# Patient Record
Sex: FEMALE | Race: WHITE | NOT HISPANIC OR LATINO | Employment: OTHER | URBAN - METROPOLITAN AREA
[De-identification: names, ages, dates, MRNs, and addresses within clinical notes are randomized per-mention and may not be internally consistent; named-entity substitution may affect disease eponyms.]

---

## 2017-04-02 ENCOUNTER — OFFICE VISIT (OUTPATIENT)
Dept: URGENT CARE | Facility: URGENT CARE | Age: 47
End: 2017-04-02
Payer: MEDICARE

## 2017-04-02 VITALS
TEMPERATURE: 99.2 F | WEIGHT: 221.25 LBS | OXYGEN SATURATION: 97 % | HEART RATE: 80 BPM | BODY MASS INDEX: 35.71 KG/M2 | SYSTOLIC BLOOD PRESSURE: 99 MMHG | DIASTOLIC BLOOD PRESSURE: 70 MMHG

## 2017-04-02 DIAGNOSIS — R05.9 COUGH: ICD-10-CM

## 2017-04-02 DIAGNOSIS — R07.0 THROAT PAIN: Primary | ICD-10-CM

## 2017-04-02 LAB
DEPRECATED S PYO AG THROAT QL EIA: NORMAL
MICRO REPORT STATUS: NORMAL
SPECIMEN SOURCE: NORMAL

## 2017-04-02 PROCEDURE — 87880 STREP A ASSAY W/OPTIC: CPT | Performed by: PHYSICIAN ASSISTANT

## 2017-04-02 PROCEDURE — 87081 CULTURE SCREEN ONLY: CPT | Performed by: PHYSICIAN ASSISTANT

## 2017-04-02 PROCEDURE — 99203 OFFICE O/P NEW LOW 30 MIN: CPT | Performed by: PHYSICIAN ASSISTANT

## 2017-04-02 RX ORDER — CODEINE PHOSPHATE AND GUAIFENESIN 10; 100 MG/5ML; MG/5ML
1 SOLUTION ORAL EVERY 4 HOURS PRN
Qty: 120 ML | Refills: 0 | Status: SHIPPED | OUTPATIENT
Start: 2017-04-02 | End: 2019-11-19

## 2017-04-02 NOTE — NURSING NOTE
"Chief Complaint   Patient presents with     Throat Pain     throat pain and cough for three days.       Initial BP 99/70  Pulse 80  Temp 99.2  F (37.3  C) (Oral)  Wt 221 lb 4 oz (100.4 kg)  SpO2 97%  BMI 35.71 kg/m2 Estimated body mass index is 35.71 kg/(m^2) as calculated from the following:    Height as of 2/28/14: 5' 6\" (1.676 m).    Weight as of this encounter: 221 lb 4 oz (100.4 kg).  Medication Reconciliation: complete    "

## 2017-04-02 NOTE — MR AVS SNAPSHOT
"              After Visit Summary   2017    Nora Zamorano    MRN: [id number]           Patient Information     Date Of Birth          1970        Visit Information        Provider Department      2017 9:30 AM Katy Alanis PA-C [place] [facility]        Today's Diagnoses     Throat pain    -  1       Follow-ups after your visit        Who to contact     If you have questions or need follow up information about today's clinic visit or your schedule please contact [place] [facility] directly at [phone].  Normal or non-critical lab and imaging results will be communicated to you by Songforhart, letter or phone within 4 business days after the clinic has received the results. If you do not hear from us within 7 days, please contact the clinic through Songforhart or phone. If you have a critical or abnormal lab result, we will notify you by phone as soon as possible.  Submit refill requests through Speek or call your pharmacy and they will forward the refill request to us. Please allow 3 business days for your refill to be completed.          Additional Information About Your Visit        MyChart Information     Speek lets you send messages to your doctor, view your test results, renew your prescriptions, schedule appointments and more. To sign up, go to [email or web address] . Click on \"Log in\" on the left side of the screen, which will take you to the Welcome page. Then click on \"Sign up Now\" on the right side of the page.     You will be asked to enter the access code listed below, as well as some personal information. Please follow the directions to create your username and password.     Your access code is: [email or web address]  Expires: 2017 10:19 AM     Your access code will  in 90 days. If you need help or a new code, please call your [place] clinic or [phone].        Care EveryWhere ID     This is your Care EveryWhere ID. This could " be used by other organizations to access your [place] medical records  [id number]        Your Vitals Were     Pulse Temperature Pulse Oximetry BMI (Body Mass Index)          80 99.2  F (37.3  C) (Oral) 97% 35.71 kg/m2         Blood Pressure from Last 3 Encounters:   04/02/17 99/70   02/28/14 109/81   02/04/13 122/80    Weight from Last 3 Encounters:   04/02/17 221 lb 4 oz (100.4 kg)   02/28/14 160 lb (72.6 kg)   02/04/13 244 lb (110.7 kg)              We Performed the Following     Beta strep group A culture     Rapid strep screen        Primary Care Provider Office Phone # Fax #    Jesse Day [phone]       [address] [address]        Thank you!     Thank you for choosing [place] [facility]  for your care. Our goal is always to provide you with excellent care. Hearing back from our patients is one way we can continue to improve our services. Please take a few minutes to complete the written survey that you may receive in the mail after your visit with us. Thank you!             Your Updated Medication List - Protect others around you: Learn how to safely use, store and throw away your medicines at www.disposemymeds.org.          This list is accurate as of: 4/2/17 10:19 AM.  Always use your most recent med list.                   Brand Name Dispense Instructions for use    calcium carbonate 500 MG tablet    OS- mg [place]. Ca    100 tablet    Take 2 tablets by mouth every 12 hours. 2 tablets twice daily x 2 weeks then 1 tablet twice daily until gone.       escitalopram 20 MG tablet    LEXAPRO    30 tablet    Take 1 tablet by mouth daily.       HYDROcodone-acetaminophen 7.5-325 MG per tablet    NORCO    18 tablet    Take 1 tablet by mouth every 3 hours as needed for pain.       levothyroxine 100 MCG tablet    SYNTHROID/LEVOTHROID     Take 350 mcg by mouth daily.       LOW-OGESTREL 0.3-30 MG-MCG per tablet   Generic  drug:  norgestrel-ethinyl estradiol      Take 1 tablet by mouth daily.       mupirocin 2 % ointment    BACTROBAN    22 g    Apply to skin lesion on nose bridge and nasal mucous membrane 2-3 times daily for 7-10 days       potassium chloride 20 MEQ Packet    KLOR-CON    60 tablet    Take 20 mEq by mouth 2 times daily       SEROQUEL 300 MG tablet   Generic drug:  QUEtiapine      Take 300 mg by mouth 2 times daily.       vilazodone 40 MG Tabs tablet    VIIBRYD    30 tablet    Take 1 tablet by mouth daily.

## 2017-04-02 NOTE — PROGRESS NOTES
SUBJECTIVE:   Nora Zamorano is a 47 year old female presenting with a chief complaint of   1) sore throat for 3 days  2) low grade fever  3) cough, dry.  Onset of symptoms was 3 day(s) ago.  Course of illness is worsening.    Severity moderate  Current and Associated symptoms: as above  Treatment measures tried include OTC meds.  Predisposing factors include none.  Did have a flu vaccination this season.    Past Medical History:   Diagnosis Date     Blood transfusion      Depressive disorder      Malignant tumor of thyroid gland (H)     10/10 2011 THYYROIDECTOMY     Patient Active Problem List   Diagnosis     Thyroid cancer--TSH target 0.05 to 0.4     Health Care Home (Not Active)     CARDIOVASCULAR SCREENING; LDL GOAL LESS THAN 130     History of MRSA infection     Obesity     Major depressive disorder, single episode, severe (H)     Social History   Substance Use Topics     Smoking status: Never Smoker     Smokeless tobacco: Never Used     Alcohol use No       ROS:  CONSTITUTIONAL:as per HPI  INTEGUMENTARY/SKIN: NEGATIVE for worrisome rashes, moles or lesions  EYES: NEGATIVE for vision changes or irritation  ENT/MOUTH: as per HPI  RESP:as per HPI  CV: NEGATIVE for chest pain, palpitations or peripheral edema  MUSCULOSKELETAL: NEGATIVE for significant arthralgias or myalgia    OBJECTIVE  :BP 99/70  Pulse 80  Temp 99.2  F (37.3  C) (Oral)  Wt 221 lb 4 oz (100.4 kg)  SpO2 97%  BMI 35.71 kg/m2  GENERAL APPEARANCE: healthy, alert and no distress  EYES: EOMI,  PERRL, conjunctiva clear  HENT: ear canals and TM's normal.  Nose and mouth without ulcers, erythema or lesions  HENT: nasal turbinates boggy with bluish hue and rhinorrhea clear  NECK: supple, nontender, no lymphadenopathy  RESP: lungs clear to auscultation - no rales, rhonchi or wheezes  CV: regular rates and rhythm, normal S1 S2, no murmur noted  ABDOMEN:  soft, nontender, no HSM or masses and bowel sounds normal  SKIN: no suspicious lesions or  rashes    (R07.0) Throat pain  (primary encounter diagnosis)  Comment: secondary to post nasal drip  Plan: Rapid strep screen, Beta strep group A culture        Salt water gargles.  Tylenol prn.   May try benadryl po QHS.  Cutlure pending.      (R05) Cough  Comment:   Plan: guaiFENesin-codeine (ROBITUSSIN AC) 100-10         MG/5ML SOLN solution            F/U with PCP should symptoms persist or worsen.  Patient expresses understanding and agreement with the assessment and plan as above.

## 2017-04-04 LAB
BACTERIA SPEC CULT: NORMAL
MICRO REPORT STATUS: NORMAL
SPECIMEN SOURCE: NORMAL

## 2018-10-20 ENCOUNTER — OFFICE VISIT (OUTPATIENT)
Dept: URGENT CARE | Facility: URGENT CARE | Age: 48
End: 2018-10-20
Payer: MEDICARE

## 2018-10-20 ENCOUNTER — RADIANT APPOINTMENT (OUTPATIENT)
Dept: GENERAL RADIOLOGY | Facility: CLINIC | Age: 48
End: 2018-10-20
Attending: FAMILY MEDICINE
Payer: MEDICARE

## 2018-10-20 VITALS
HEART RATE: 89 BPM | WEIGHT: 198.9 LBS | SYSTOLIC BLOOD PRESSURE: 109 MMHG | BODY MASS INDEX: 32.1 KG/M2 | TEMPERATURE: 98.5 F | OXYGEN SATURATION: 96 % | DIASTOLIC BLOOD PRESSURE: 72 MMHG | RESPIRATION RATE: 12 BRPM

## 2018-10-20 DIAGNOSIS — M20.012 MALLET FINGER, LEFT: Primary | ICD-10-CM

## 2018-10-20 DIAGNOSIS — M79.645 PAIN OF FINGER OF LEFT HAND: ICD-10-CM

## 2018-10-20 PROCEDURE — 99213 OFFICE O/P EST LOW 20 MIN: CPT | Performed by: FAMILY MEDICINE

## 2018-10-20 PROCEDURE — 73140 X-RAY EXAM OF FINGER(S): CPT | Mod: LT

## 2018-10-20 RX ORDER — TEMAZEPAM 15 MG/1
15 CAPSULE ORAL
COMMUNITY
Start: 2018-10-10

## 2018-10-20 RX ORDER — PROPRANOLOL HYDROCHLORIDE 20 MG/1
TABLET ORAL
COMMUNITY
Start: 2018-08-21 | End: 2022-01-28

## 2018-10-20 NOTE — MR AVS SNAPSHOT
After Visit Summary   10/20/2018    Nora Zamorano    MRN: [id number]           Patient Information     Date Of Birth          1970        Visit Information        Provider Department      10/20/2018 11:20 AM Timbo Mckeon MD [facility]        Today's Diagnoses     Mallet finger, left    -  1       Follow-ups after your visit        Additional Services     ORTHO  REFERRAL       [facility] is referring you to the Orthopedic  Services at [facility].       The  Representative will assist you in the coordination of your Orthopedic and Musculoskeletal Care as prescribed by your physician.    The  Representative will call you within 1 business day to help schedule your appointment, or you may contact the  Representative at:    All areas ~ [phone]     Type of Referral : Surgical / Specialist       Timeframe requested: 1 - 2 days    Coverage of these services is subject to the terms and limitations of your health insurance plan.  Please call member services at your health plan with any benefit or coverage questions.      If X-rays, CT or MRI's have been performed, please contact the facility where they were done to arrange for , prior to your scheduled appointment.  Please bring this referral request to your appointment and present it to your specialist.                  Who to contact     If you have questions or need follow up information about today's clinic visit or your schedule please contact [place] [facility] directly at [phone].  Normal or non-critical lab and imaging results will be communicated to you by MyChart, letter or phone within 4 business days after the clinic has received the results. If you do not hear from us within 7 days, please contact the clinic through MyChart or phone. If you have a critical or abnormal lab result, we  "will notify you by phone as soon as possible.  Submit refill requests through [employer] or call your pharmacy and they will forward the refill request to us. Please allow 3 business days for your refill to be completed.          Additional Information About Your Visit        Curriculethart Information     [employer] lets you send messages to your doctor, view your test results, renew your prescriptions, schedule appointments and more. To sign up, go to [email or web address] . Click on \"Log in\" on the left side of the screen, which will take you to the Welcome page. Then click on \"Sign up Now\" on the right side of the page.     You will be asked to enter the access code listed below, as well as some personal information. Please follow the directions to create your username and password.     Your access code is: [email or web address]  Expires: 2019 12:16 PM     Your access code will  in 90 days. If you need help or a new code, please call your [place] clinic or [phone].        Care EveryWhere ID     This is your Care EveryWhere ID. This could be used by other organizations to access your [place] medical records  [id number]        Your Vitals Were     Pulse Temperature Respirations Pulse Oximetry BMI (Body Mass Index)       89 98.5  F (36.9  C) (Oral) 12 96% 32.1 kg/m2        Blood Pressure from Last 3 Encounters:   10/20/18 109/72   17 99/70   14 109/81    Weight from Last 3 Encounters:   10/20/18 198 lb 14.4 oz (90.2 kg)   17 221 lb 4 oz (100.4 kg)   14 160 lb (72.6 kg)              We Performed the Following     ORTHO  REFERRAL        Primary Care Provider Office Phone # Fax #    Rozina Eugene [phone]       [facility] [address] [facility] [address]        Equal Access to Services     DIANELYS MICHAELS AH: Clif Darden, priyanka carlisle, brett loran ah. So [facility] " [phone].    ATENCIÓN: Si isamar mazariegos, tiene a richmond disposición servicios gratuitos de asistencia lingüística. Melinda bauer [phone].    We comply with applicable federal civil rights laws and [place] laws. We do not discriminate on the basis of race, color, national origin, age, disability, sex, sexual orientation, or gender identity.            Thank you!     Thank you for choosing [place] [facility]  for your care. Our goal is always to provide you with excellent care. Hearing back from our patients is one way we can continue to improve our services. Please take a few minutes to complete the written survey that you may receive in the mail after your visit with us. Thank you!             Your Updated Medication List - Protect others around you: Learn how to safely use, store and throw away your medicines at www.disposemymeds.org.          This list is accurate as of 10/20/18 12:16 PM.  Always use your most recent med list.                   Brand Name Dispense Instructions for use Diagnosis    calcium carbonate 500 mg (elemental) 500 MG tablet    OS-FLY    100 tablet    Take 2 tablets by mouth every 12 hours. 2 tablets twice daily x 2 weeks then 1 tablet twice daily until gone.    Thyroid cancer (H)       escitalopram 20 MG tablet    LEXAPRO    30 tablet    Take 1 tablet by mouth daily.    Depression       guaiFENesin-codeine 100-10 MG/5ML Soln solution    ROBITUSSIN AC    120 mL    Take 5 mLs by mouth every 4 hours as needed for cough    Cough       HYDROcodone-acetaminophen 7.5-325 MG per tablet    NORCO    18 tablet    Take 1 tablet by mouth every 3 hours as needed for pain.    Pain, dental       levothyroxine 100 MCG tablet    SYNTHROID/LEVOTHROID     Take 350 mcg by mouth daily.        LOW-OGESTREL 0.3-30 MG-MCG per tablet   Generic drug:  norgestrel-ethinyl estradiol      Take 1 tablet by mouth daily.        mupirocin 2 % ointment    BACTROBAN    22 g    Apply to skin lesion on  nose bridge and nasal mucous membrane 2-3 times daily for 7-10 days    Skin infection       potassium chloride 20 MEQ Packet    KLOR-CON    60 tablet    Take 20 mEq by mouth 2 times daily        propranolol 20 MG tablet    INDERAL     Take 1 Tab by mouth two times a day.        SEROQUEL 300 MG tablet   Generic drug:  QUEtiapine      Take 300 mg by mouth 2 times daily.        temazepam 15 MG capsule    RESTORIL     Take 30 mg by mouth        vilazodone 40 MG Tabs tablet    VIIBRYD    30 tablet    Take 1 tablet by mouth daily.    Depression

## 2018-10-20 NOTE — PROGRESS NOTES
SUBJECTIVE:  Chief Complaint   Patient presents with     Finger     Pt states left hand middle finger pain, and cold      Urgent Care     Nora Zamorano is a 48 year old female who presents with a chief complaint of left finger  third pain and decreased range of motion.  Symptoms began 2 day(s) ago, are moderate and sudden onset  Context:  Injury:Yes: while scrubbing the carpet.  Pain exacerbated by flexion/extension Relieved by aluminum splint.   R handed    Past Medical History:   Diagnosis Date     Blood transfusion      Depressive disorder      Malignant tumor of thyroid gland (H)     10/10 2011 THYYROIDECTOMY     Current Outpatient Prescriptions   Medication Sig Dispense Refill     escitalopram (LEXAPRO) 20 MG tablet Take 1 tablet by mouth daily. 30 tablet 0     levothyroxine (SYNTHROID,LEVOTHROID) 100 MCG tablet Take 350 mcg by mouth daily.       potassium chloride (KLOR-CON) 20 MEQ packet Take 20 mEq by mouth 2 times daily 60 tablet 0     propranolol (INDERAL) 20 MG tablet Take 1 Tab by mouth two times a day.       temazepam (RESTORIL) 15 MG capsule Take 30 mg by mouth       calcium carbonate 500 MG tablet Take 2 tablets by mouth every 12 hours. 2 tablets twice daily x 2 weeks then 1 tablet twice daily until gone. (Patient not taking: Reported on 10/20/2018) 100 tablet 0     guaiFENesin-codeine (ROBITUSSIN AC) 100-10 MG/5ML SOLN solution Take 5 mLs by mouth every 4 hours as needed for cough (Patient not taking: Reported on 10/20/2018) 120 mL 0     HYDROcodone-acetaminophen 7.5-325 MG per tablet Take 1 tablet by mouth every 3 hours as needed for pain. (Patient not taking: Reported on 10/20/2018) 18 tablet 0     mupirocin (BACTROBAN) 2 % ointment Apply to skin lesion on nose bridge and nasal mucous membrane 2-3 times daily for 7-10 days (Patient not taking: Reported on 10/20/2018) 22 g 1     norgestrel-ethinyl estradiol (LOW-OGESTREL) 0.3-30 MG-MCG per tablet Take 1 tablet by mouth daily.       QUEtiapine  (SEROQUEL) 300 MG tablet Take 300 mg by mouth 2 times daily.       vilazodone (VIIBRYD) 40 MG TABS Take 1 tablet by mouth daily. (Patient not taking: Reported on 10/20/2018) 30 tablet 0     Social History   Substance Use Topics     Smoking status: Never Smoker     Smokeless tobacco: Never Used     Alcohol use No       ROS:  CONSTITUTIONAL:NEGATIVE for fever, chills, change in weight  INTEGUMENTARY/SKIN: NEGATIVE for worrisome rashes, moles or lesions    EXAM:   /72  Pulse 89  Temp 98.5  F (36.9  C) (Oral)  Resp 12  Wt 198 lb 14.4 oz (90.2 kg)  SpO2 96%  BMI 32.1 kg/m2  M/S Exam:L finger third decreased ROM at DIP with loss of full extension.   Rest negative   GENERAL APPEARANCE: healthy, alert and no distress  EXTREMITIES: peripheral pulses normal  SKIN: no suspicious lesions or rashes  NEURO: Normal strength and tone, sensory exam grossly normal, mentation intact and speech normal    X-RAY was done.  I have personally reviewed the images and find nothing acute.      ASSESSMENT:  1. Mallet finger, left  Will splint in slight extension (Stax, size 3)  Ice, advil  - XR Finger Left G/E 2 Views; Future  - ORTHO  REFERRAL   PLAN:  See orders in epic

## 2018-10-20 NOTE — LETTER
[place] [facility]  [address] [address]  [phone]      October 20, 2018    RE:  Nora Zamorano                                                                                                                                                       [address] [place]  [place] [address]            To whom it may concern:    Nora Zamorano is under my professional care for Mallet finger, left.   She may not use her L hand for one week, until she sees orthopedics          Sincerely,        Timbo Mckeon MD    [place] [facility]

## 2018-10-22 ENCOUNTER — OFFICE VISIT (OUTPATIENT)
Dept: ORTHOPEDICS | Facility: CLINIC | Age: 48
End: 2018-10-22
Payer: MEDICARE

## 2018-10-22 VITALS — DIASTOLIC BLOOD PRESSURE: 76 MMHG | BODY MASS INDEX: 31.96 KG/M2 | WEIGHT: 198 LBS | SYSTOLIC BLOOD PRESSURE: 124 MMHG

## 2018-10-22 DIAGNOSIS — M20.031 SWAN-NECK DEFORMITY OF FINGER OF RIGHT HAND: Primary | ICD-10-CM

## 2018-10-22 PROCEDURE — 99203 OFFICE O/P NEW LOW 30 MIN: CPT | Performed by: ORTHOPAEDIC SURGERY

## 2018-10-22 NOTE — MR AVS SNAPSHOT
"              After Visit Summary   10/22/2018    Nora Zamorano    MRN: [id number]           Patient Information     Date Of Birth          1970        Visit Information        Provider Department      10/22/2018 3:20 PM Roberto Cleaning MD [facility] ORTHOPEDIC [facility]        Today's Diagnoses     [place]-neck deformity of finger of right hand    -  1       Follow-ups after your visit        Who to contact     If you have questions or need follow up information about today's clinic visit or your schedule please contact [facility] directly at [phone].  Normal or non-critical lab and imaging results will be communicated to you by Ignis IT Solutionshart, letter or phone within 4 business days after the clinic has received the results. If you do not hear from us within 7 days, please contact the clinic through [facility] or phone. If you have a critical or abnormal lab result, we will notify you by phone as soon as possible.  Submit refill requests through [employer] or call your pharmacy and they will forward the refill request to us. Please allow 3 business days for your refill to be completed.          Additional Information About Your Visit        MyChart Information     [employer] lets you send messages to your doctor, view your test results, renew your prescriptions, schedule appointments and more. To sign up, go to [email or web address] . Click on \"Log in\" on the left side of the screen, which will take you to the Welcome page. Then click on \"Sign up Now\" on the right side of the page.     You will be asked to enter the access code listed below, as well as some personal information. Please follow the directions to create your username and password.     Your access code is: [email or web address]  Expires: 2019 12:16 PM     Your access code will  in 90 days. If you need help or a new code, please call your [place] clinic or [phone].        Care EveryWhere ID     This is your Care " EveryWhere ID. This could be used by other organizations to access your [place] medical records  [id number]        Your Vitals Were     BMI (Body Mass Index)                   31.96 kg/m2            Blood Pressure from Last 3 Encounters:   10/22/18 124/76   10/20/18 109/72   04/02/17 99/70    Weight from Last 3 Encounters:   10/22/18 198 lb (89.8 kg)   10/20/18 198 lb 14.4 oz (90.2 kg)   04/02/17 221 lb 4 oz (100.4 kg)              Today, you had the following     No orders found for display         Today's Medication Changes          These changes are accurate as of 10/22/18  4:06 PM.  If you have any questions, ask your nurse or doctor.               Start taking these medicines.        Dose/Directions    order for DME   Used for:  [place]-neck deformity of finger of right hand   Started by:  Roberto Cleaning MD        Equipment being ordered: Oval -8 splint size 9   Quantity:  1 Device   Refills:  0            Where to get your medicines      Some of these will need a paper prescription and others can be bought over the counter.  Ask your nurse if you have questions.     Bring a paper prescription for each of these medications     order for DME                Primary Care Provider Office Phone # Fax #    Rozina Eugene [phone]       [facility] [place] [address]  [facility] [address]        Equal Access to Services     DIANELYS MICHAELS AH: Hadii mallorie cowano Sojermain, waaxda luqadaha, qaybta kaalmada tacho, brett umaña. So [facility] [phone].    ATENCIÓN: Si habla español, tiene a richmond disposición servicios gratuitos de asistencia lingüística. Llame al [phone].    We comply with applicable federal civil rights laws and [place] laws. We do not discriminate on the basis of race, color, national origin, age, disability, sex, sexual orientation, or gender identity.            Thank you!     Thank you for choosing [facility]  ORTHOPEDIC SURGERY  for your care. Our goal is always to provide you with excellent care. Hearing back from our patients is one way we can continue to improve our services. Please take a few minutes to complete the written survey that you may receive in the mail after your visit with us. Thank you!             Your Updated Medication List - Protect others around you: Learn how to safely use, store and throw away your medicines at www.disposemymeds.org.          This list is accurate as of 10/22/18  4:06 PM.  Always use your most recent med list.                   Brand Name Dispense Instructions for use Diagnosis    calcium carbonate 500 mg (elemental) 500 MG tablet    OS-FLY    100 tablet    Take 2 tablets by mouth every 12 hours. 2 tablets twice daily x 2 weeks then 1 tablet twice daily until gone.    Thyroid cancer (H)       escitalopram 20 MG tablet    LEXAPRO    30 tablet    Take 1 tablet by mouth daily.    Depression       guaiFENesin-codeine 100-10 MG/5ML Soln solution    ROBITUSSIN AC    120 mL    Take 5 mLs by mouth every 4 hours as needed for cough    Cough       HYDROcodone-acetaminophen 7.5-325 MG per tablet    NORCO    18 tablet    Take 1 tablet by mouth every 3 hours as needed for pain.    Pain, dental       levothyroxine 100 MCG tablet    SYNTHROID/LEVOTHROID     Take 350 mcg by mouth daily.        LOW-OGESTREL 0.3-30 MG-MCG per tablet   Generic drug:  norgestrel-ethinyl estradiol      Take 1 tablet by mouth daily.        mupirocin 2 % ointment    BACTROBAN    22 g    Apply to skin lesion on nose bridge and nasal mucous membrane 2-3 times daily for 7-10 days    Skin infection       order for DME     1 Device    Equipment being ordered: Oval -8 splint size 9    [place]-neck deformity of finger of right hand       potassium chloride 20 MEQ Packet    KLOR-CON    60 tablet    Take 20 mEq by mouth 2 times daily        propranolol 20 MG tablet    INDERAL     Take 1 Tab by mouth two times a day.        SEROQUEL  300 MG tablet   Generic drug:  QUEtiapine      Take 300 mg by mouth 2 times daily.        temazepam 15 MG capsule    RESTORIL     Take 30 mg by mouth        vilazodone 40 MG Tabs tablet    VIIBRYD    30 tablet    Take 1 tablet by mouth daily.    Depression

## 2018-10-22 NOTE — PROGRESS NOTES
HISTORY OF PRESENT ILLNESS:    Nora Zamorano is a 48 year old female who is seen in consultation at the request of Dr. Mckeon for left long finger pain and mallet deformity.  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  Patient is right hand dominant and works as .  Present symptoms: deformity of the finger, sensation of instability prior to splint use, patient denies swelling, numbness and tingling.   Treatments tried to this point: Staxx splint 3, Tylenol, Ice  Orthopedic PMH: left elbow dislocation with closed reduction.    Past Medical History:   Diagnosis Date     Blood transfusion      Depressive disorder      Malignant tumor of thyroid gland (H)     10/10 2011 THYYROIDECTOMY       Past Surgical History:   Procedure Laterality Date     APPENDECTOMY       CHOLECYSTECTOMY       DILATION AND CURETTAGE SUCTION       OPEN REDUCTION INTERNAL FIXATION ELBOW      left elbow     SOFT TISSUE SURGERY       THYROIDECTOMY      left lobe     THYROIDECTOMY  10/10/2011    Procedure:THYROIDECTOMY; Right Thyroid Lobectomy, Completion Thyroidectomy, Central Neck Dissection; Surgeon:VICKY HERRMANN; Location:[facility] OR       Family History   Problem Relation Age of Onset     Respiratory Father      Cancer Maternal Grandfather        Social History     Social History     Marital status:      Spouse name: N/A     Number of children: N/A     Years of education: N/A     Occupational History     Not on file.     Social History Main Topics     Smoking status: Never Smoker     Smokeless tobacco: Never Used     Alcohol use No     Drug use: No     Sexual activity: Not Currently     Other Topics Concern     Not on file     Social History Narrative       Current Outpatient Prescriptions   Medication Sig Dispense Refill     escitalopram (LEXAPRO) 20 MG tablet Take 1 tablet by mouth daily. 30 tablet 0     levothyroxine  (SYNTHROID,LEVOTHROID) 100 MCG tablet Take 350 mcg by mouth daily.       norgestrel-ethinyl estradiol (LOW-OGESTREL) 0.3-30 MG-MCG per tablet Take 1 tablet by mouth daily.       potassium chloride (KLOR-CON) 20 MEQ packet Take 20 mEq by mouth 2 times daily 60 tablet 0     propranolol (INDERAL) 20 MG tablet Take 1 Tab by mouth two times a day.       temazepam (RESTORIL) 15 MG capsule Take 30 mg by mouth       calcium carbonate 500 MG tablet Take 2 tablets by mouth every 12 hours. 2 tablets twice daily x 2 weeks then 1 tablet twice daily until gone. (Patient not taking: Reported on 10/22/2018) 100 tablet 0     guaiFENesin-codeine (ROBITUSSIN AC) 100-10 MG/5ML SOLN solution Take 5 mLs by mouth every 4 hours as needed for cough (Patient not taking: Reported on 10/22/2018) 120 mL 0     HYDROcodone-acetaminophen 7.5-325 MG per tablet Take 1 tablet by mouth every 3 hours as needed for pain. (Patient not taking: Reported on 10/22/2018) 18 tablet 0     mupirocin (BACTROBAN) 2 % ointment Apply to skin lesion on nose bridge and nasal mucous membrane 2-3 times daily for 7-10 days (Patient not taking: Reported on 10/22/2018) 22 g 1     QUEtiapine (SEROQUEL) 300 MG tablet Take 300 mg by mouth 2 times daily.       vilazodone (VIIBRYD) 40 MG TABS Take 1 tablet by mouth daily. (Patient not taking: Reported on 10/22/2018) 30 tablet 0       No Known Allergies    REVIEW OF SYSTEMS:  CONSTITUTIONAL:  NEGATIVE for fever, chills, change in weight  INTEGUMENTARY/SKIN:  NEGATIVE for worrisome rashes, moles or lesions  EYES:  NEGATIVE for vision changes or irritation  ENT/MOUTH:  NEGATIVE for ear, mouth and throat problems  RESP:  NEGATIVE for significant cough or SOB  BREAST:  NEGATIVE for masses, tenderness or discharge  CV:  NEGATIVE for chest pain, palpitations or peripheral edema  GI:  NEGATIVE for nausea, abdominal pain, heartburn, or change in bowel habits  :  Negative   MUSCULOSKELETAL:  See HPI above  NEURO:  NEGATIVE for  weakness, dizziness or paresthesias  ENDOCRINE:  NEGATIVE for temperature intolerance, skin/hair changes  HEME/ALLERGY/IMMUNE:  NEGATIVE for bleeding problems  PSYCHIATRIC:  NEGATIVE for changes in mood or affect      PHYSICAL EXAM:  /76 (BP Location: Right arm, Patient Position: Chair, Cuff Size: Adult Regular)  Wt 198 lb (89.8 kg)  BMI 31.96 kg/m2  Body mass index is 31.96 kg/(m^2).   GENERAL APPEARANCE: healthy, alert and no distress   SKIN: no suspicious lesions or rashes  NEURO: Normal strength and tone, mentation intact and speech normal  VASCULAR: Good pulses, and capillary refill   LYMPH: no lymphadenopathy   PSYCH:  mentation appears normal and affect normal/bright    MSK:  [place]-neck deformity, mild and flexible.     ASSESSMENT / PLAN: [place]-neck deformity.  She was placed in an oval 8 splint and she will wear this for the next 6 weeks.      Imaging Interpretation:     Recent Results (from the past 744 hour(s))   XR Finger Left G/E 2 Views    Narrative    XR FINGER LT G/E 2 VW 10/20/2018 11:49 AM     HISTORY: ; Pain of finger of left hand      Impression    IMPRESSION: Mild sw[place]-neck deformity. No apparent fracture.    MD Babatunde NUÑEZ MD  Department of Orthopedic Surgery

## 2018-10-22 NOTE — LETTER
10/22/2018         RE: Nora Zamorano  [address] [place]  [facility] [address]        Dear Colleague,    Thank you for referring your patient, Nora Zamorano, to the [facility] ORTHOPEDIC SURGERY. Please see a copy of my visit note below.    HISTORY OF PRESENT ILLNESS:    Nora Zamorano is a 48 year old female who is seen in consultation at the request of Dr. Mckeon for left long finger pain and mallet deformity.  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  Patient is right hand dominant and works as .  Present symptoms: deformity of the finger, sensation of instability prior to splint use, patient denies swelling, numbness and tingling.   Treatments tried to this point: Staxx splint 3, Tylenol, Ice  Orthopedic PMH: left elbow dislocation with closed reduction.    Past Medical History:   Diagnosis Date     Blood transfusion      Depressive disorder      Malignant tumor of thyroid gland (H)     10/10 2011 THYYROIDECTOMY       Past Surgical History:   Procedure Laterality Date     APPENDECTOMY       CHOLECYSTECTOMY       DILATION AND CURETTAGE SUCTION       OPEN REDUCTION INTERNAL FIXATION ELBOW      left elbow     SOFT TISSUE SURGERY       THYROIDECTOMY      left lobe     THYROIDECTOMY  10/10/2011    Procedure:THYROIDECTOMY; Right Thyroid Lobectomy, Completion Thyroidectomy, Central Neck Dissection; Surgeon:VICKY HERRMANN; Location:[facility] OR       Family History   Problem Relation Age of Onset     Respiratory Father      Cancer Maternal Grandfather        Social History     Social History     Marital status:      Spouse name: N/A     Number of children: N/A     Years of education: N/A     Occupational History     Not on file.     Social History Main Topics     Smoking status: Never Smoker     Smokeless tobacco: Never Used     Alcohol use No     Drug use: No     Sexual activity: Not  Currently     Other Topics Concern     Not on file     Social History Narrative       Current Outpatient Prescriptions   Medication Sig Dispense Refill     escitalopram (LEXAPRO) 20 MG tablet Take 1 tablet by mouth daily. 30 tablet 0     levothyroxine (SYNTHROID,LEVOTHROID) 100 MCG tablet Take 350 mcg by mouth daily.       norgestrel-ethinyl estradiol (LOW-OGESTREL) 0.3-30 MG-MCG per tablet Take 1 tablet by mouth daily.       potassium chloride (KLOR-CON) 20 MEQ packet Take 20 mEq by mouth 2 times daily 60 tablet 0     propranolol (INDERAL) 20 MG tablet Take 1 Tab by mouth two times a day.       temazepam (RESTORIL) 15 MG capsule Take 30 mg by mouth       calcium carbonate 500 MG tablet Take 2 tablets by mouth every 12 hours. 2 tablets twice daily x 2 weeks then 1 tablet twice daily until gone. (Patient not taking: Reported on 10/22/2018) 100 tablet 0     guaiFENesin-codeine (ROBITUSSIN AC) 100-10 MG/5ML SOLN solution Take 5 mLs by mouth every 4 hours as needed for cough (Patient not taking: Reported on 10/22/2018) 120 mL 0     HYDROcodone-acetaminophen 7.5-325 MG per tablet Take 1 tablet by mouth every 3 hours as needed for pain. (Patient not taking: Reported on 10/22/2018) 18 tablet 0     mupirocin (BACTROBAN) 2 % ointment Apply to skin lesion on nose bridge and nasal mucous membrane 2-3 times daily for 7-10 days (Patient not taking: Reported on 10/22/2018) 22 g 1     QUEtiapine (SEROQUEL) 300 MG tablet Take 300 mg by mouth 2 times daily.       vilazodone (VIIBRYD) 40 MG TABS Take 1 tablet by mouth daily. (Patient not taking: Reported on 10/22/2018) 30 tablet 0       No Known Allergies    REVIEW OF SYSTEMS:  CONSTITUTIONAL:  NEGATIVE for fever, chills, change in weight  INTEGUMENTARY/SKIN:  NEGATIVE for worrisome rashes, moles or lesions  EYES:  NEGATIVE for vision changes or irritation  ENT/MOUTH:  NEGATIVE for ear, mouth and throat problems  RESP:  NEGATIVE for significant cough or SOB  BREAST:  NEGATIVE for  masses, tenderness or discharge  CV:  NEGATIVE for chest pain, palpitations or peripheral edema  GI:  NEGATIVE for nausea, abdominal pain, heartburn, or change in bowel habits  :  Negative   MUSCULOSKELETAL:  See HPI above  NEURO:  NEGATIVE for weakness, dizziness or paresthesias  ENDOCRINE:  NEGATIVE for temperature intolerance, skin/hair changes  HEME/ALLERGY/IMMUNE:  NEGATIVE for bleeding problems  PSYCHIATRIC:  NEGATIVE for changes in mood or affect      PHYSICAL EXAM:  /76 (BP Location: Right arm, Patient Position: Chair, Cuff Size: Adult Regular)  Wt 198 lb (89.8 kg)  BMI 31.96 kg/m2  Body mass index is 31.96 kg/(m^2).   GENERAL APPEARANCE: healthy, alert and no distress   SKIN: no suspicious lesions or rashes  NEURO: Normal strength and tone, mentation intact and speech normal  VASCULAR: Good pulses, and capillary refill   LYMPH: no lymphadenopathy   PSYCH:  mentation appears normal and affect normal/bright    MSK:  [place]-neck deformity, mild and flexible.     ASSESSMENT / PLAN: [place]-neck deformity.  She was placed in an oval 8 splint and she will wear this for the next 6 weeks.      Imaging Interpretation:     Recent Results (from the past 744 hour(s))   XR Finger Left G/E 2 Views    Narrative    XR FINGER LT G/E 2 VW 10/20/2018 11:49 AM     HISTORY: ; Pain of finger of left hand      Impression    IMPRESSION: Mild swan-neck deformity. No apparent fracture.    MD Babatunde NUÑEZ MD  Department of Orthopedic Surgery          Again, thank you for allowing me to participate in the care of your patient.        Sincerely,        Roberto Cleaning MD

## 2019-08-17 ENCOUNTER — APPOINTMENT (OUTPATIENT)
Dept: ULTRASOUND IMAGING | Facility: CLINIC | Age: 49
End: 2019-08-17
Attending: EMERGENCY MEDICINE
Payer: MEDICARE

## 2019-08-17 ENCOUNTER — HOSPITAL ENCOUNTER (EMERGENCY)
Facility: CLINIC | Age: 49
Discharge: HOME | End: 2019-08-17
Attending: EMERGENCY MEDICINE | Admitting: EMERGENCY MEDICINE
Payer: MEDICARE

## 2019-08-17 VITALS
TEMPERATURE: 97.2 F | SYSTOLIC BLOOD PRESSURE: 153 MMHG | HEIGHT: 66 IN | WEIGHT: 217 LBS | OXYGEN SATURATION: 94 % | DIASTOLIC BLOOD PRESSURE: 82 MMHG | BODY MASS INDEX: 34.87 KG/M2 | RESPIRATION RATE: 16 BRPM

## 2019-08-17 DIAGNOSIS — R59.9 ENLARGED LYMPH NODES: ICD-10-CM

## 2019-08-17 PROCEDURE — 76536 US EXAM OF HEAD AND NECK: CPT

## 2019-08-17 PROCEDURE — 99284 EMERGENCY DEPT VISIT MOD MDM: CPT | Mod: 25

## 2019-08-17 ASSESSMENT — ENCOUNTER SYMPTOMS
NECK PAIN: 1
TROUBLE SWALLOWING: 0
FEVER: 0

## 2019-08-17 ASSESSMENT — MIFFLIN-ST. JEOR: SCORE: 1631.06

## 2019-08-17 NOTE — DISCHARGE INSTRUCTIONS
As we discussed today, your lymph nodes are slightly enlarged, but this is nonspecific.  The next step is to follow with your cancer team to see if a biopsy is indicated.  Please come back to the emergency room with any fever or other concerns.

## 2019-08-17 NOTE — ED PROVIDER NOTES
"  History     Chief Complaint:  Neck Pain    The history is provided by the patient.      Nora Zamorano is a 49 year old female, with a history of metastatic thyroid cancer and thyroidectomy, who presents with neck pain and neck lumps. Thirty minutes ago, patient found lumps on the right side of her neck where her thyroidectomy was performed. Patient was concerned and notes that her anxiety increased and she does not have an appointment for two months, prompting her to the ED. Here, patient denies fevers, chest pain, and trouble swallowing. Her endocrinologist is Dr. Lew Jimenez.    Allergies:  NKDA     Medications:    Lexapro   Calcium carbonate  Robitussin  Hydrocodone-acetaminophen   Levothyroxine  Norgestrel-ethinyl estradiol   Potassium chloride  Inderal   Seroquel   Restoril   Vilazodone     Past Medical History:   MRSA infection  Obesity  MDD  Thyroid cancer   Blood transfusion    Past Surgical History:    Appendectomy  Cholecystectomy  D and C   Open reduction internal fixation elbow  Soft tissue surgery  Thyroidectomy x2    Family History:    Respiratory - father     Social History:  Negative for tobacco use.  Negative for alcohol use.  Negative for drug use.  Marital Status:  .      Review of Systems   Constitutional: Negative for fever.   HENT: Negative for trouble swallowing.    Cardiovascular: Negative for chest pain.   Musculoskeletal: Positive for neck pain.   All other systems reviewed and are negative.      Physical Exam     Patient Vitals for the past 24 hrs:   BP Temp Temp src Heart Rate Resp SpO2 Height Weight   08/17/19 0506 (!) 153/82 97.2  F (36.2  C) Temporal 74 16 94 % 1.676 m (5' 6\") 98.4 kg (217 lb)     Physical Exam  Vitals: reviewed by me  General: Pt seen on [facility], pleasant, cooperative, and alert to conversation  Eyes: Tracking well, clear conjunctiva BL  ENT: MMM, midline trachea.  2 small lymph nodes felt on the right lateral aspect, posterior to the ear in " the posterior chain.  No skin change, no redness, nontender.  Lungs: No tachypnea, no accessory muscle use. No respiratory distress.   CV: Rate as above, regular rhythm.    MSK: no peripheral edema or joint effusion.  No evidence of trauma  Skin: No rash, normal turgor and temperature  Neuro: Clear speech and no facial droop.  Psych: Not RIS, no e/o AH/VH      Emergency Department Course   Imaging:  Radiographic findings were communicated with the patient who voiced understanding of the findings.    US Head Neck Soft Tissue    (Results Pending)     Emergency Department Course:  0510 Nursing notes and vitals reviewed. I performed an exam of the patient as documented above.     IV inserted. Medicine administered as documented above. Blood drawn. This was sent to the lab for further testing, results above.    The patient was sent for a neck soft tissue US while in the emergency department, findings above.       Findings and plan explained to the Patient. Patient discharged home with instructions regarding supportive care, medications, and reasons to return. The importance of close follow-up was reviewed.     I personally reviewed and answered all related questions prior to discharge.     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  She has no other symptoms, and first noticed these when she woke up this morning.  Ultrasound shows lymph nodes, of unclear etiology, of course this needs to be followed up with her cancer team.  Patient displays a high degree of knowledge regarding her own lymph nodes, and did initially seem frustrated when I asked if there was a type of imaging modality that she preferred given her CA history.  However at the close of the visit she states she is pleased with her care, and understands next steps.  No evidence of  lymphangitis, lymphadenitis, and no evidence of reactive lymphadenopathy from an infection, skin exam is otherwise benign.      Diagnosis:    ICD-10-CM    1. Enlarged lymph nodes R59.9     R neck       Disposition:  discharged to home    Discharge Medications:  New Prescriptions    No medications on file     Scribe Disposition  Melissa RIGGS, am serving as a scribe on 8/17/2019 at 5:14 AM to personally document services performed by Roberto Chavez MD based on my observations and the provider's statements to me.     Melissa Jones  8/17/2019    EMERGENCY DEPARTMENT       Roberto Chavez MD  08/18/19 0001

## 2019-08-17 NOTE — ED AVS SNAPSHOT
Emergency Department  [address] [place] [address]  Phone:  [phone]  Fax:  [phone]                                    Nora Zamorano   MRN: [id number]    Department:   Emergency Department   Date of Visit:  8/17/2019           After Visit Summary Signature Page    I have received my discharge instructions, and my questions have been answered. I have discussed any challenges I see with this plan with the nurse or doctor.    ..........................................................................................................................................  Patient/Patient Representative Signature      ..........................................................................................................................................  Patient Representative Print Name and Relationship to Patient    ..................................................               ................................................  Date                                   Time    ..........................................................................................................................................  Reviewed by Signature/Title    ...................................................              ..............................................  Date                                               Time          22EPIC Rev 08/18

## 2019-11-19 RX ORDER — LEVOTHYROXINE SODIUM 300 UG/1
300 TABLET ORAL
COMMUNITY
End: 2022-01-28 | Stop reason: DRUGHIGH

## 2019-11-19 RX ORDER — TRIAMCINOLONE ACETONIDE 1 MG/G
CREAM TOPICAL PRN
COMMUNITY
End: 2022-09-29

## 2019-11-19 RX ORDER — LEVOTHYROXINE SODIUM 150 UG/1
150 TABLET ORAL DAILY
COMMUNITY
End: 2022-01-28 | Stop reason: DRUGHIGH

## 2019-11-19 RX ORDER — QUETIAPINE FUMARATE 200 MG/1
200-400 TABLET, FILM COATED ORAL
COMMUNITY
End: 2022-09-29

## 2019-11-25 ASSESSMENT — MIFFLIN-ST. JEOR: SCORE: 1581.16

## 2019-11-26 ENCOUNTER — ANESTHESIA EVENT (OUTPATIENT)
Dept: SURGERY | Facility: CLINIC | Age: 49
End: 2019-11-26
Payer: MEDICARE

## 2019-11-26 ENCOUNTER — ANESTHESIA (OUTPATIENT)
Dept: SURGERY | Facility: CLINIC | Age: 49
End: 2019-11-26
Payer: MEDICARE

## 2019-11-26 ENCOUNTER — HOSPITAL ENCOUNTER (OUTPATIENT)
Facility: CLINIC | Age: 49
Discharge: HOME | End: 2019-11-26
Attending: OBSTETRICS & GYNECOLOGY | Admitting: OBSTETRICS & GYNECOLOGY
Payer: MEDICARE

## 2019-11-26 VITALS
OXYGEN SATURATION: 97 % | HEART RATE: 57 BPM | DIASTOLIC BLOOD PRESSURE: 89 MMHG | TEMPERATURE: 97.9 F | BODY MASS INDEX: 33.04 KG/M2 | RESPIRATION RATE: 16 BRPM | WEIGHT: 205.6 LBS | SYSTOLIC BLOOD PRESSURE: 149 MMHG | HEIGHT: 66 IN

## 2019-11-26 DIAGNOSIS — Z98.890 S/P D&C (STATUS POST DILATION AND CURETTAGE): Primary | ICD-10-CM

## 2019-11-26 PROBLEM — N93.9 ABNORMAL UTERINE BLEEDING: Status: ACTIVE | Noted: 2019-11-26

## 2019-11-26 LAB — HCG SERPL QL: NEGATIVE

## 2019-11-26 PROCEDURE — 25000132 ZZH RX MED GY IP 250 OP 250 PS 637: Mod: GY | Performed by: OBSTETRICS & GYNECOLOGY

## 2019-11-26 PROCEDURE — 25000125 ZZHC RX 250: Performed by: OBSTETRICS & GYNECOLOGY

## 2019-11-26 PROCEDURE — 71000013 ZZH RECOVERY PHASE 1 LEVEL 1 EA ADDTL HR: Performed by: OBSTETRICS & GYNECOLOGY

## 2019-11-26 PROCEDURE — 25000125 ZZHC RX 250: Performed by: ANESTHESIOLOGY

## 2019-11-26 PROCEDURE — 25000128 H RX IP 250 OP 636: Performed by: NURSE ANESTHETIST, CERTIFIED REGISTERED

## 2019-11-26 PROCEDURE — 40000306 ZZH STATISTIC PRE PROC ASSESS II: Performed by: OBSTETRICS & GYNECOLOGY

## 2019-11-26 PROCEDURE — 36415 COLL VENOUS BLD VENIPUNCTURE: CPT | Performed by: ANESTHESIOLOGY

## 2019-11-26 PROCEDURE — 71000012 ZZH RECOVERY PHASE 1 LEVEL 1 FIRST HR: Performed by: OBSTETRICS & GYNECOLOGY

## 2019-11-26 PROCEDURE — 36000056 ZZH SURGERY LEVEL 3 1ST 30 MIN: Performed by: OBSTETRICS & GYNECOLOGY

## 2019-11-26 PROCEDURE — 27210794 ZZH OR GENERAL SUPPLY STERILE: Performed by: OBSTETRICS & GYNECOLOGY

## 2019-11-26 PROCEDURE — 37000008 ZZH ANESTHESIA TECHNICAL FEE, 1ST 30 MIN: Performed by: OBSTETRICS & GYNECOLOGY

## 2019-11-26 PROCEDURE — 25800025 ZZH RX 258: Performed by: OBSTETRICS & GYNECOLOGY

## 2019-11-26 PROCEDURE — 88305 TISSUE EXAM BY PATHOLOGIST: CPT | Performed by: OBSTETRICS & GYNECOLOGY

## 2019-11-26 PROCEDURE — 84703 CHORIONIC GONADOTROPIN ASSAY: CPT | Performed by: ANESTHESIOLOGY

## 2019-11-26 PROCEDURE — 25000125 ZZHC RX 250: Performed by: NURSE ANESTHETIST, CERTIFIED REGISTERED

## 2019-11-26 PROCEDURE — 25800030 ZZH RX IP 258 OP 636: Performed by: ANESTHESIOLOGY

## 2019-11-26 PROCEDURE — 88305 TISSUE EXAM BY PATHOLOGIST: CPT | Mod: 26 | Performed by: OBSTETRICS & GYNECOLOGY

## 2019-11-26 PROCEDURE — 25000128 H RX IP 250 OP 636: Performed by: ANESTHESIOLOGY

## 2019-11-26 PROCEDURE — 71000027 ZZH RECOVERY PHASE 2 EACH 15 MINS: Performed by: OBSTETRICS & GYNECOLOGY

## 2019-11-26 PROCEDURE — 37000009 ZZH ANESTHESIA TECHNICAL FEE, EACH ADDTL 15 MIN: Performed by: OBSTETRICS & GYNECOLOGY

## 2019-11-26 PROCEDURE — 36000058 ZZH SURGERY LEVEL 3 EA 15 ADDTL MIN: Performed by: OBSTETRICS & GYNECOLOGY

## 2019-11-26 DEVICE — IUD CONTRACEPTIVE DEVICE MIRENA 50419-4230-01: Type: IMPLANTABLE DEVICE | Site: UTERUS | Status: FUNCTIONAL

## 2019-11-26 RX ORDER — OXYCODONE HYDROCHLORIDE 5 MG/1
5 TABLET ORAL EVERY 4 HOURS PRN
Status: DISCONTINUED | OUTPATIENT
Start: 2019-11-26 | End: 2019-11-26 | Stop reason: HOSPADM

## 2019-11-26 RX ORDER — FENTANYL CITRATE 50 UG/ML
25-50 INJECTION, SOLUTION INTRAMUSCULAR; INTRAVENOUS EVERY 5 MIN PRN
Status: DISCONTINUED | OUTPATIENT
Start: 2019-11-26 | End: 2019-11-26 | Stop reason: HOSPADM

## 2019-11-26 RX ORDER — LIDOCAINE 40 MG/G
CREAM TOPICAL
Status: DISCONTINUED | OUTPATIENT
Start: 2019-11-26 | End: 2019-11-26 | Stop reason: HOSPADM

## 2019-11-26 RX ORDER — ALBUTEROL SULFATE 0.83 MG/ML
2.5 SOLUTION RESPIRATORY (INHALATION) EVERY 4 HOURS PRN
Status: DISCONTINUED | OUTPATIENT
Start: 2019-11-26 | End: 2019-11-26 | Stop reason: HOSPADM

## 2019-11-26 RX ORDER — METOPROLOL TARTRATE 1 MG/ML
1-2 INJECTION, SOLUTION INTRAVENOUS EVERY 5 MIN PRN
Status: DISCONTINUED | OUTPATIENT
Start: 2019-11-26 | End: 2019-11-26 | Stop reason: HOSPADM

## 2019-11-26 RX ORDER — ONDANSETRON 2 MG/ML
INJECTION INTRAMUSCULAR; INTRAVENOUS PRN
Status: DISCONTINUED | OUTPATIENT
Start: 2019-11-26 | End: 2019-11-26

## 2019-11-26 RX ORDER — FENTANYL CITRATE 50 UG/ML
INJECTION, SOLUTION INTRAMUSCULAR; INTRAVENOUS PRN
Status: DISCONTINUED | OUTPATIENT
Start: 2019-11-26 | End: 2019-11-26

## 2019-11-26 RX ORDER — HYDRALAZINE HYDROCHLORIDE 20 MG/ML
2.5-5 INJECTION INTRAMUSCULAR; INTRAVENOUS EVERY 10 MIN PRN
Status: DISCONTINUED | OUTPATIENT
Start: 2019-11-26 | End: 2019-11-26 | Stop reason: HOSPADM

## 2019-11-26 RX ORDER — MEPERIDINE HYDROCHLORIDE 50 MG/ML
12.5 INJECTION INTRAMUSCULAR; INTRAVENOUS; SUBCUTANEOUS
Status: DISCONTINUED | OUTPATIENT
Start: 2019-11-26 | End: 2019-11-26 | Stop reason: HOSPADM

## 2019-11-26 RX ORDER — ONDANSETRON 4 MG/1
4 TABLET, ORALLY DISINTEGRATING ORAL
Status: DISCONTINUED | OUTPATIENT
Start: 2019-11-26 | End: 2019-11-26 | Stop reason: HOSPADM

## 2019-11-26 RX ORDER — PROPOFOL 10 MG/ML
INJECTION, EMULSION INTRAVENOUS CONTINUOUS PRN
Status: DISCONTINUED | OUTPATIENT
Start: 2019-11-26 | End: 2019-11-26

## 2019-11-26 RX ORDER — PROPOFOL 10 MG/ML
INJECTION, EMULSION INTRAVENOUS PRN
Status: DISCONTINUED | OUTPATIENT
Start: 2019-11-26 | End: 2019-11-26

## 2019-11-26 RX ORDER — SODIUM CHLORIDE, SODIUM LACTATE, POTASSIUM CHLORIDE, CALCIUM CHLORIDE 600; 310; 30; 20 MG/100ML; MG/100ML; MG/100ML; MG/100ML
INJECTION, SOLUTION INTRAVENOUS CONTINUOUS
Status: DISCONTINUED | OUTPATIENT
Start: 2019-11-26 | End: 2019-11-26 | Stop reason: HOSPADM

## 2019-11-26 RX ORDER — HYDROMORPHONE HYDROCHLORIDE 1 MG/ML
.3-.5 INJECTION, SOLUTION INTRAMUSCULAR; INTRAVENOUS; SUBCUTANEOUS EVERY 10 MIN PRN
Status: DISCONTINUED | OUTPATIENT
Start: 2019-11-26 | End: 2019-11-26 | Stop reason: HOSPADM

## 2019-11-26 RX ORDER — OXYCODONE HYDROCHLORIDE 5 MG/1
5 TABLET ORAL
Status: COMPLETED | OUTPATIENT
Start: 2019-11-26 | End: 2019-11-26

## 2019-11-26 RX ORDER — ACETAMINOPHEN 325 MG/1
650 TABLET ORAL
Status: DISCONTINUED | OUTPATIENT
Start: 2019-11-26 | End: 2019-11-26 | Stop reason: HOSPADM

## 2019-11-26 RX ORDER — NALOXONE HYDROCHLORIDE 0.4 MG/ML
.1-.4 INJECTION, SOLUTION INTRAMUSCULAR; INTRAVENOUS; SUBCUTANEOUS
Status: DISCONTINUED | OUTPATIENT
Start: 2019-11-26 | End: 2019-11-26 | Stop reason: HOSPADM

## 2019-11-26 RX ORDER — ONDANSETRON 4 MG/1
4-8 TABLET, ORALLY DISINTEGRATING ORAL EVERY 8 HOURS PRN
Qty: 4 TABLET | Refills: 0 | Status: SHIPPED | OUTPATIENT
Start: 2019-11-26 | End: 2020-10-01

## 2019-11-26 RX ORDER — METHYLERGONOVINE MALEATE 0.2 MG/ML
INJECTION INTRAVENOUS PRN
Status: DISCONTINUED | OUTPATIENT
Start: 2019-11-26 | End: 2019-11-26

## 2019-11-26 RX ORDER — DEXAMETHASONE SODIUM PHOSPHATE 4 MG/ML
INJECTION, SOLUTION INTRA-ARTICULAR; INTRALESIONAL; INTRAMUSCULAR; INTRAVENOUS; SOFT TISSUE PRN
Status: DISCONTINUED | OUTPATIENT
Start: 2019-11-26 | End: 2019-11-26

## 2019-11-26 RX ORDER — FENTANYL CITRATE 50 UG/ML
25-50 INJECTION, SOLUTION INTRAMUSCULAR; INTRAVENOUS
Status: DISCONTINUED | OUTPATIENT
Start: 2019-11-26 | End: 2019-11-26 | Stop reason: HOSPADM

## 2019-11-26 RX ORDER — ONDANSETRON 4 MG/1
4 TABLET, ORALLY DISINTEGRATING ORAL EVERY 30 MIN PRN
Status: DISCONTINUED | OUTPATIENT
Start: 2019-11-26 | End: 2019-11-26 | Stop reason: HOSPADM

## 2019-11-26 RX ORDER — ONDANSETRON 2 MG/ML
4 INJECTION INTRAMUSCULAR; INTRAVENOUS EVERY 30 MIN PRN
Status: DISCONTINUED | OUTPATIENT
Start: 2019-11-26 | End: 2019-11-26 | Stop reason: HOSPADM

## 2019-11-26 RX ADMIN — FENTANYL CITRATE 50 MCG: 50 INJECTION, SOLUTION INTRAMUSCULAR; INTRAVENOUS at 09:11

## 2019-11-26 RX ADMIN — PROPOFOL 200 MG: 10 INJECTION, EMULSION INTRAVENOUS at 07:57

## 2019-11-26 RX ADMIN — PROPOFOL 75 MCG/KG/MIN: 10 INJECTION, EMULSION INTRAVENOUS at 08:02

## 2019-11-26 RX ADMIN — METHYLERGONOVINE MALEATE 200 MCG: 0.2 INJECTION INTRAVENOUS at 08:35

## 2019-11-26 RX ADMIN — DEXAMETHASONE SODIUM PHOSPHATE 4 MG: 4 INJECTION, SOLUTION INTRA-ARTICULAR; INTRALESIONAL; INTRAMUSCULAR; INTRAVENOUS; SOFT TISSUE at 07:57

## 2019-11-26 RX ADMIN — LIDOCAINE HYDROCHLORIDE 50 MG: 10 INJECTION, SOLUTION EPIDURAL; INFILTRATION; INTRACAUDAL; PERINEURAL at 07:57

## 2019-11-26 RX ADMIN — OXYCODONE HYDROCHLORIDE 5 MG: 5 TABLET ORAL at 09:46

## 2019-11-26 RX ADMIN — SODIUM CHLORIDE, POTASSIUM CHLORIDE, SODIUM LACTATE AND CALCIUM CHLORIDE: 600; 310; 30; 20 INJECTION, SOLUTION INTRAVENOUS at 06:45

## 2019-11-26 RX ADMIN — MIDAZOLAM 2 MG: 1 INJECTION INTRAMUSCULAR; INTRAVENOUS at 07:51

## 2019-11-26 RX ADMIN — FENTANYL CITRATE 100 MCG: 50 INJECTION, SOLUTION INTRAMUSCULAR; INTRAVENOUS at 07:57

## 2019-11-26 RX ADMIN — ONDANSETRON HYDROCHLORIDE 4 MG: 2 INJECTION, SOLUTION INTRAVENOUS at 08:26

## 2019-11-26 ASSESSMENT — MIFFLIN-ST. JEOR: SCORE: 1579.35

## 2019-11-26 NOTE — ANESTHESIA CARE TRANSFER NOTE
Patient: Nora Zamorano    Procedure(s):  HYSTEROSCOPY, WITH DILATION AND CURETTAGE OF UTERUS  INSERTION, INTRAUTERINE DEVICE    Diagnosis: Abnormal uterine bleeding [N93.9]  Diagnosis Additional Information: No value filed.    Anesthesia Type:   General, LMA     Note:  Airway :Face Mask  Patient transferred to:PACU  Comments: Patient with spontaneous respirations and adequate tidal volumes. Patient awake and responsive. LMA removed in OR to 8 L face tent. To PACU ventilating well. VSS. Report given.Handoff Report: Identifed the Patient, Identified the Reponsible Provider, Reviewed the pertinent medical history, Discussed the surgical course, Reviewed Intra-OP anesthesia mangement and issues during anesthesia, Set expectations for post-procedure period and Allowed opportunity for questions and acknowledgement of understanding      Vitals: (Last set prior to Anesthesia Care Transfer)    CRNA VITALS  11/26/2019 0813 - 11/26/2019 0849      11/26/2019             NIBP:  (!) 148/97    Pulse:  63    NIBP Mean:  111    SpO2:  99 %                Electronically Signed By: DEBRA Rolle CRNA  November 26, 2019  8:49 AM

## 2019-11-26 NOTE — ANESTHESIA POSTPROCEDURE EVALUATION
Patient: Nora Zamorano    Procedure(s):  HYSTEROSCOPY, WITH DILATION AND CURETTAGE OF UTERUS  INSERTION, INTRAUTERINE DEVICE    Diagnosis:Abnormal uterine bleeding [N93.9]  Diagnosis Additional Information: No value filed.    Anesthesia Type:  General, LMA    Note:  Anesthesia Post Evaluation    Patient location during evaluation: PACU  Patient participation: Able to fully participate in evaluation  Level of consciousness: awake  Pain management: adequate  Airway patency: patent  Cardiovascular status: acceptable  Respiratory status: acceptable  Hydration status: acceptable  PONV: controlled     Anesthetic complications: None          Last vitals:  Vitals:    11/26/19 0900 11/26/19 0905 11/26/19 0910   BP: (!) 148/98 (!) 151/108 (!) 142/90   Pulse: 55 58 59   Resp: (!) 7 13 17   Temp:      SpO2: 100% 99% 99%         Electronically Signed By: Romeo Garcia MD  November 26, 2019  9:14 AM

## 2019-11-26 NOTE — OR NURSING
I took over this patient from [facility], this patient was very angry with her Dr., she said the Dr wouldn't listen to her because she was behind schedule, the Dr prescribed nsaids and the patient can't take nsaids, I offered to get the patient advocate for her but I was told she can advocate for herself, this patient was very hostile to me and wanted to go home right now, discharge instructions were given to the patient and her mom, a page had been sent out to the Dr to get a prescription but the Dr hasn't returned any call yet.

## 2019-11-26 NOTE — ANESTHESIA PREPROCEDURE EVALUATION
Anesthesia Pre-Procedure Evaluation    Patient: Nora Zamorano   MRN: [id number] : 1970          Preoperative Diagnosis: Abnormal uterine bleeding [N93.9]    Procedure(s):  HYSTEROSCOPY, WITH DILATION AND CURETTAGE OF UTERUS  INSERTION, INTRAUTERINE DEVICE    Past Medical History:   Diagnosis Date     Anxiety      Blood transfusion      Depressive disorder      History of blood transfusion      Malignant tumor of thyroid gland (H)     10/10 2011 THYYROIDECTOMY     PTSD (post-traumatic stress disorder)      Past Surgical History:   Procedure Laterality Date     APPENDECTOMY       CHOLECYSTECTOMY       DILATION AND CURETTAGE SUCTION       DISSECTION RADICAL NECK Right 2015     OPEN REDUCTION INTERNAL FIXATION ELBOW      left elbow     SOFT TISSUE SURGERY       THYROIDECTOMY      left lobe     THYROIDECTOMY  10/10/2011    Procedure:THYROIDECTOMY; Right Thyroid Lobectomy, Completion Thyroidectomy, Central Neck Dissection; Surgeon:VICKY HERRMANN; Location:[facility] OR     Anesthesia Evaluation     . Pt has had prior anesthetic.            ROS/MED HX    ENT/Pulmonary:      (-) sleep apnea   Neurologic:       Cardiovascular:  - neg cardiovascular ROS       METS/Exercise Tolerance:     Hematologic:         Musculoskeletal:         GI/Hepatic:        (-) GERD   Renal/Genitourinary:         Endo:     (+) thyroid problem (papillary carcinoma of thyroid s/p resection') hypothyroidism, Obesity, .      Psychiatric:     (+) psychiatric history depression and anxiety      Infectious Disease:         Malignancy:         Other:                          Physical Exam      Airway   Mallampati: II  TM distance: >3 FB  Neck ROM: full    Dental     Cardiovascular   Rhythm and rate: regular and normal      Pulmonary    breath sounds clear to auscultation            Lab Results   Component Value Date    WBC 7.6 2014    HGB 13.8 2014    HCT 38.4 2014     2014    SED 14 10/25/2011     2014  "   POTASSIUM 2.9 (L) 02/28/2014    CHLORIDE 108 02/28/2014    CO2 16 (L) 02/28/2014    BUN 21 02/28/2014    CR 0.84 02/28/2014     (H) 02/28/2014    FLY 9.2 02/28/2014    ALBUMIN 4.1 02/16/2012    PROTTOTAL 7.7 02/16/2012    ALT 26 02/16/2012    AST 26 02/16/2012    ALKPHOS 80 02/16/2012    BILITOTAL 0.5 02/16/2012    BILIDIRECT 0.00 10/18/2011    TSH <0.02 (L) 02/28/2014    T4 1.71 02/28/2014    T3 102 04/20/2012    HCGS Negative 04/09/2012       Preop Vitals  BP Readings from Last 3 Encounters:   11/26/19 (!) 135/90   08/17/19 (!) 153/82   10/22/18 124/76    Pulse Readings from Last 3 Encounters:   10/20/18 89   04/02/17 80   02/28/14 83      Resp Readings from Last 3 Encounters:   11/26/19 16   08/17/19 16   10/20/18 12    SpO2 Readings from Last 3 Encounters:   11/26/19 96%   08/17/19 94%   10/20/18 96%      Temp Readings from Last 1 Encounters:   11/26/19 97.1  F (36.2  C) (Temporal)    Ht Readings from Last 1 Encounters:   11/26/19 1.676 m (5' 6\")      Wt Readings from Last 1 Encounters:   11/26/19 93.3 kg (205 lb 9.6 oz)    Estimated body mass index is 33.18 kg/m  as calculated from the following:    Height as of this encounter: 1.676 m (5' 6\").    Weight as of this encounter: 93.3 kg (205 lb 9.6 oz).       Anesthesia Plan      History & Physical Review  History and physical reviewed and following examination; no interval change.    ASA Status:  3 .    NPO Status:  > 8 hours    Plan for General and LMA with Intravenous and Propofol induction. Maintenance will be Balanced.    PONV prophylaxis:  Ondansetron (or other 5HT-3) and Dexamethasone or Solumedrol       Postoperative Care  Postoperative pain management:  IV analgesics, Oral pain medications and Multi-modal analgesia.      Consents  Anesthetic plan, risks, benefits and alternatives discussed with:  Patient..                 Romeo Garcia MD                    .  "
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
## 2019-11-26 NOTE — DISCHARGE INSTRUCTIONS
GENERAL ANESTHESIA OR SEDATION ADULT DISCHARGE INSTRUCTIONS   SPECIAL PRECAUTIONS FOR 24 HOURS AFTER SURGERY    IT IS NOT UNUSUAL TO FEEL LIGHT-HEADED OR FAINT, UP TO 24 HOURS AFTER SURGERY OR WHILE TAKING PAIN MEDICATION.  IF YOU HAVE THESE SYMPTOMS; SIT FOR A FEW MINUTES BEFORE STANDING AND HAVE SOMEONE ASSIST YOU WHEN YOU GET UP TO WALK OR USE THE BATHROOM.    YOU SHOULD REST AND RELAX FOR THE NEXT 24 HOURS AND YOU MUST MAKE ARRANGEMENTS TO HAVE SOMEONE STAY WITH YOU FOR AT LEAST 24 HOURS AFTER YOUR DISCHARGE.  AVOID HAZARDOUS AND STRENUOUS ACTIVITIES.  DO NOT MAKE IMPORTANT DECISIONS FOR 24 HOURS.    DO NOT DRIVE ANY VEHICLE OR OPERATE MECHANICAL EQUIPMENT FOR 24 HOURS FOLLOWING THE END OF YOUR SURGERY.  EVEN THOUGH YOU MAY FEEL NORMAL, YOUR REACTIONS MAY BE AFFECTED BY THE MEDICATION YOU HAVE RECEIVED.    DO NOT DRINK ALCOHOLIC BEVERAGES FOR 24 HOURS FOLLOWING YOUR SURGERY.    DRINK CLEAR LIQUIDS (APPLE JUICE, GINGER ALE, 7-UP, BROTH, ETC.).  PROGRESS TO YOUR REGULAR DIET AS YOU FEEL ABLE.    YOU MAY HAVE A DRY MOUTH, A SORE THROAT, MUSCLES ACHES OR TROUBLE SLEEPING.  THESE SHOULD GO AWAY AFTER 24 HOURS.    CALL YOUR DOCTOR FOR ANY OF THE FOLLOWING:  SIGNS OF INFECTION (FEVER, GROWING TENDERNESS AT THE SURGERY SITE, A LARGE AMOUNT OF DRAINAGE OR BLEEDING, SEVERE PAIN, FOUL-SMELLING DRAINAGE, REDNESS OR SWELLING.    IT HAS BEEN OVER 8 TO 10 HOURS SINCE SURGERY AND YOU ARE STILL NOT ABLE TO URINATE (PASS WATER).     LAPAROSCOPY, HYSTEROSCOPY OR PELVISCOPY DISCHARGE INSTRUCTIONS    PLEASE RETURN TO THE CLINIC IN:  ____1 WEEK  ____2 WEEKS  ____4 WEEKS  ____6 WEEKS  MAKE THIS APPOINTMENT AFTER YOU GET HOME IF IT HAS NOT ALREADY BEEN SCHEDULED.    DO NOT DRIVE A CAR, DRINK ALCOHOL OR USE MACHINERY FOR THE NEXT 24 HOURS.  YOU SHOULD WAIT UNTIL YOU HAVE RECOVERED BEFORE MAKING ANY IMPORTANT DECISIONS.    PAIN  YOU MAY HAVE CRAMPS, SHOULDER PAIN OR A LOW BACKACHE FOR 24 TO 48 HOURS.  TYLENOL (ACETAMINOPHEN) OR MOTRIN  (IBUPROFEN) MAY HELP, OR YOUR DOCTOR MAY GIVE YOU PAIN MEDICINE.  CALL YOUR DOCTOR IF PAIN CANNOT BE CONTROLLED.    BLEEDING OR VAGINAL DISCHARGE  YOU MAY HAVE SOME BLEEDING OR DISCHARGE FOR UP TO A WEEK OR LONGER.  DO NOT DOUCHE, USE TAMPONS OR HAVE SEX (INTERCOURSE) FOR ______DAYS.  CALL YOUR DOCTOR IF YOU SOAK MORE THAN ONE MAXI PAD (SANITARY [place]) PER HOUR, OR IF YOU PASS LARGE BLOOD CLOTS.    FEVER  YOU MAY HAVE A LOW FEVER FOR THE FIRST TWO DAYS.  CALL YOUR DOCTOR IF IT GOES OVER 101 DEGREES.    NAUSEA  IF YOU HAVE NAUSEA (FEEL SICK TO YOUR STOMACH), STAY IN BED.  TRY DRINKING A SMALL AMOUNT OF 7-UP, TEA OR SOUP.    SWOLLEN BELLY  IF YOUR ABDOMEN (BELLY AREA) FEELS FIRM OR SWOLLEN, CALL YOUR DOCTOR.    DIZZINESS AND WEAKNESS  YOU MAY FEEL DIZZY OR WEAK FOR A FEW DAYS.  IF SO, YOU SHOULD REST OFTEN, STAND UP SLOWLY AND USE CARE WHEN CLIMBING STAIRS.    DIET AND ACTIVITY  EAT LIGHT MEALS AND DRINK PLENTY OF FLUIDS FOR THE FIRST 24 HOURS (OR LONGER, IF YOU HAVE NAUSEA).  WAIT 5 DAYS BEFORE BATHING.  SHOWERS ARE OKAY.  MOST WOMEN CAN RETURN TO WORK AFTER 24 HOURS.  YOU MAY GO BACK TO YOUR OTHER ACTIVITIES AFTER YOUR PAIN GOES AWAY.      IF YOU HAVE STITCHES  YOU MAY REMOVE YOUR BANDAGE THE DAY AFTER TREATMENT.  YOUR DOCTOR WILL TELL YOU IF YOUR STITCHES NEED TO BE REMOVED.  SOME STITCHES DISSOLVE OVER TIME.          You recieved 1 tablet of oxycodone pain medication today at 9:45am

## 2019-11-27 LAB — COPATH REPORT: NORMAL

## 2019-12-07 ENCOUNTER — HOSPITAL ENCOUNTER (EMERGENCY)
Facility: CLINIC | Age: 49
Discharge: HOME | End: 2019-12-07
Attending: NURSE PRACTITIONER | Admitting: NURSE PRACTITIONER
Payer: COMMERCIAL

## 2019-12-07 ENCOUNTER — APPOINTMENT (OUTPATIENT)
Dept: GENERAL RADIOLOGY | Facility: CLINIC | Age: 49
End: 2019-12-07
Attending: NURSE PRACTITIONER
Payer: COMMERCIAL

## 2019-12-07 VITALS
TEMPERATURE: 98.4 F | RESPIRATION RATE: 18 BRPM | HEART RATE: 86 BPM | BODY MASS INDEX: 31.82 KG/M2 | DIASTOLIC BLOOD PRESSURE: 85 MMHG | HEIGHT: 66 IN | SYSTOLIC BLOOD PRESSURE: 141 MMHG | OXYGEN SATURATION: 98 % | WEIGHT: 198 LBS

## 2019-12-07 DIAGNOSIS — N93.9 VAGINAL BLEEDING: ICD-10-CM

## 2019-12-07 DIAGNOSIS — M62.838 MUSCLE SPASM: ICD-10-CM

## 2019-12-07 DIAGNOSIS — S16.1XXA STRAIN OF NECK MUSCLE, INITIAL ENCOUNTER: ICD-10-CM
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
## 2019-12-07 PROCEDURE — 25000132 ZZH RX MED GY IP 250 OP 250 PS 637: Performed by: NURSE PRACTITIONER

## 2019-12-07 PROCEDURE — 72040 X-RAY EXAM NECK SPINE 2-3 VW: CPT

## 2019-12-07 PROCEDURE — 99284 EMERGENCY DEPT VISIT MOD MDM: CPT

## 2019-12-07 RX ORDER — HYDROCODONE BITARTRATE AND ACETAMINOPHEN 5; 325 MG/1; MG/1
2 TABLET ORAL ONCE
Status: COMPLETED | OUTPATIENT
Start: 2019-12-07 | End: 2019-12-07

## 2019-12-07 RX ORDER — CYCLOBENZAPRINE HCL 10 MG
10 TABLET ORAL ONCE
Status: COMPLETED | OUTPATIENT
Start: 2019-12-07 | End: 2019-12-07

## 2019-12-07 RX ORDER — CYCLOBENZAPRINE HCL 10 MG
10 TABLET ORAL 3 TIMES DAILY PRN
Qty: 21 TABLET | Refills: 0 | Status: SHIPPED | OUTPATIENT
Start: 2019-12-07 | End: 2022-01-28

## 2019-12-07 RX ORDER — HYDROCODONE BITARTRATE AND ACETAMINOPHEN 5; 325 MG/1; MG/1
1 TABLET ORAL EVERY 6 HOURS PRN
Qty: 10 TABLET | Refills: 0 | Status: SHIPPED | OUTPATIENT
Start: 2019-12-07 | End: 2019-12-10

## 2019-12-07 RX ADMIN — HYDROCODONE BITARTRATE AND ACETAMINOPHEN 2 TABLET: 5; 325 TABLET ORAL at 18:31

## 2019-12-07 RX ADMIN — CYCLOBENZAPRINE HYDROCHLORIDE 10 MG: 10 TABLET, FILM COATED ORAL at 18:31

## 2019-12-07 ASSESSMENT — ENCOUNTER SYMPTOMS
NECK PAIN: 1
VOMITING: 0
NAUSEA: 1

## 2019-12-07 ASSESSMENT — MIFFLIN-ST. JEOR: SCORE: 1544.87
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
## 2019-12-07 NOTE — ED AVS SNAPSHOT
Emergency Department  [address] [place] [address]  Phone:  [phone]  Fax:  [phone]                                    Nora Zamorano   MRN: [id number]    Department:   Emergency Department   Date of Visit:  12/7/2019           After Visit Summary Signature Page    I have received my discharge instructions, and my questions have been answered. I have discussed any challenges I see with this plan with the nurse or doctor.    ..........................................................................................................................................  Patient/Patient Representative Signature      ..........................................................................................................................................  Patient Representative Print Name and Relationship to Patient    ..................................................               ................................................  Date                                   Time    ..........................................................................................................................................  Reviewed by Signature/Title    ...................................................              ..............................................  Date                                               Time          22EPIC Rev 08/18
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
## 2020-02-12 ENCOUNTER — TRANSFERRED RECORDS (OUTPATIENT)
Dept: HEALTH INFORMATION MANAGEMENT | Facility: CLINIC | Age: 50
End: 2020-02-12

## 2020-02-12 DIAGNOSIS — J90 PLEURAL EFFUSION: Primary | ICD-10-CM

## 2020-02-13 NOTE — TELEPHONE ENCOUNTER
RECORDS RECEIVED FROM: internal/CE    DATE RECEIVED: 4.15.20   NOTES STATUS DETAILS   OFFICE NOTE from referring provider N/A Self referred    OFFICE NOTE from other specialist Care Everywhere 2.13.20 Rozina Eugene      DISCHARGE SUMMARY from hospital N/A    DISCHARGE REPORT from the ER N/A    MEDICATION LIST Internal    IMAGING  (NEED IMAGES AND REPORTS)     CT SCAN Care Everywhere 2.12.20     CHEST XRAY (CXR) In process 4.15.20- scheduled    TESTS     PULMONARY FUNCTION TESTING (PFT) In process 4.15.20- scheduled       Action 2.13.20 sv   Action Taken Records request sent to  for   CT chest- 2.12.20--- received image and report

## 2020-04-15 ENCOUNTER — VIRTUAL VISIT (OUTPATIENT)
Dept: PULMONOLOGY | Facility: CLINIC | Age: 50
End: 2020-04-15
Attending: INTERNAL MEDICINE
Payer: MEDICARE

## 2020-04-15 ENCOUNTER — PRE VISIT (OUTPATIENT)
Dept: PULMONOLOGY | Facility: CLINIC | Age: 50
End: 2020-04-15

## 2020-04-15 DIAGNOSIS — J90 BILATERAL PLEURAL EFFUSION: Primary | ICD-10-CM

## 2020-04-15 DIAGNOSIS — J90 BILATERAL PLEURAL EFFUSION: ICD-10-CM

## 2020-04-15 DIAGNOSIS — J90 PLEURAL EFFUSION: ICD-10-CM

## 2020-04-15 LAB
ALBUMIN SERPL-MCNC: 3.3 G/DL (ref 3.4–5)
ALP SERPL-CCNC: 73 U/L (ref 40–150)
ALT SERPL W P-5'-P-CCNC: 21 U/L (ref 0–50)
ANION GAP SERPL CALCULATED.3IONS-SCNC: 6 MMOL/L (ref 3–14)
AST SERPL W P-5'-P-CCNC: 13 U/L (ref 0–45)
BILIRUB DIRECT SERPL-MCNC: <0.1 MG/DL (ref 0–0.2)
BILIRUB SERPL-MCNC: 0.3 MG/DL (ref 0.2–1.3)
BUN SERPL-MCNC: 10 MG/DL (ref 7–30)
CALCIUM SERPL-MCNC: 8.3 MG/DL (ref 8.5–10.1)
CHLORIDE SERPL-SCNC: 108 MMOL/L (ref 94–109)
CO2 SERPL-SCNC: 23 MMOL/L (ref 20–32)
CREAT SERPL-MCNC: 0.73 MG/DL (ref 0.52–1.04)
GFR SERPL CREATININE-BSD FRML MDRD: >90 ML/MIN/{1.73_M2}
GLUCOSE SERPL-MCNC: 116 MG/DL (ref 70–99)
NT-PROBNP SERPL-MCNC: 31 PG/ML (ref 0–125)
POTASSIUM SERPL-SCNC: 3.7 MMOL/L (ref 3.4–5.3)
PROT SERPL-MCNC: 7 G/DL (ref 6.8–8.8)
SODIUM SERPL-SCNC: 137 MMOL/L (ref 133–144)
TROPONIN I SERPL-MCNC: 0.02 UG/L (ref 0–0.04)

## 2020-04-15 PROCEDURE — 80048 BASIC METABOLIC PNL TOTAL CA: CPT | Performed by: INTERNAL MEDICINE

## 2020-04-15 PROCEDURE — 36415 COLL VENOUS BLD VENIPUNCTURE: CPT | Performed by: INTERNAL MEDICINE

## 2020-04-15 PROCEDURE — 80076 HEPATIC FUNCTION PANEL: CPT | Mod: TEL | Performed by: INTERNAL MEDICINE

## 2020-04-15 PROCEDURE — 83880 ASSAY OF NATRIURETIC PEPTIDE: CPT | Performed by: INTERNAL MEDICINE

## 2020-04-15 PROCEDURE — 84484 ASSAY OF TROPONIN QUANT: CPT | Performed by: INTERNAL MEDICINE

## 2020-04-15 PROCEDURE — 80076 HEPATIC FUNCTION PANEL: CPT | Performed by: INTERNAL MEDICINE
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
## 2020-10-01 ENCOUNTER — OFFICE VISIT (OUTPATIENT)
Dept: URGENT CARE | Facility: URGENT CARE | Age: 50
End: 2020-10-01
Payer: MEDICARE

## 2020-10-01 ENCOUNTER — ANCILLARY PROCEDURE (OUTPATIENT)
Dept: GENERAL RADIOLOGY | Facility: CLINIC | Age: 50
End: 2020-10-01
Attending: INTERNAL MEDICINE
Payer: MEDICARE

## 2020-10-01 VITALS
TEMPERATURE: 97.5 F | SYSTOLIC BLOOD PRESSURE: 130 MMHG | OXYGEN SATURATION: 96 % | DIASTOLIC BLOOD PRESSURE: 78 MMHG | HEART RATE: 59 BPM | RESPIRATION RATE: 18 BRPM

## 2020-10-01 DIAGNOSIS — R07.81 PLEURITIC CHEST PAIN: Primary | ICD-10-CM

## 2020-10-01 LAB
ALBUMIN SERPL-MCNC: 3.6 G/DL (ref 3.4–5)
ALP SERPL-CCNC: 74 U/L (ref 40–150)
ALT SERPL W P-5'-P-CCNC: 31 U/L (ref 0–50)
ANION GAP SERPL CALCULATED.3IONS-SCNC: 5 MMOL/L (ref 3–14)
AST SERPL W P-5'-P-CCNC: 29 U/L (ref 0–45)
BASOPHILS # BLD AUTO: 0 10E9/L (ref 0–0.2)
BASOPHILS NFR BLD AUTO: 0.3 %
BILIRUB SERPL-MCNC: 0.3 MG/DL (ref 0.2–1.3)
BUN SERPL-MCNC: 13 MG/DL (ref 7–30)
CALCIUM SERPL-MCNC: 8.5 MG/DL (ref 8.5–10.1)
CHLORIDE SERPL-SCNC: 112 MMOL/L (ref 94–109)
CO2 SERPL-SCNC: 23 MMOL/L (ref 20–32)
CREAT SERPL-MCNC: 0.77 MG/DL (ref 0.52–1.04)
D DIMER PPP FEU-MCNC: 0.4 UG/ML FEU (ref 0–0.5)
DIFFERENTIAL METHOD BLD: ABNORMAL
EOSINOPHIL # BLD AUTO: 0 10E9/L (ref 0–0.7)
EOSINOPHIL NFR BLD AUTO: 0.6 %
ERYTHROCYTE [DISTWIDTH] IN BLOOD BY AUTOMATED COUNT: 14.7 % (ref 10–15)
GFR SERPL CREATININE-BSD FRML MDRD: 90 ML/MIN/{1.73_M2}
GLUCOSE SERPL-MCNC: 97 MG/DL (ref 70–99)
HCT VFR BLD AUTO: 37.4 % (ref 35–47)
HGB BLD-MCNC: 11.7 G/DL (ref 11.7–15.7)
LYMPHOCYTES # BLD AUTO: 1.9 10E9/L (ref 0.8–5.3)
LYMPHOCYTES NFR BLD AUTO: 30.2 %
MCH RBC QN AUTO: 28.3 PG (ref 26.5–33)
MCHC RBC AUTO-ENTMCNC: 31.3 G/DL (ref 31.5–36.5)
MCV RBC AUTO: 90 FL (ref 78–100)
MONOCYTES # BLD AUTO: 0.5 10E9/L (ref 0–1.3)
MONOCYTES NFR BLD AUTO: 7.8 %
NEUTROPHILS # BLD AUTO: 3.9 10E9/L (ref 1.6–8.3)
NEUTROPHILS NFR BLD AUTO: 61.1 %
PLATELET # BLD AUTO: 334 10E9/L (ref 150–450)
POTASSIUM SERPL-SCNC: 4.7 MMOL/L (ref 3.4–5.3)
PROT SERPL-MCNC: 7.3 G/DL (ref 6.8–8.8)
RBC # BLD AUTO: 4.14 10E12/L (ref 3.8–5.2)
SODIUM SERPL-SCNC: 140 MMOL/L (ref 133–144)
WBC # BLD AUTO: 6.4 10E9/L (ref 4–11)

## 2020-10-01 PROCEDURE — 80053 COMPREHEN METABOLIC PANEL: CPT | Performed by: INTERNAL MEDICINE

## 2020-10-01 PROCEDURE — 99214 OFFICE O/P EST MOD 30 MIN: CPT | Performed by: INTERNAL MEDICINE

## 2020-10-01 PROCEDURE — 85379 FIBRIN DEGRADATION QUANT: CPT | Performed by: INTERNAL MEDICINE

## 2020-10-01 PROCEDURE — 85025 COMPLETE CBC W/AUTO DIFF WBC: CPT | Performed by: INTERNAL MEDICINE

## 2020-10-01 PROCEDURE — 71046 X-RAY EXAM CHEST 2 VIEWS: CPT | Performed by: RADIOLOGY

## 2020-10-01 PROCEDURE — 36415 COLL VENOUS BLD VENIPUNCTURE: CPT | Performed by: INTERNAL MEDICINE

## 2020-10-01 RX ORDER — HYDROCODONE BITARTRATE AND ACETAMINOPHEN 5; 325 MG/1; MG/1
1 TABLET ORAL EVERY 6 HOURS PRN
Qty: 12 TABLET | Refills: 0 | Status: SHIPPED | OUTPATIENT
Start: 2020-10-01 | End: 2020-10-04

## 2020-10-01 NOTE — PROGRESS NOTES
SUBJECTIVE:  Nora Zamorano, a 50 year old female, presents for evaluation of back pain.  This has been present for the past 5 days and has been escalating over the past several days. This is related to taking a deep breath.  This is in the upper back, more on the right than the left. Has a little cough.  Pain is better if lying on her left side.  She feels short of breath with bending forward.  The character of the pain is a stabbing with a deep breath.  Has some chronic palpitations which is better with propranolol.  Also has been noted to have bilateral pleural effusions which were scheduled for follow-up but she only had virtual visits.  These were discovered incidentally in February this year and followed up in pulmonology.  Were noted to be small.  She does have a history of thyroid cancer which was treated surgically and with radioiodine. Also noting some increased liquid stool and stool urgency with dyspepsia and intestinal cramping. Denies blood in the stool or melena. No nausea or vomiting.  Appetite is ok. She is on levothyroxine for her hypothyroidism.     CMEDS: propranolol, levothyroxine, quetiapine, temazepam, escitalopram, potassium chloride (PRN for symptoms), acetaminophen PRN and ibuprofen PRN (more so recently)    PMH: PTSD, thyroid cancer,     PSH: cholecystectomy, Candida-en-Y gastric bypass, thyroidectomy, hysteroscopy, IUD    ROS:  The following systems have been completely reviewed and are negative except as noted in the HPI: CONSTITUTIONAL, HEAD AND NECK, CARDIOVASCULAR, PULMONARY, GASTROINTESTINAL, DERMATOLOGIC and MUSCULOSKELETAL    OBJECTIVE:  /78   Pulse 59   Temp 97.5  F (36.4  C) (Temporal)   Resp 18   SpO2 96%   GENERAL: obese female, splinting with her breathing  NECK: no adenopathy, no asymmetry, masses, or scars and thyroid normal to palpation  RESP: clear to auscultation and percussion bilaterally; normal I:E ratio  CV: regular rates and rhythm, normal S1 S2, no S3 or S4  and no murmur, click or rub -  ABDOMEN: soft and obese, non-distended, and with mild RUQ tenderness without rebound/guarding.  There is no hepatosplenomegaly or masses and normal bowel sounds.  EXT: no cyanosis, clubbing or edema; peripheral pulses are brisk and symmetric in the radials, dorsalis pedis and posterior tibials bilaterally  M/SKEL: no pain in the right shoulder with FROM both actively and passively; no chest wall tenderness in the posterior thorax; some chest wall tenderness at the right lower costal margin    LAB:  Recent Labs   Lab Test 10/01/20  1721   WBC 6.4   RBC 4.14   HGB 11.7   HCT 37.4   MCV 90   MCH 28.3   MCHC 31.3*   RDW 14.7        D-dimer is normal    Recent Labs   Lab Test 10/01/20  1721 04/15/20  1509    137   POTASSIUM 4.7 3.7   CHLORIDE 112* 108   CO2 23 23   BUN 13 10   CR 0.77 0.73   GLC 97 116*     Recent Labs   Lab Test 10/01/20  1721   AST 29   ALT 31   ALKPHOS 74   ALBUMIN 3.6     Bilirubin is normal    CXR, which I have personally reviewed and interpreted, shows no mass or infiltrate.  Pleural effusions, if present, are very small based upon some blunting of the costophrenic angle.  The cardiac silhouette is normal without pulmonary vascular congestion.    ASSESSMENT/PLAN:    ICD-10-CM    1. Pleuritic chest pain  R07.81 XR Chest 2 Views     Comprehensive metabolic panel (BMP + Alb, Alk Phos, ALT, AST, Total. Bili, TP)     CBC with platelets and differential     D dimer, quantitative     HYDROcodone-acetaminophen (NORCO) 5-325 MG tablet    Considered extensive differential diagnosis for acute pleuritic right chest pain that is posterior and positional.  This pain suggests a process that is abutting the pleura and or the diaphragm on the right side.  Consider pulmonary embolus or infarct, pneumonia or empyema, mass lesion, inflammatory hepatic or biliary process.  Destructive lesions of the chest wall also possible though the acuity argues for different process.  The  imaging and laboratory studies, fortunately rule out significant pulmonary processes, infectious or inflammatory hepatitis, acute biliary disease.  The normal alkaline phosphatase helps to reduce the likelihood of a destructive bony lesion.  Remaining possible explanations for a pleuritic right posterior thoracic pain with associated right upper quadrant and right costal margin tenderness include pleuritis, costochondritis (less likely), perihepatitis, colitis (focal hepatic flexure issues).  A neoplastic process in the right upper quadrant has not been completely excluded.  At this point, will treat symptoms with analgesics in limited amounts and have the patient observe the evolution of her pain.  She should f/u with PCP next week.  If pain worsens, new symptoms evolve, then she may benefit from CT imaging of the chest/abd/pelvis or other w/u as directed by the clinical evolution.                Pradip Paul MD

## 2020-10-01 NOTE — PATIENT INSTRUCTIONS
X-ray looks good as does your blood work.  This could be a viral infection (not COVID) that is either causing some pleuritis or colitis that will get better on it's own.    It will be important to follow-up with your primary care physician in case this is not getting better as some further imaging may be necessary.

## 2021-01-10 ENCOUNTER — HEALTH MAINTENANCE LETTER (OUTPATIENT)
Age: 51
End: 2021-01-10

## 2021-01-19 ENCOUNTER — NURSE TRIAGE (OUTPATIENT)
Dept: NURSING | Facility: CLINIC | Age: 51
End: 2021-01-19

## 2021-01-19 NOTE — TELEPHONE ENCOUNTER
History of low iron    Dizziness and headache   -symtpoms started Sunday 1/17/21    Appointment scheduled for Friday    Wondering if there are orders for an infusion     Patient usually goes to [facility] and that is who would have the orders.     Advised we would not be able to see these orders. Advised to call [facility]. Patient is agreeable.     COVID 19 Nurse Triage Plan/Patient Instructions    Please be aware that novel coronavirus (COVID-19) may be circulating in the community. If you develop symptoms such as fever, cough, or SOB or if you have concerns about the presence of another infection including coronavirus (COVID-19), please contact your health care provider or visit [email or web address].     Disposition/Instructions    Virtual Visit with provider recommended. Reference Visit Selection Guide.  In-Person Visit with provider recommended. Reference Visit Selection Guide.    Thank you for taking steps to prevent the spread of this virus.  o Limit your contact with others.  o Wear a simple mask to cover your cough.  o Wash your hands well and often.    Resources    [facility]: About COVID-19: [email or web address]    CDC: What to Do If You're Sick: www.cdc.gov/coronavirus/2019-ncov/about/steps-when-sick.html    CDC: Ending Home Isolation: www.cdc.gov/coronavirus/2019-ncov/hcp/disposition-in-home-patients.html     CDC: Caring for Someone: www.cdc.gov/coronavirus/2019-ncov/if-you-are-sick/care-for-someone.html     [facility]: Interim Guidance for Hospital Discharge to Home: www.health.[facility].[place]/diseases/coronavirus/hcp/hospdischarge.pdf    [facility] clinical trials (COVID-19 research studies): clinicalaffairs.[facility]/n-clinical-trials     Below are the COVID-19 hotlines at the [facility] of Health ([facility]). Interpreters are available.   o For health questions: Call 035-213-6087 or 1-280.615.2861 (7 a.m. to 7 p.m.)  o For questions about schools and childcare: Call 382-507-4773 or  5-131-827-5049 (7 a.m. to 7 p.m.)         Reason for Disposition    Requesting regular office appointment    Additional Information    Negative: [1] Caller is not with the adult (patient) AND [2] reporting urgent symptoms    Negative: Lab result questions    Negative: Medication questions    Negative: Caller can't be reached by phone    Negative: Caller has already spoken to PCP or another triager    Negative: RN needs further essential information from caller in order to complete triage    Protocols used: INFORMATION ONLY CALL-A-    Zahraa Perez RN on 1/19/2021 at 4:43 AM

## 2021-01-20 DIAGNOSIS — J90 CHRONIC BILATERAL PLEURAL EFFUSIONS: Primary | ICD-10-CM

## 2021-01-20 NOTE — CONFIDENTIAL NOTE
Re-ordered ECHO with diagnosis of bilateral pleural effusion and concern for CHF.     Lindsey Anand MD  Pulmonary, Allergy, Critical Care, and Sleep Medicine   [facility]   Pager: [phone]

## 2021-02-08 ENCOUNTER — HOSPITAL ENCOUNTER (OUTPATIENT)
Dept: CARDIOLOGY | Facility: CLINIC | Age: 51
Discharge: HOME | End: 2021-02-08
Attending: INTERNAL MEDICINE | Admitting: INTERNAL MEDICINE
Payer: MEDICARE

## 2021-02-08 DIAGNOSIS — I50.810 RIGHT HEART FAILURE (H): ICD-10-CM

## 2021-02-08 PROCEDURE — 93306 TTE W/DOPPLER COMPLETE: CPT

## 2021-02-08 PROCEDURE — 93306 TTE W/DOPPLER COMPLETE: CPT | Mod: 26 | Performed by: INTERNAL MEDICINE

## 2021-03-13 ENCOUNTER — HEALTH MAINTENANCE LETTER (OUTPATIENT)
Age: 51
End: 2021-03-13

## 2021-06-11 ENCOUNTER — COMMUNICATION - HEALTHEAST (OUTPATIENT)
Dept: SCHEDULING | Facility: CLINIC | Age: 51
End: 2021-06-11

## 2021-06-25 NOTE — TELEPHONE ENCOUNTER
"Triage call:    Caller: Patient    She has had a cough all week and starting today, with any exertion, she is very short of breath and \"can't catch my breath\".  Her O2 levels are 94% and .  She is only able to get out a few words at a time and sounds out of breath.        Pt was advised of protocol recommendation/disposition of ED    Lydia Ospina RN 06/11/21 8:04 PM   [facility] Nurse Advisor      COVID 19 Nurse Triage Plan/Patient Instructions    Please be aware that novel coronavirus (COVID-19) may be circulating in the community. If you develop symptoms such as fever, cough, or SOB or if you have concerns about the presence of another infection including coronavirus (COVID-19), please contact your health care provider or visit [email or web address].     Disposition/Instructions    ED Visit recommended. Follow protocol based instructions.      Bring Your Own Device:  Please also bring your smart device(s) (smart phones, tablets, laptops) and their charging cables for your personal use and to communicate with your care team during your visit.      Thank you for taking steps to prevent the spread of this virus.  o Limit your contact with others.  o Wear a simple mask to cover your cough.  o Wash your hands well and often.    Resources    [facility]: About COVID-19: [email or web address]    CDC: What to Do If You're Sick: www.cdc.gov/coronavirus/2019-ncov/about/steps-when-sick.html    CDC: Ending Home Isolation: www.cdc.gov/coronavirus/2019-ncov/hcp/disposition-in-home-patients.html     CDC: Caring for Someone: www.cdc.gov/coronavirus/2019-ncov/if-you-are-sick/care-for-someone.html     [facility]: Interim Guidance for Hospital Discharge to Home: www.health.Counts include 234 beds at the [facility].[place]/diseases/coronavirus/hcp/hospdischarge.pdf    [facility] clinical trials (COVID-19 research studies): clinicalaffairs.[facility]/umn-clinical-trials     Below are the COVID-19 hotlines at the [place] [facility] " ([facility]). Interpreters are available.   o For health questions: Call 448-918-1769 or 1-666.396.1044 (7 a.m. to 7 p.m.)  o For questions about schools and childcare: Call 683-450-0797 or 1-144.547.2975 (7 a.m. to 7 p.m.)     Reason for Disposition    MODERATE difficulty breathing (e.g., speaks in phrases, SOB even at rest, pulse 100-120)    Additional Information    Negative: SEVERE difficulty breathing (e.g., struggling for each breath, speaks in single words)    Negative: Difficult to awaken or acting confused (e.g., disoriented, slurred speech)    Negative: Bluish (or gray) lips or face now    Negative: Shock suspected (e.g., cold/pale/clammy skin, too weak to stand, low BP, rapid pulse)    Negative: Sounds like a life-threatening emergency to the triager    Negative: [1] COVID-19 exposure AND [2] has not completed COVID-19 vaccine series AND [3] no symptoms    Negative: [1] COVID-19 exposure AND [2] completed COVID-19 vaccine series (fully vaccinated) AND [3] no symptoms    Negative: COVID-19 vaccine reaction suspected (e.g., fever, headache, muscle aches) occurring during days 1-3 after getting vaccine    Negative: COVID-19 vaccine, questions about    Negative: [1] COVID-19 vaccine series completed (fully vaccinated) in past 3 months AND [2] new-onset of COVID-19 symptoms BUT [3] no known exposure    Negative: [1] Had lab test confirmed COVID-19 infection within last 3 monthsAND [2] new-onset of COVID-19 symptoms BUT [3] no known exposure    Negative: [1] Lives with someone known to have influenza (flu test positive) AND [2] flu-like symptoms (e.g., cough, runny nose, sore throat, SOB; with or without fever)    Negative: [1] Adult with possible COVID-19 symptoms AND [2] triager concerned about severity of symptoms or other causes    Negative: COVID-19 and breastfeeding, questions about    Negative: SEVERE or constant chest pain or pressure (Exception: mild central chest pain, present only when  coughing)    Protocols used: CORONAVIRUS (COVID-19) DIAGNOSED OR [id number]-[id number] 3.25.21

## 2021-10-23 ENCOUNTER — HEALTH MAINTENANCE LETTER (OUTPATIENT)
Age: 51
End: 2021-10-23

## 2021-11-10 ENCOUNTER — TELEPHONE (OUTPATIENT)
Dept: BEHAVIORAL HEALTH | Facility: CLINIC | Age: 51
End: 2021-11-10
Payer: MEDICARE

## 2021-11-10 NOTE — TELEPHONE ENCOUNTER
Patient called to schedule MH eval for Dec 1 with Tammy Pascal via video. Phone number is correct, pt is aware of Mychart video.

## 2021-11-30 ENCOUNTER — TELEPHONE (OUTPATIENT)
Dept: BEHAVIORAL HEALTH | Facility: CLINIC | Age: 51
End: 2021-11-30
Payer: MEDICARE

## 2021-11-30 ASSESSMENT — ANXIETY QUESTIONNAIRES
7. FEELING AFRAID AS IF SOMETHING AWFUL MIGHT HAPPEN: SEVERAL DAYS
5. BEING SO RESTLESS THAT IT IS HARD TO SIT STILL: NOT AT ALL
8. IF YOU CHECKED OFF ANY PROBLEMS, HOW DIFFICULT HAVE THESE MADE IT FOR YOU TO DO YOUR WORK, TAKE CARE OF THINGS AT HOME, OR GET ALONG WITH OTHER PEOPLE?: SOMEWHAT DIFFICULT
3. WORRYING TOO MUCH ABOUT DIFFERENT THINGS: MORE THAN HALF THE DAYS
GAD7 TOTAL SCORE: 12
GAD7 TOTAL SCORE: 12
7. FEELING AFRAID AS IF SOMETHING AWFUL MIGHT HAPPEN: SEVERAL DAYS
1. FEELING NERVOUS, ANXIOUS, OR ON EDGE: MORE THAN HALF THE DAYS
4. TROUBLE RELAXING: NEARLY EVERY DAY
6. BECOMING EASILY ANNOYED OR IRRITABLE: NEARLY EVERY DAY
2. NOT BEING ABLE TO STOP OR CONTROL WORRYING: SEVERAL DAYS
GAD7 TOTAL SCORE: 12

## 2021-11-30 ASSESSMENT — PATIENT HEALTH QUESTIONNAIRE - PHQ9
10. IF YOU CHECKED OFF ANY PROBLEMS, HOW DIFFICULT HAVE THESE PROBLEMS MADE IT FOR YOU TO DO YOUR WORK, TAKE CARE OF THINGS AT HOME, OR GET ALONG WITH OTHER PEOPLE: VERY DIFFICULT
SUM OF ALL RESPONSES TO PHQ QUESTIONS 1-9: 11
SUM OF ALL RESPONSES TO PHQ QUESTIONS 1-9: 11

## 2021-12-01 ENCOUNTER — HOSPITAL ENCOUNTER (OUTPATIENT)
Dept: BEHAVIORAL HEALTH | Facility: CLINIC | Age: 51
Discharge: HOME | End: 2021-12-01
Attending: FAMILY MEDICINE | Admitting: FAMILY MEDICINE
Payer: MEDICARE

## 2021-12-01 PROCEDURE — 90791 PSYCH DIAGNOSTIC EVALUATION: CPT | Mod: GT | Performed by: COUNSELOR

## 2021-12-01 ASSESSMENT — PATIENT HEALTH QUESTIONNAIRE - PHQ9: SUM OF ALL RESPONSES TO PHQ QUESTIONS 1-9: 11

## 2021-12-01 ASSESSMENT — ANXIETY QUESTIONNAIRES: GAD7 TOTAL SCORE: 12
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
## 2021-12-02 ENCOUNTER — BEH TREATMENT PLAN (OUTPATIENT)
Dept: BEHAVIORAL HEALTH | Facility: CLINIC | Age: 51
End: 2021-12-02
Attending: PSYCHIATRY & NEUROLOGY
Payer: MEDICARE

## 2021-12-02 DIAGNOSIS — F43.10 POSTTRAUMATIC STRESS DISORDER: ICD-10-CM

## 2021-12-03 ENCOUNTER — TELEPHONE (OUTPATIENT)
Dept: BEHAVIORAL HEALTH | Facility: CLINIC | Age: 51
End: 2021-12-03
Payer: MEDICARE

## 2021-12-03 NOTE — PROGRESS NOTES
"RN Review of Medical History / Physical Health Screen  Outpatient Mental Health Programs - [facility] Adult Mental Health Day Treatment    PATIENT'S NAME: Nora Zamorano  MRN:   [id number]  :   1970  ACCT. NUMBER: [id number]  CURRENT AGE:  51 year old    DATE OF DIAGNOSTIC ASSESSMENT: 21  DATE OF ADMISSION: 21     Please see Diagnostic Assessment for additional Medical History.     General Health:   Have you had any exposure to any communicable disease in the past 2-3 weeks? no     Are you aware of safe sex practices? yes   Do you have a history of seizures?     If so, do you have a seizure plan? Known triggers?     Notify patient that we will call 911 (if virtual) or a code (if in-person), if we were to witness seizure during group. no    no  no    n/a     Nutrition:    Are you on a special diet? If yes, please explain:  no   Do you have any concerns regarding your nutritional status? If yes, please explain:  no   Have you had any appetite changes in the last 3 months?  No     Have you had any weight loss or weight gain in the last 3 months?  No     Do you have a history of an eating disorder? no   Do you have a history of being in an eating disorder program? no     Patient height and weight recorded by RN in epic flowsheet: no No; Unable to measure  Because of temporary in-person programmatic suspension due to COVID-19 pandemic, all pt weights and heights will be collected through patient self-report an recorded in physical health screening progress note upon admission to the program.                            Height/Weight Review:  Patient reported height:     5'6\"   Patient reports weight:  Date last checked:  193 pounds   Any referrals/needs identified?                BMI Review:  Was the patient informed of BMI? no      Findings See above         Fall Risk:   Have you had any falls in the past 3 months? no     Do you currently use any assistive devices for mobility?   no "      Additional Comments/Assessment: Pt denies seizures (current/historical), dizziness, mobility concerns. No fall risk assessed; No safety concerns r/t falls.      Per completion of the Medical History / Physical Health Screen, is there a recommendation to see / follow up with a primary care physician/clinic or dentist?    No.      Dary Rondon RN  12/3/2021

## 2021-12-03 NOTE — TELEPHONE ENCOUNTER
Writer called to conduct admission.  Sent PHQ9 and GAD7 to [facility].  Dary Rondon RN on 12/3/2021 at 3:33 PM

## 2021-12-03 NOTE — PROGRESS NOTES
"    Admission SBAR NOTE  Adult  Outpatient Programs          SITUATION:     Admission Date: 12/6/2021    Patient name:  Nora Zamorano  Preferred name: Nora Irving She/Her/Hers/Herself 51 year old  Diagnosis:   (F43.10) Posttraumatic Stress Disorder      Other Diagnoses that is relevant to services:   296.32 (F33.1) Major Depressive Disorder, Recurrent Episode, Moderate;   300.02 (F41.1) Generalized Anxiety Disorder    Assigned Program/Track: ADT 1B    Reviewed patient's schedule and informed them of any variation due to late days (PHP) or Holidays. yes  Patient anticipated absences or requests: yes has 3 appts with IT (EMDR, [employer] therapy) from 2-3pm - will change them for the future - 12/20, 12/27, 12/30.   NOTE: impact of transportation, childcare, work, or housing concerns.    Insurance: Payor: MEDICARE / Plan: MEDICARE / Product Type: Medicare /  Changes/Concerns: no    Does patient need an appointment with the program provider? yes 12/7 at 10am  NOTE: If yes, please schedule provider visit.       BACKGROUND:     Patient's stated goal/reason for treatment (copy from DA; confirm with patient): \"\"Depression mostly, with some anxiety. I sleep well from medication but have a lot of trouble during daytime. I am good in groups with giving and receiving support.\"        ASSESSMENT:     Please consult  if any of the following concerns may impact admission/participation in program:       Reviewed that  [place] Suicide Severity Rating (Lifetime/Recent) completed during Diagnostic Assessment. If  no, notify supervisor for follow up action.  no Supervisor has been notified; will address with leadership  [place] Suicide Severity Rating (select Lifetime/Recent): [place] Suicide Severity Rating Scale (Lifetime/Recent)No flowsheet data found.    Copy/Paste current Safety Plan to the BEH TX PLAN ENCOUNTER. no none present in DA    Safety status/concerns: none     Substance use concerns: no  - has " medical marijuana card (uses SL spray, helps w/ anxiety)    Pertinent Medical/Nutritional concerns: yes Hx of thyroid cancer     Review Tele-Health Requirements (including secure environment, confidentiality, in-state status, equipment needs and process - encourage MyChart): yes    Paper or Docusign requirements for ROIs, e-ASH, emergency contact, etc have been completed? yes  NOTE: whenever possible, have patient complete process during admission.     Confirm Emergency Contact listed in the SnapShot/Demographics with patient and notify OBC if an update is required. yes   Emergency Contact Rajesh Spain   Home Phone [phone]   Rel to Pt Friend       Other concerns or barriers to admission/treatment? no    Does patient have FMLA or Short-Term Disability requests/plans? no  NOTE: Whenever possible, FMLA or Short-Term Disability paperwork needs to be managed/completed by the patient's community provider.   Exceptions: Patient does not have a community provider AND request is specific to mental health and time off for the duration of the program participation.    Notify RN Triage as soon as possible.     Care Providers/Medication Management Needs:     Does the patient:   currently take MH medications?   yes Lexapro 20mg daily, seroquel 100-200mg HS, Temazepam 30mg HS, medical cannabis (new)     want to take MH medications? yes but provider just left practice, concerned about continuity of care; will contact clinic       Who does the patient want prescribing/managing medication while in program/treatment?       Dr Menjivar: no but may need an interim consult, as she is in between providers at her clinic (she will call today/Monday to see)   NOTE: Inform patient this is NOT a transfer of care and will only be available during program/treatment.  Community Provider: yes    Does patient have a current community or other [facility] provider prescribing medications for mental health? yes see above re: scheduling with new  provider  NOTE: Delete below if not applicable:    Psychiatric Provider (or PCP if managing MH meds):   Provider name:  Liberty Contreras (in between providers)  [facility]location[facility]  Last appointment:  Last month  Next appointment:  To be scheduled     Patient will continue to see above provider while participating in program: yes     Individual Therapist:   Provider name:  Jeff Polk  Clinic name/location: [facility]  Last appointment:  weekly  Next appointment:  Weekly      Patient will continue to see above provider while participating in program: yes        RECOMMENDATIONS:     Patient Admission Completed: yes    Care Team, referrals made/needed: no  PCP: Rozina Eugene  NOTE: Notify RN, as needed, to make internal referrals.                                                             Completed by: Dary Rondon RN

## 2021-12-06 ENCOUNTER — HOSPITAL ENCOUNTER (OUTPATIENT)
Dept: BEHAVIORAL HEALTH | Facility: CLINIC | Age: 51
End: 2021-12-06
Attending: PSYCHIATRY & NEUROLOGY
Payer: MEDICARE

## 2021-12-06 PROCEDURE — G0177 OPPS/PHP; TRAIN & EDUC SERV: HCPCS | Mod: PO

## 2021-12-06 PROCEDURE — 90837 PSYTX W PT 60 MINUTES: CPT | Mod: PO,59 | Performed by: PSYCHOLOGIST

## 2021-12-06 PROCEDURE — 90837 PSYTX W PT 60 MINUTES: CPT | Mod: 95 | Performed by: PSYCHOLOGIST

## 2021-12-06 PROCEDURE — 90853 GROUP PSYCHOTHERAPY: CPT | Mod: PO | Performed by: COUNSELOR

## 2021-12-06 RX ORDER — LEVOTHYROXINE SODIUM 200 UG/1
200 TABLET ORAL DAILY
COMMUNITY

## 2021-12-06 ASSESSMENT — ANXIETY QUESTIONNAIRES
IF YOU CHECKED OFF ANY PROBLEMS ON THIS QUESTIONNAIRE, HOW DIFFICULT HAVE THESE PROBLEMS MADE IT FOR YOU TO DO YOUR WORK, TAKE CARE OF THINGS AT HOME, OR GET ALONG WITH OTHER PEOPLE: EXTREMELY DIFFICULT
3. WORRYING TOO MUCH ABOUT DIFFERENT THINGS: SEVERAL DAYS
7. FEELING AFRAID AS IF SOMETHING AWFUL MIGHT HAPPEN: MORE THAN HALF THE DAYS
5. BEING SO RESTLESS THAT IT IS HARD TO SIT STILL: NOT AT ALL
GAD7 TOTAL SCORE: 13
1. FEELING NERVOUS, ANXIOUS, OR ON EDGE: NEARLY EVERY DAY
2. NOT BEING ABLE TO STOP OR CONTROL WORRYING: NEARLY EVERY DAY
6. BECOMING EASILY ANNOYED OR IRRITABLE: NEARLY EVERY DAY

## 2021-12-06 ASSESSMENT — PATIENT HEALTH QUESTIONNAIRE - PHQ9: 5. POOR APPETITE OR OVEREATING: SEVERAL DAYS

## 2021-12-06 NOTE — GROUP NOTE
"Process Group Note    PATIENT'S NAME: Nora Zamorano  MRN:   [id number]  :   1970  ACCT. NUMBER: [id number]  DATE OF SERVICE: 21  START TIME:  1:00 PM  END TIME:  1:50 PM  FACILITATOR: Lindy Elizondo [facility]  TOPIC:  Process Group    Diagnoses:  309.81 (F43.10) Posttraumatic Stress Disorder    4. Other Diagnoses that is relevant to services:   296.32 (F33.1) Major Depressive Disorder, Recurrent Episode, Moderate; 300.02 (F41.1) Generalized Anxiety Disorder      [facility] Mental Health Day Treatment  TRACK: 1B    NUMBER OF PARTICIPANTS: 3                                      Service Modality:  Video Visit     Telemedicine Visit: The patient's condition can be safely assessed and treated via synchronous audio and visual telemedicine encounter.      Reason for Telemedicine Visit: Services only offered telehealth    Originating Site (Patient Location): Patient's home    Distant Site (Provider Location): Provider Remote Setting- Home Office    Consent:  The patient/guardian has verbally consented to: the potential risks and benefits of telemedicine (video visit) versus in person care; bill my insurance or make self-payment for services provided; and responsibility for payment of non-covered services.     Patient would like the video invitation sent by:  My Chart    Mode of Communication:  Video Conference via Medical Zoom    As the provider I attest to compliance with applicable laws and regulations related to telemedicine.                Data:    Session content: At the start of this group, patients were invited to check in by identifying themselves, describing their current emotional status, and identifying issues to address in this group.   Area(s) of treatment focus addressed in this session included Symptom Management, Personal Safety and Abstinence/Relapse Prevention.    Nora Irving reported feeling \"excited bu nervous\" to start group today.  Her goal is to be present in group.  Patient " declined additional process time but contributed to group discussion and provided feedback and support to peers.      Therapeutic Interventions/Treatment Strategies:  Psychotherapist offered support, feedback and validation and reinforced use of skills.    Assessment:    Patient response:   Patient responded to session by accepting feedback, giving feedback and listening    Possible barriers to participation / learning include: and no barriers identified    Health Issues:   None reported       Substance Use Review:   Substance Use: No active concerns identified.    Mental Status/Behavioral Observations  Appearance:   Appropriate   Eye Contact:   Good   Psychomotor Behavior: Normal   Attitude:   Cooperative   Orientation:   All  Speech   Rate / Production: Normal    Volume:  Normal   Mood:    Anxious  Depressed   Affect:    Appropriate   Thought Content:   Clear  Thought Form:  Coherent  Logical     Insight:    Good     Plan:     Safety Plan: No current safety concerns identified.  Recommended that patient call 911 or go to the local ED should there be a change in any of these risk factors.     Barriers to treatment: None identified    Patient Contracts (see media tab):  None    Substance Use: Not addressed in session     Continue or Discharge: Patient will continue in Adult Day Treatment (ADT)  as planned. Patient is likely to benefit from learning and using skills as they work toward the goals identified in their treatment plan.      Lindy Elizondo, [facility]  December 6, 2021

## 2021-12-06 NOTE — GROUP NOTE
Psychoeducation Group Note    PATIENT'S NAME: Nora Zamorano  MRN:   [id number]  :   1970  ACCT. NUMBER: [id number]  DATE OF SERVICE: 21  START TIME:  2:00 PM  END TIME:  2:50 PM  FACILITATOR: Taras Devi OTR/L  TOPIC: MH Life Skills Group: Resiliency Development                                    Service Modality:  Video Visit     Telemedicine Visit: The patient's condition can be safely assessed and treated via synchronous audio and visual telemedicine encounter.      Reason for Telemedicine Visit: Services only offered telehealth    Originating Site (Patient Location): Patient's home    Distant Site (Provider Location): Provider Remote Setting- Home Office    Consent:  The patient/guardian has verbally consented to: the potential risks and benefits of telemedicine (video visit) versus in person care; bill my insurance or make self-payment for services provided; and responsibility for payment of non-covered services.     Mode of Communication:  Video Conference via Medical Zoom    As the provider I attest to compliance with applicable laws and regulations related to telemedicine.       [facility] Adult Mental Health Day Treatment  TRACK: 1B    NUMBER OF PARTICIPANTS: 5    Summary of Group / Topics Discussed:  Resiliency Development:  Coping Skills(6 dimensions of a healthy lifestyle to manage stress): Patients were taught how to identify stressors, signs of stress, coping skills, and prevention strategies for overall stress management.  Patients were given the opportunity to identify both ongoing and acute mental health symptoms and how to effectively manage these symptoms by developing an effective aftercare plan.  Patients increased awareness of community based resources.    Patient Session Goals / Objectives:    Identified how using coping skills can be used for symptom and stress management       Improved awareness of individualed symptoms and stressors and how to effectively cope      Established a relapse prevention plan to practice these skills in their own environments    Practiced and reflected on how to generalize taught skills to their everyday life        Patient Participation / Response:  Fully participated with the group by sharing personal reflections / insights and openly received / provided feedback with other participants.    Demonstrated understanding of content through handout/gorup discussion , Verbalized understanding of content and Patient would benefit from additional opportunities to practice the content to be able to generalize it to their everyday life with increased intentionality, consistency, and efficacy in support of their psychiatric recovery    Treatment Plan:  Patient has a current master individualized treatment plan.  See Epic treatment plan for more information.    Taras Devi, OTR/L

## 2021-12-06 NOTE — PROGRESS NOTES
[facility] Adult Mental Health Day Treatment  TRACK: 1B    PATIENT'S NAME: Nora Zamorano  MRN:   [id number]  :   1970  ACCT. NUMBER: [id number]  DATE OF SERVICE: 21   START TIME: 1500  END TIME: 1600      Telemedicine Visit: The patient's condition can be safely assessed and treated via synchronous audio and visual telemedicine encounter.      Reason for Telemedicine Visit: Patient unable to travel due to COVID 19    Originating Site (Patient Location): Patient's home    Distant Site (Provider Location): Provider Remote Setting    Consent:  The patient/guardian has verbally consented to: the potential risks and benefits of telemedicine (video visit) versus in person care; bill my insurance or make self-payment for services provided; and responsibility for payment of non-covered services.     Mode of Communication:  Video Conference via FastSoft    As the provider I attest to compliance with applicable laws and regulations related to telemedicine.    ATTENDEES: Patient and this author    Rating Scales:    PHQ9:    PHQ-9 SCORE 2021   PHQ-9 Total Score - - -   PHQ-9 Total Score MyChart 11 (Moderate depression) 10 (Moderate depression) -   PHQ-9 Total Score - 10 10   Some encounter information is confidential and restricted. Go to Review Flowsheets activity to see all data.   ;    GAD7:    JEVON-7 SCORE 2021   Total Score 12 (moderate anxiety) 12 (moderate anxiety) -   Total Score - 12 13   Some encounter information is confidential and restricted. Go to Review Flowsheets activity to see all data.     CGI:     First:Considering your total clinical experience with this particular patient population, how severe are the patient's symptoms at this time?: 4 (2021  3:11 PM)  ;  Most recentCompared to the patient's condition at the START of treatment, this patient's condition is: 4 (2021  3:11 PM)        DATA    Treatment Objective(s) Addressed in  "This Session:  The purpose of today's call is for this author to provide oversight of patient's care while receiving program services. Specific treatment goals addressed included personal safety, symptoms stabilization and management, wellness and mental health, and community resources/discharge planning.     Patient identified the following initial areas for treatment focus: depression and anxiety, with feeling like a failure, and irritability, PTSD hyper-vigilance, hopelessness, low energy, low motivation, \"turning in on myself\"    PTSD:  Nightmares: decreased, with therapy  Flashbacks:  Yes  Hyper-vigilance: yes  Avoidance of people/places: yes  Trauma: from childhood, with developmental trauma and into adulthood  She is seeing a trauma therapist     She reported that she got approved for PTSD and medical cannabis.    Psychosis:  Denies auditory, visual, hallucinations, paranioa    OCD:  Denies checking or cleaning rituals or behaviors    Eating Disorders  Denies problems with anorexia or bulimia    Mood:  Denies mine symptoms of increased mood, grandiosity, decreased need for sleep, increased activity, etc.  Depression, anxiety: yes      Panic attacks:  They have decreased in symptoms, and she reported she gets hot flashes, sweats, racing thoughts,   She denied shortness of breath, chest aches, blurry vision, ringing in her ears, chest aches, stomach aches, hyper-ventilating,    She reported that she has done individual therapy and DBT, and PHP and IOP groups in the past and has lowered anxiety levels.      Current Stressors / Issues:  During today's visit, this author identified herself, the purpose of the visit and her role of provider oversight while patient is participating in the program. In addition, this author assessed the patient's mental health diagnoses and symptoms. The patient reports they currently manage mental health symptoms by Dr. Contreras, [facility].  Patient reports relief from their " presenting issue problems with depression and anxiety. She reported that it works for her sleep, but continues to have problems with the anxiety and depression.    Patient reports  effectiveness of current medications managed by: Glen Tamayo [facility].. Patient reports that their medications have changed. . Patient reports that their current medication provider is aware of these changes.     Patient reports current meds as:   Outpatient Medications Marked as Taking for the 12/6/21 encounter (Hospital Encounter) with Lanette Lorenzana PsyD   Medication Sig     escitalopram (LEXAPRO) 20 MG tablet Take 1 tablet by mouth daily.     levothyroxine (SYNTHROID/LEVOTHROID) 200 MCG tablet Take 200 mcg by mouth daily     QUEtiapine (SEROQUEL) 200 MG tablet Take 200-400 mg by mouth nightly as needed     temazepam (RESTORIL) 15 MG capsule Take 30 mg by mouth nightly as needed      triamcinolone (KENALOG) 0.1 % external cream Apply topically as needed for irritation       Medication Adherence:  Patient reports taking prescribed medications as prescribed.    Patient  reports  that they plan to continue to work with their individual therapist and/or medication provider. They were urged to continue services.    Patient identified that continuing with the Adult Day [place] program would be beneficial for their care moving forward.     Progress on Treatment Objective(s) / Homework:  Not applicable to current visit    Therapeutic Interventions/Treatment Strategies:  Support, Limit/Boundaries, Structured Activity, Problem Solving, Clarification and Education    Response to Treatment Strategies:  Accepted Feedback, Gave Feedback, Focused on Goals, Attentive and Alert    Changes in Health Issues:  None reported    Chemical Use Review:  No substance use concerns reported / identified  Sober, never used    Assessment: Current Emotional / Mental Status (status of significant symptoms):    Risk status (Self / Other harm or suicidal ideation)  Patient  has had a history of suicidal ideation: some, but no intent or plan and suicide attempts: many years ago, overdose, 2013, and brought to hospital  Patient denies current fears or concerns for personal safety.  Patient denies current or recent suicidal ideation or behaviors.  Patient denies current or recent homicidal ideation or behaviors.  Patient denies current or recent self injurious behavior or ideation.  Patient denies other safety concerns.  A safety and risk management plan has not been developed at this time, however patient was encouraged to call [place] [phone] should there be a change in any of these risk factors.    Appearance:   Appropriate   Eye Contact:   Good   Psychomotor Behavior: Normal    Attitude:   Cooperative   Orientation:   All  Speech   Rate / Production: Normal    Volume:  Normal   Mood:    Anxious  Depressed  Sad   Affect:    Constricted   Thought Content:  Clear  flashbacks  Thought Form:  Coherent  Logical   Insight:    Good     Diagnoses:     309.81 (F43.10) Posttraumatic Stress Disorder      296.32 (F33.1) Major Depressive Disorder, Recurrent Episode, Moderate;   300.02 (F41.1) Generalized Anxiety Disorder    Plan/Recommendations: (Homework, other):  This author will follow up with the patient in approximately 30 days.    Patient continues to meet criteria for recommended level of care: continue in the 1B track in Adult Day Treatment  Patient would be at reasonable risk of requiring a higher level of care in the absence of current services.    Patient does agree with the current plan of care.    Mary Boyd D,  Licensed Clinical Psychologist    12/6/2021

## 2021-12-07 ASSESSMENT — ANXIETY QUESTIONNAIRES: GAD7 TOTAL SCORE: 13

## 2021-12-07 ASSESSMENT — PATIENT HEALTH QUESTIONNAIRE - PHQ9: SUM OF ALL RESPONSES TO PHQ QUESTIONS 1-9: 10

## 2021-12-08 ENCOUNTER — HOSPITAL ENCOUNTER (OUTPATIENT)
Dept: BEHAVIORAL HEALTH | Facility: CLINIC | Age: 51
End: 2021-12-08
Attending: PSYCHIATRY & NEUROLOGY
Payer: MEDICARE

## 2021-12-08 PROCEDURE — 90853 GROUP PSYCHOTHERAPY: CPT | Mod: GT | Performed by: COUNSELOR

## 2021-12-08 PROCEDURE — G0177 OPPS/PHP; TRAIN & EDUC SERV: HCPCS | Mod: GT

## 2021-12-08 NOTE — PROGRESS NOTES
Adult Day Treatment Program:  Individualized Treatment Plan       Date of Plan: 21    Name: Nora Zamorano MRN: [id number]    : 1970     Program: Adult Day Treatment Program (ADT)    Clinical Track: 1B    DSM5 Diagnosis:   296.32 (F33.1) Major Depressive Disorder, Recurrent Episode, Moderate; 300.02 (F41.1) Generalized Anxiety Disorder  309.81 (F43.10) Posttraumatic Stress Disorder      ADT Multidisciplinary Team Members:  Dr. Roberto Menjivar MD,   Lindy Elizondo, [facility], LADC, Turner Devi OTR/L,  Dary Rondon RN  Nora Zamorano will participate in the Adult Day Treatment Program 3 days per week, 3 hours per day.   Anticipated duration/discharge: 12 weeks    Due to COVID-19, services will be delivered via telemedicine until further notice.     Program Start Date: 21  Anticipated Discharge Date: 22 (pending authorization/clinical changes)    NOTE: Complete CGI     Review Date: Does Nora Zamorano continue to meet criteria to participate in the ADT Program, 3 days per week; 3 hours per day?   21 YES  Turner Devi OTR/L   21 Staff Meeting Joana Menjivar MD   22 (60 Days) Taras Devi OTR/L on 2022 at 12:51 PM   21 Yes Mary Boyd, LAYO,  Licensed Clinical Psychologist   2022 staff meeting Joana Menjivar MD on 2022 at 8:16 AM   2/10/2022 staff meeting Joana Menjivar MD on 2/10/2022 at 8:29 AM   22 No         Client Strengths:  committed to sobriety, creative, educated, has a previous history of therapy, insightful, intelligent, motivated, open to learning, willing to relate to others and work history    Client Participation in Plan:  Contributed to goals and plan     Areas of Vulnerability:  Anxiety  Depressive symptoms   Trauma/Abuse/Neglect    Long-Term Goals:  Knowledge about illness and management of symptoms   Maintenance of personal safety     Abuse Prevention Plan:  Safe, therapeutic  "environment   Safety coping plan as needed   Education regarding illness and skill development   Coordination with care providers     Discharge Criteria:  Satisfactory progress toward treatment goals   Improvement re: identified problems and symptoms   Ability to continue recovery at next level of service   Has a discharge plan in place   Has safety/coping plan in place      Areas of Treatment Focus     Why are you seeking treatment/What do you want to focus on during treatment? \"Depression mostly, with some anxiety. I sleep well from medication but have a lot of trouble during daytime. I am good in groups with giving and receiving support.\" The problem(s) began \"05/29/21.\"  DA 12/1/21       Area of Treatment Focus:   Personal Safety  Start Date:    12/13/21    Goal:  Target Date: 2//7/22 Status: Stopped  Patti will notify staff when needing assistance to develop or implement a coping plan to manage suicidal or self injurious urges.Use coping plan for safety, as needed.      Progress:   2/9 \"I am really good\"      2/18/22: Nora Irving has not reported any recent safety concerns.  STOPPED    Treatment Strategies:   Assess / reassess level of potential for harm to self or others  Engage in safety planning when indicated  Facilitate increased self awareness          Area of Treatment Focus:   Symptom Stabilization and Management  Start Date:    12/13/21    Goal:  Target Date: 2/7/22 Status: Stopped  Nora Irving will report on symptoms and identify skills to use to manage depression,anxiety and trauma.      Progress:   2/9 Patient reported learning grounding coping skills from another group member  For example running cold water over her wrists which helps her to to get out of her own head.    2/18/22: Nora Irving continued to work on practicing grounding skills and did a good job of using them when she was triggered.  STOPPED        Treatment Strategies:   Assess / reassess level of potential for harm to self or others  Engage in " "safety planning when indicated  Facilitate increased self awareness  Teach adaptive coping skills and communication skills          Area of Treatment Focus:   Wellness and Mental Health Recovery  Start Date:    12/13/21    Goal:  Target Date: 2/7/22 Status: Stopped  Nora Irving will improve wellness related behaviors by getting adequate exercise and stress relief to maintain good mental health.      Progress:   2/9 Exercise- \"zero\". Has had a free [facility] years but has only gone a few times. Expressed desire to keep trying. Stress- learned the concept of accountability from another group member which has helped to reduce stress by just showing up. In general she describes herself as an avoidant person which has hindered her involvement in many daily activities.       2/18/22: Nora Irving continued to struggle with lack of activity.  STOPPED    Treatment Strategies:   Assess / reassess level of potential for harm to self or others  Engage in safety planning when indicated  Facilitate increased self awareness  Teach adaptive coping skills and communication skills          Area of Treatment Focus:   Community Resources / Support and Discharge Planning  Start Date:    12/13/21    Goal:  Target Date: 2/7/22 Status: Stopped  Nora Irving will establish an aftercare plan to include medical providers and social supports by discharge.      Progress:   2/9 Patient reports that this best group she has ever been in has many learned many things from younger people in group which was an unexpected surprise. Reports interest in being in the aftercare clinic which become part of her discharge plan.      2/18/22: Nora Irving chose to self-discharge prior to completing the program but has individual providers she has been working with.  STOPPED    Treatment Strategies:   Assist clients in establishing / strengthening support network  Assist with discharge planning  Facilitate increased self awareness       Lindy Elizondo, [facility], " "[facility]  Taras Devi, OTR/L      NOTE: Required signatures are completed manually and scanned into the electronic medical record. See \"Media\" tab in epic.    The Individualized Treatment Plan Signature Page has been routed to the provider for co-sign.    I have reviewed the patient's Individualized Treatment Plan and agree with the current goals, interventions and level of care.     Roberto Menjivar MD  12/9/2021, 1/13/2022, 2/10/2022       12/7/21, 12/29/21  Mary Boyd, LAYO,  Licensed Clinical Psychologist        "

## 2021-12-08 NOTE — DISCHARGE SUMMARY
"       Adult Mental Health Intensive Outpatient Discharge Summary/Instructions      Patient: Nora Zamorano MRN: [id number]   : 1970 Age: 51     Admission Date: 21  Discharge Date:  22  Diagnosis: 296.32 (F33.1) Major Depressive Disorder, Recurrent Episode, Moderate; 300.02 (F41.1) Generalized Anxiety Disorder  309.81 (F43.10) Posttraumatic Stress Disorder      Focus of Treatment / Progress    Personal Safety:      * Follow your safety plan     * Call crisis lines as needed:    [facility] [phone] [facility] [phone]  [facility] [phone] Crisis Connection [phone]  [facility] [phone] [facility] [phone]  [facility] [phone] National Suicide Prevention [phone]  [facility] [phone] Suicide Prevention [phone]  [facility] [phone]    Managing symptoms of:  Depression and PTSD    Community support/health:  [place] [address] ([phone]) [email or web address], [place] Crisis text line( Text [place] to 525862),  [facility]  [place], [place] ([phone])  [facility] [place], [place] ( [phone])  [facility] [address] [place] [address], [phone]    Managing Symptoms and Preventing Relapse    * Go to all of your appointments    * Take all medications as directed.      * Carry a current list if medication with you    * Do not use illicit (street) drugs.  Avoid alcohol    * Report these symptoms to your care team. These are early signs of relapse:   Thoughts of suicide   Losing more sleep   Increased confusion   Mood getting worse   Feeling more aggressive   Other:  Substance use    *Use these skills daily:  Talk to someone you trust at least one time weekly, set boundaries and say \"no\", be assertive, act opposite of negative feelings, accept challenges with a positive attitude, exercise at least three times per week for 30 minutes,  get " enough sleep, eat healthy foods, get into a good routine    Copy of summary sent to: In Epic My Chart    Follow up with psychiatrist / main caregiver: Dr. Contreras    Next visit: Unknown    Follow up with your therapist: Jeff Polk   Next visit: Unknown    Go to group therapy and / or support groups at: [facility] and [facility] [place]([facility]) support groups,[facility]    See your medical doctor about:  For an annual physical exam or any general wellness or illness as needed.    Other:  Your treatment team appreciates having the opportunity to work with you and wishes you the best.    Client Signature:__unavailable to sign due to COVID-19_____________________  Date / Time:___________  Staff Signature:__Lindy Elizondo [facility] on 2/18/2022 at 12:54 PM  ______________________   Date / Time:___________

## 2021-12-08 NOTE — GROUP NOTE
"Process Group Note    PATIENT'S NAME: Nora Zamorano  MRN:   [id number]  :   1970  ACCT. NUMBER: [id number]  DATE OF SERVICE: 21  START TIME:  1:00 PM  END TIME:  1:50 PM  FACILITATOR: Lindy Elizondo [facility]  TOPIC:  Process Group    Diagnoses:  309.81 (F43.10) Posttraumatic Stress Disorder    4. Other Diagnoses that is relevant to services:   296.32 (F33.1) Major Depressive Disorder, Recurrent Episode, Moderate; 300.02 (F41.1) Generalized Anxiety Disorder      [facility] Mental Health Day Treatment  TRACK: 1B    NUMBER OF PARTICIPANTS: 6                                      Service Modality:  Video Visit     Telemedicine Visit: The patient's condition can be safely assessed and treated via synchronous audio and visual telemedicine encounter.      Reason for Telemedicine Visit: Services only offered telehealth    Originating Site (Patient Location): Patient's home    Distant Site (Provider Location): Provider Remote Setting- Home Office    Consent:  The patient/guardian has verbally consented to: the potential risks and benefits of telemedicine (video visit) versus in person care; bill my insurance or make self-payment for services provided; and responsibility for payment of non-covered services.     Patient would like the video invitation sent by:  My Chart    Mode of Communication:  Video Conference via Medical Zoom    As the provider I attest to compliance with applicable laws and regulations related to telemedicine.                Data:    Session content: At the start of this group, patients were invited to check in by identifying themselves, describing their current emotional status, and identifying issues to address in this group.   Area(s) of treatment focus addressed in this session included Symptom Management and Personal Safety.    Luisa reported feeling \"anxious\" and \"under pressure\" today.  Her goal is to take some paperwork to [employer] by 7pm.  Patient declined additional process " time but contributed to group discussion and provided feedback and support to peers.      Therapeutic Interventions/Treatment Strategies:  Psychotherapist offered support, feedback and validation and reinforced use of skills.    Assessment:    Patient response:   Patient responded to session by accepting feedback, giving feedback and listening    Possible barriers to participation / learning include: and no barriers identified    Health Issues:   None reported       Substance Use Review:   Substance Use: No active concerns identified.    Mental Status/Behavioral Observations  Appearance:   Appropriate   Eye Contact:   Good   Psychomotor Behavior: Normal   Attitude:   Cooperative   Orientation:   All  Speech   Rate / Production: Normal    Volume:  Normal   Mood:    Anxious  Depressed   Affect:    Appropriate   Thought Content:   Clear  Thought Form:  Coherent  Logical     Insight:    Good     Plan:     Safety Plan: No current safety concerns identified.  Recommended that patient call 911 or go to the local ED should there be a change in any of these risk factors.     Barriers to treatment: None identified    Patient Contracts (see media tab):  None    Substance Use: Not addressed in session     Continue or Discharge: Patient will continue in Adult Day Treatment (ADT)  as planned. Patient is likely to benefit from learning and using skills as they work toward the goals identified in their treatment plan.      Lindy Elizondo, [facility]  December 8, 2021

## 2021-12-08 NOTE — GROUP NOTE
Psychoeducation Group Note    PATIENT'S NAME: Nora Zamorano  MRN:   [id number]  :   1970  ACCT. NUMBER: [id number]  DATE OF SERVICE: 21  START TIME:  3:00 PM  END TIME:  3:50 PM  FACILITATOR: Dary Rondon RN  TOPIC: MH RN Group: Mind/Body Practice & Complementary                                    Service Modality:  Video Visit     Telemedicine Visit: The patient's condition can be safely assessed and treated via synchronous audio and visual telemedicine encounter.      Reason for Telemedicine Visit:  covid19    Originating Site (Patient Location): Patient's home    Distant Site (Provider Location): Provider Remote Setting- Home Office    Consent:  The patient/guardian has verbally consented to: the potential risks and benefits of telemedicine (video visit) versus in person care; bill my insurance or make self-payment for services provided; and responsibility for payment of non-covered services.     Patient would like the video invitation sent by:  My Chart    Mode of Communication:  Video Conference via Medical Zoom    As the provider I attest to compliance with applicable laws and regulations related to telemedicine.          [facility] Mental Health Day Treatment  TRACK: 1B    NUMBER OF PARTICIPANTS: 6    Summary of Group / Topics Discussed:  Mind/Body Practice & Complementary Therapies:  Progressive Muscle Relaxation: In addition to affecting our mood and behavior, psychological stress can cause a myriad of physical symptoms in our body. Patients were educated on these effects and guided to increased self-awareness of how stress affects their body. The purpose, benefits, history, and techniques of progressive muscle relaxation were discussed. In an instructor guided experiential, patients were guided to practice PMR to help reduce physical symptoms of psychological stress and achieve a more balanced feeling of well-being.    Patient Session Goals / Objectives:  ? Identified  physiological symptoms of stress on the body  ? Listed & Explained the purpose and benefits to practicing PMR   ? Practiced progressive muscle relaxation experiential      Patient Participation / Response:  Fully participated with the group by sharing personal reflections / insights and openly received / provided feedback with other participants.    Demonstrated understanding of topics discussed through group discussion and participation and Identified / Expressed personal readiness to practice skills    Treatment Plan:  Patient has an initial individualized treatment plan that was created as part of their diagnostic assessment / admission process.  A master individualized treatment plan is in the process of being developed with the patient and multi-disciplinary care team.    Dary Rondon RN

## 2021-12-08 NOTE — GROUP NOTE
Psychoeducation Group Note    PATIENT'S NAME: Nora Zamorano  MRN:   [id number]  :   1970  ACCT. NUMBER: [id number]  DATE OF SERVICE: 21  START TIME:  2:00 PM  END TIME:  2:50 PM  FACILITATOR: Taras Devi OTR/L  TOPIC: MH Life Skills Group: Resiliency Development                                    Service Modality:  Video Visit     Telemedicine Visit: The patient's condition can be safely assessed and treated via synchronous audio and visual telemedicine encounter.      Reason for Telemedicine Visit: Services only offered telehealth    Originating Site (Patient Location): Patient's home    Distant Site (Provider Location): Provider Remote Setting- Home Office    Consent:  The patient/guardian has verbally consented to: the potential risks and benefits of telemedicine (video visit) versus in person care; bill my insurance or make self-payment for services provided; and responsibility for payment of non-covered services.     Mode of Communication:  Video Conference via Medical Zoom    As the provider I attest to compliance with applicable laws and regulations related to telemedicine.       [facility] Adult Mental Health Day Treatment  TRACK: 1B    NUMBER OF PARTICIPANTS: 7    Summary of Group / Topics Discussed:  Resiliency Development:  Coping Skills(Values and Motivation): Patients were taught how to identify stressors, signs of stress, coping skills, and prevention strategies for overall stress management.  Patients were given the opportunity to identify both ongoing and acute mental health symptoms and how to effectively manage these symptoms by developing an effective aftercare plan.  Patients increased awareness of community based resources.    Patient Session Goals / Objectives:    Identified how using coping skills can be used for symptom and stress management       Improved awareness of individualed symptoms and stressors and how to effectively cope     Established a relapse  prevention plan to practice these skills in their own environments    Practiced and reflected on how to generalize taught skills to their everyday life        Patient Participation / Response:  Fully participated with the group by sharing personal reflections / insights and openly received / provided feedback with other participants.    Demonstrated understanding of content through handouts/discussion , Verbalized understanding of content and Patient would benefit from additional opportunities to practice the content to be able to generalize it to their everyday life with increased intentionality, consistency, and efficacy in support of their psychiatric recovery    Treatment Plan:  Patient has a current master individualized treatment plan.  See Epic treatment plan for more information.    Taras Devi, OTR/L

## 2021-12-09 ENCOUNTER — HOSPITAL ENCOUNTER (OUTPATIENT)
Dept: BEHAVIORAL HEALTH | Facility: CLINIC | Age: 51
End: 2021-12-09
Attending: PSYCHIATRY & NEUROLOGY
Payer: MEDICARE

## 2021-12-09 PROCEDURE — G0177 OPPS/PHP; TRAIN & EDUC SERV: HCPCS | Mod: PO

## 2021-12-09 PROCEDURE — 90853 GROUP PSYCHOTHERAPY: CPT | Mod: PO | Performed by: COUNSELOR

## 2021-12-09 NOTE — GROUP NOTE
Psychotherapy Group Note    PATIENT'S NAME: Nora Zamorano  MRN:   [id number]  :   1970  ACCT. NUMBER: [id number]  DATE OF SERVICE: 21  START TIME:  1:00 PM  END TIME:  1:50 PM  FACILITATOR: Lindy Elizondo LPC  TOPIC: MH EBP Group: Cognitive Restructuring  [facility] Adult Mental Health Day Treatment  TRACK: 1B    NUMBER OF PARTICIPANTS: 3                                      Service Modality:  Video Visit     Telemedicine Visit: The patient's condition can be safely assessed and treated via synchronous audio and visual telemedicine encounter.      Reason for Telemedicine Visit: Services only offered telehealth    Originating Site (Patient Location): Patient's home    Distant Site (Provider Location): Provider Remote Setting- Home Office    Consent:  The patient/guardian has verbally consented to: the potential risks and benefits of telemedicine (video visit) versus in person care; bill my insurance or make self-payment for services provided; and responsibility for payment of non-covered services.     Patient would like the video invitation sent by:  My Chart    Mode of Communication:  Video Conference via Medical Zoom    As the provider I attest to compliance with applicable laws and regulations related to telemedicine.          Summary of Group / Topics Discussed:  Cognitive Restructuring: Core Beliefs: Patients received an overview of what a core belief is, and how they develop. Patients then began to identify their negative core beliefs. Patients worked to modify core beliefs with the goal of improved self-image and functioning.     Patient Session Goals / Objectives:    Familiarize self with the concept of core beliefs and how they develop.      Explore personal core beliefs (positive and negative)    Develop / advance recognition of the connection between negative thoughts and negative core beliefs.    Formulate new neutral/positive core beliefs               Patient Participation /  Response:  Fully participated with the group by sharing personal reflections / insights and openly received / provided feedback with other participants.    Demonstrated understanding of topics discussed through group discussion and participation, Expressed understanding of the relationship between behaviors, thoughts, and feelings and Demonstrated knowledge of personal thought patterns and how they impact their mood and behavior.    Treatment Plan:  Patient has a current master individualized treatment plan.  See Epic treatment plan for more information.    Lindy Elizondo, [facility]

## 2021-12-09 NOTE — GROUP NOTE
"Process Group Note    PATIENT'S NAME: Nora Zamorano  MRN:   [id number]  :   1970  ACCT. NUMBER: [id number]  DATE OF SERVICE: 21  START TIME:  1:00 PM  END TIME:  1:50 PM  FACILITATOR: Lindy Elizondo [facility]  TOPIC:  Process Group    Diagnoses:  309.81 (F43.10) Posttraumatic Stress Disorder    4. Other Diagnoses that is relevant to services:   296.32 (F33.1) Major Depressive Disorder, Recurrent Episode, Moderate; 300.02 (F41.1) Generalized Anxiety Disorder      [facility] Mental Health Day Treatment  TRACK: 1B    NUMBER OF PARTICIPANTS: 4                                      Service Modality:  Video Visit     Telemedicine Visit: The patient's condition can be safely assessed and treated via synchronous audio and visual telemedicine encounter.      Reason for Telemedicine Visit: Services only offered telehealth    Originating Site (Patient Location): Patient's home    Distant Site (Provider Location): Provider Remote Setting- Home Office    Consent:  The patient/guardian has verbally consented to: the potential risks and benefits of telemedicine (video visit) versus in person care; bill my insurance or make self-payment for services provided; and responsibility for payment of non-covered services.     Patient would like the video invitation sent by:  My Chart    Mode of Communication:  Video Conference via Medical Zoom    As the provider I attest to compliance with applicable laws and regulations related to telemedicine.                Data:    Session content: At the start of this group, patients were invited to check in by identifying themselves, describing their current emotional status, and identifying issues to address in this group.   Area(s) of treatment focus addressed in this session included Symptom Management, Personal Safety and Abstinence/Relapse Prevention.    Luisa reported feeling \"hopeful\" and \"motivated\" today.  Her goal is to finish the paperwork she wanted to get done " yesterday.     Therapeutic Interventions/Treatment Strategies:  Psychotherapist offered support, feedback and validation and reinforced use of skills.    Assessment:    Patient response:   Patient responded to session by accepting feedback, giving feedback and listening    Possible barriers to participation / learning include: and no barriers identified    Health Issues:   None reported       Substance Use Review:   Substance Use: No active concerns identified.    Mental Status/Behavioral Observations  Appearance:   Appropriate   Eye Contact:   Good   Psychomotor Behavior: Normal   Attitude:   Cooperative   Orientation:   All  Speech   Rate / Production: Normal    Volume:  Normal   Mood:    Normal  Affect:    Appropriate   Thought Content:   Clear  Thought Form:  Coherent  Logical     Insight:    Good     Plan:     Safety Plan: No current safety concerns identified.  Recommended that patient call 911 or go to the local ED should there be a change in any of these risk factors.     Barriers to treatment: None identified    Patient Contracts (see media tab):  None    Substance Use: Not addressed in session     Continue or Discharge: Patient will continue in Adult Day Treatment (ADT)  as planned. Patient is likely to benefit from learning and using skills as they work toward the goals identified in their treatment plan.      Lindy Elizondo, [facility]  December 9, 2021

## 2021-12-13 ENCOUNTER — HOSPITAL ENCOUNTER (OUTPATIENT)
Dept: BEHAVIORAL HEALTH | Facility: CLINIC | Age: 51
End: 2021-12-13
Attending: PSYCHIATRY & NEUROLOGY
Payer: MEDICARE

## 2021-12-13 PROCEDURE — 90853 GROUP PSYCHOTHERAPY: CPT | Mod: GT | Performed by: COUNSELOR

## 2021-12-13 PROCEDURE — G0177 OPPS/PHP; TRAIN & EDUC SERV: HCPCS | Mod: PO

## 2021-12-13 ASSESSMENT — ANXIETY QUESTIONNAIRES
7. FEELING AFRAID AS IF SOMETHING AWFUL MIGHT HAPPEN: SEVERAL DAYS
GAD7 TOTAL SCORE: 13
7. FEELING AFRAID AS IF SOMETHING AWFUL MIGHT HAPPEN: SEVERAL DAYS
6. BECOMING EASILY ANNOYED OR IRRITABLE: MORE THAN HALF THE DAYS
2. NOT BEING ABLE TO STOP OR CONTROL WORRYING: NEARLY EVERY DAY
GAD7 TOTAL SCORE: 13
3. WORRYING TOO MUCH ABOUT DIFFERENT THINGS: NEARLY EVERY DAY
4. TROUBLE RELAXING: SEVERAL DAYS
6. BECOMING EASILY ANNOYED OR IRRITABLE: MORE THAN HALF THE DAYS
GAD7 TOTAL SCORE: 13
GAD7 TOTAL SCORE: 13
5. BEING SO RESTLESS THAT IT IS HARD TO SIT STILL: NOT AT ALL
5. BEING SO RESTLESS THAT IT IS HARD TO SIT STILL: NOT AT ALL
3. WORRYING TOO MUCH ABOUT DIFFERENT THINGS: NEARLY EVERY DAY
3. WORRYING TOO MUCH ABOUT DIFFERENT THINGS: NEARLY EVERY DAY
4. TROUBLE RELAXING: SEVERAL DAYS
1. FEELING NERVOUS, ANXIOUS, OR ON EDGE: NEARLY EVERY DAY
1. FEELING NERVOUS, ANXIOUS, OR ON EDGE: NEARLY EVERY DAY
GAD7 TOTAL SCORE: 13
1. FEELING NERVOUS, ANXIOUS, OR ON EDGE: NEARLY EVERY DAY
7. FEELING AFRAID AS IF SOMETHING AWFUL MIGHT HAPPEN: SEVERAL DAYS
GAD7 TOTAL SCORE: 13
7. FEELING AFRAID AS IF SOMETHING AWFUL MIGHT HAPPEN: SEVERAL DAYS
GAD7 TOTAL SCORE: 13
GAD7 TOTAL SCORE: 13
2. NOT BEING ABLE TO STOP OR CONTROL WORRYING: NEARLY EVERY DAY
4. TROUBLE RELAXING: SEVERAL DAYS
5. BEING SO RESTLESS THAT IT IS HARD TO SIT STILL: NOT AT ALL
7. FEELING AFRAID AS IF SOMETHING AWFUL MIGHT HAPPEN: SEVERAL DAYS
2. NOT BEING ABLE TO STOP OR CONTROL WORRYING: NEARLY EVERY DAY
7. FEELING AFRAID AS IF SOMETHING AWFUL MIGHT HAPPEN: SEVERAL DAYS
6. BECOMING EASILY ANNOYED OR IRRITABLE: MORE THAN HALF THE DAYS
GAD7 TOTAL SCORE: 13

## 2021-12-13 NOTE — GROUP NOTE
Psychoeducation Group Note    PATIENT'S NAME: Nora Zamorano  MRN:   [id number]  :   1970  ACCT. NUMBER: [id number]  DATE OF SERVICE: 21  START TIME:  3:00 PM  END TIME:  3:50 PM  FACILITATOR: Dary Rondon, RN  TOPIC: MH RN Group: Medication Education and Management                                    Service Modality:  Video Visit     Telemedicine Visit: The patient's condition can be safely assessed and treated via synchronous audio and visual telemedicine encounter.      Reason for Telemedicine Visit:  covid19    Originating Site (Patient Location): Patient's home    Distant Site (Provider Location): Provider Remote Setting- Home Office    Consent:  The patient/guardian has verbally consented to: the potential risks and benefits of telemedicine (video visit) versus in person care; bill my insurance or make self-payment for services provided; and responsibility for payment of non-covered services.     Patient would like the video invitation sent by:  My Chart    Mode of Communication:  Video Conference via Medical Zoom    As the provider I attest to compliance with applicable laws and regulations related to telemedicine.          [facility] Mental Health Day Treatment  TRACK: 1B    NUMBER OF PARTICIPANTS: 5    Summary of Group / Topics Discussed:  Medication Educations and Management:  Medication Jeopardy: Patients provided education regarding medication safety, antidepressants, side effects, neuroleptics, expected medication outcomes, knowledge of diagnosis, symptoms, and symptom management through an engaging jeopardy-style format.     Patient Session Goals / Objectives:    ? Participated in team-based Jeopardy game  ? Identified strategies for safe use, handling, and disposal of medications  ? Discussed basic aspects of medication safety, side effects, adverse outcomes and contraindications      Patient Participation / Response:  Fully participated with the group by sharing  personal reflections / insights and openly received / provided feedback with other participants.     Demonstrated understanding of topics discussed through group discussion and participation and Identified / Expressed personal readiness to practice skills    Treatment Plan:  Patient has a current master individualized treatment plan.  See Epic treatment plan for more information.    Dary Rondon RN

## 2021-12-13 NOTE — PROGRESS NOTES
Acknowledgement of Current Treatment Plan       I have reviewed my treatment plan with my therapist on 12/13/21.   I agree with the plan as it is written in the electronic health record. (1B)    Name:      Signature:  Nora Zamorano Unable to sign due to COVID and Virtual     Roberto Menjivar MD  Psychiatrist/Medical Director Roberto Menjivar MD on 12/17/2021 at 1:30 PM   PEÑA White, [facility]  Psychotherapist PEÑA Valladares on 12/17/2021 at 11:29 AM     Turner Devi OTR/L Turner Devi OTR/L

## 2021-12-13 NOTE — GROUP NOTE
"Process Group Note    PATIENT'S NAME: Nora Zamorano  MRN:   [id number]  :   1970  ACCT. NUMBER: [id number]  DATE OF SERVICE: 21  START TIME:  1:00 PM  END TIME:  1:50 PM  FACILITATOR: Lindy Elizondo [facility]  TOPIC:  Process Group    Diagnoses:  309.81 (F43.10) Posttraumatic Stress Disorder    4. Other Diagnoses that is relevant to services:   296.32 (F33.1) Major Depressive Disorder, Recurrent Episode, Moderate; 300.02 (F41.1) Generalized Anxiety Disorder      [facility] Mental Health Day Treatment  TRACK: 1B    NUMBER OF PARTICIPANTS: 5                                      Service Modality:  Video Visit     Telemedicine Visit: The patient's condition can be safely assessed and treated via synchronous audio and visual telemedicine encounter.      Reason for Telemedicine Visit: Services only offered telehealth    Originating Site (Patient Location): Patient's home    Distant Site (Provider Location): Provider Remote Setting- Home Office    Consent:  The patient/guardian has verbally consented to: the potential risks and benefits of telemedicine (video visit) versus in person care; bill my insurance or make self-payment for services provided; and responsibility for payment of non-covered services.     Patient would like the video invitation sent by:  My Chart    Mode of Communication:  Video Conference via Medical Zoom    As the provider I attest to compliance with applicable laws and regulations related to telemedicine.                Data:    Session content: At the start of this group, patients were invited to check in by identifying themselves, describing their current emotional status, and identifying issues to address in this group.   Area(s) of treatment focus addressed in this session included Symptom Management and Personal Safety.    Dominic reported feeling \"pretty good\" today.  Her goal is to make travel plans for her partner who has to go out of state to visit a sick relative.  " Patient declined additional process time but contributed to group discussion and provided feedback and support to peers.      Therapeutic Interventions/Treatment Strategies:  Psychotherapist offered support, feedback and validation and reinforced use of skills.    Assessment:    Patient response:   Patient responded to session by accepting feedback, giving feedback and listening    Possible barriers to participation / learning include: and no barriers identified    Health Issues:   None reported       Substance Use Review:   Substance Use: No active concerns identified.    Mental Status/Behavioral Observations  Appearance:   Appropriate   Eye Contact:   Good   Psychomotor Behavior: Normal   Attitude:   Cooperative   Orientation:   All  Speech   Rate / Production: Normal    Volume:  Normal   Mood:    Depressed   Affect:    Appropriate   Thought Content:   Clear  Thought Form:  Coherent  Logical     Insight:    Good     Plan:     Safety Plan: No current safety concerns identified.  Recommended that patient call 911 or go to the local ED should there be a change in any of these risk factors.     Barriers to treatment: None identified    Patient Contracts (see media tab):  None    Substance Use: Not addressed in session     Continue or Discharge: Patient will continue in Adult Day Treatment (ADT)  as planned. Patient is likely to benefit from learning and using skills as they work toward the goals identified in their treatment plan.      Lindy Elizondo, [facility]  December 13, 2021

## 2021-12-13 NOTE — GROUP NOTE
Psychoeducation Group Note    PATIENT'S NAME: Nora Zamorano  MRN:   [id number]  :   1970  ACCT. NUMBER: [id number]  DATE OF SERVICE: 21  START TIME:  2:00 PM  END TIME:  2:50 PM  FACILITATOR: Taras Devi OTR/L  TOPIC: MH Life Skills Group: Resiliency Development                                    Service Modality:  Video Visit     Telemedicine Visit: The patient's condition can be safely assessed and treated via synchronous audio and visual telemedicine encounter.      Reason for Telemedicine Visit: Services only offered telehealth    Originating Site (Patient Location): Patient's home    Distant Site (Provider Location): Provider Remote Setting- Home Office    Consent:  The patient/guardian has verbally consented to: the potential risks and benefits of telemedicine (video visit) versus in person care; bill my insurance or make self-payment for services provided; and responsibility for payment of non-covered services.     Mode of Communication:  Video Conference via Medical Zoom    As the provider I attest to compliance with applicable laws and regulations related to telemedicine.       [facility] Adult Mental Health Day Treatment  TRACK: 1B    NUMBER OF PARTICIPANTS: 6    Summary of Group / Topics Discussed:  Resiliency Development:  Coping Skills( Stage of coping with stress): Patients were taught how to identify stressors, signs of stress, coping skills, and prevention strategies for overall stress management.  Patients were given the opportunity to identify both ongoing and acute mental health symptoms and how to effectively manage these symptoms by developing an effective aftercare plan.  Patients increased awareness of community based resources.    Patient Session Goals / Objectives:    Identified how using coping skills can be used for symptom and stress management       Improved awareness of individualed symptoms and stressors and how to effectively cope     Established a  relapse prevention plan to practice these skills in their own environments    Practiced and reflected on how to generalize taught skills to their everyday life        Patient Participation / Response:  Fully participated with the group by sharing personal reflections / insights and openly received / provided feedback with other participants.    Demonstrated understanding of content through video,handouts.discussion , Verbalized understanding of content and Patient would benefit from additional opportunities to practice the content to be able to generalize it to their everyday life with increased intentionality, consistency, and efficacy in support of their psychiatric recovery    Treatment Plan:  Patient has a current master individualized treatment plan.  See Epic treatment plan for more information.    Taras Devi, OTR/L

## 2021-12-14 ASSESSMENT — ANXIETY QUESTIONNAIRES
GAD7 TOTAL SCORE: 13

## 2021-12-15 ENCOUNTER — HOSPITAL ENCOUNTER (OUTPATIENT)
Dept: BEHAVIORAL HEALTH | Facility: CLINIC | Age: 51
End: 2021-12-15
Attending: PSYCHIATRY & NEUROLOGY
Payer: MEDICARE

## 2021-12-15 PROCEDURE — G0177 OPPS/PHP; TRAIN & EDUC SERV: HCPCS | Mod: GT

## 2021-12-15 PROCEDURE — 90853 GROUP PSYCHOTHERAPY: CPT | Mod: PO | Performed by: COUNSELOR

## 2021-12-15 NOTE — GROUP NOTE
Psychoeducation Group Note    PATIENT'S NAME: Nora Zamorano  MRN:   [id number]  :   1970  ACCT. NUMBER: [id number]  DATE OF SERVICE: 12/15/21  START TIME:  2:00 PM  END TIME:  2:50 PM  FACILITATOR: Taras Devi OTR/L  TOPIC: MH Life Skills Group: Resiliency Development                                    Service Modality:  Video Visit     Telemedicine Visit: The patient's condition can be safely assessed and treated via synchronous audio and visual telemedicine encounter.      Reason for Telemedicine Visit: Services only offered telehealth    Originating Site (Patient Location): Patient's home    Distant Site (Provider Location): Provider Remote Setting- Home Office    Consent:  The patient/guardian has verbally consented to: the potential risks and benefits of telemedicine (video visit) versus in person care; bill my insurance or make self-payment for services provided; and responsibility for payment of non-covered services.     Mode of Communication:  Video Conference via Medical Zoom    As the provider I attest to compliance with applicable laws and regulations related to telemedicine.       [facility] Adult Mental Health Day Treatment  TRACK: 1B    NUMBER OF PARTICIPANTS: 5    Summary of Group / Topics Discussed:  Resiliency Development:  Coping Skills(Self-Awareness and self-confidence): Patients were taught how to identify stressors, signs of stress, coping skills, and prevention strategies for overall stress management.  Patients were given the opportunity to identify both ongoing and acute mental health symptoms and how to effectively manage these symptoms by developing an effective aftercare plan.  Patients increased awareness of community based resources.    Patient Session Goals / Objectives:    Identified how using coping skills can be used for symptom and stress management       Improved awareness of individualed symptoms and stressors and how to effectively cope     Established a  relapse prevention plan to practice these skills in their own environments    Practiced and reflected on how to generalize taught skills to their everyday life        Patient Participation / Response:  Fully participated with the group by sharing personal reflections / insights and openly received / provided feedback with other participants.    Demonstrated understanding of content through handouts/group discussion , Verbalized understanding of content and Patient would benefit from additional opportunities to practice the content to be able to generalize it to their everyday life with increased intentionality, consistency, and efficacy in support of their psychiatric recovery    Treatment Plan:  Patient has a current master individualized treatment plan.  See Epic treatment plan for more information.    Taras Devi, OTR/L

## 2021-12-15 NOTE — GROUP NOTE
Psychoeducation Group Note    PATIENT'S NAME: Nora Zamorano  MRN:   [id number]  :   1970  ACCT. NUMBER: [id number]  DATE OF SERVICE: 12/15/21  START TIME:  3:00 PM  END TIME:  3:50 PM  FACILITATOR: Dary Rondon, RN  TOPIC: MH RN Group: Medication Education and Management                                    Service Modality:  Video Visit     Telemedicine Visit: The patient's condition can be safely assessed and treated via synchronous audio and visual telemedicine encounter.      Reason for Telemedicine Visit:  covid19    Originating Site (Patient Location): Patient's home    Distant Site (Provider Location): Provider Remote Setting- Home Office    Consent:  The patient/guardian has verbally consented to: the potential risks and benefits of telemedicine (video visit) versus in person care; bill my insurance or make self-payment for services provided; and responsibility for payment of non-covered services.     Patient would like the video invitation sent by:  My Chart    Mode of Communication:  Video Conference via Medical Zoom    As the provider I attest to compliance with applicable laws and regulations related to telemedicine.          [facility] Mental Health Day Treatment  TRACK: 1B    NUMBER OF PARTICIPANTS: 5    Summary of Group / Topics Discussed:  Medication Educations and Management:  Medication Jeopardy: Patients provided education regarding medication safety, antidepressants, side effects, neuroleptics, expected medication outcomes, knowledge of diagnosis, symptoms, and symptom management through an engaging jeopardy-style format.     Patient Session Goals / Objectives:    ? Participated in team-based Jeopardy game  ? Identified strategies for safe use, handling, and disposal of medications  ? Discussed basic aspects of medication safety, side effects, adverse outcomes and contraindications      Patient Participation / Response:  Fully participated with the group by sharing  personal reflections / insights and openly received / provided feedback with other participants.     Demonstrated understanding of topics discussed through group discussion and participation and Identified / Expressed personal readiness to practice skills    Treatment Plan:  Patient has a current master individualized treatment plan.  See Epic treatment plan for more information.    Dary Rondon RN

## 2021-12-16 ENCOUNTER — HOSPITAL ENCOUNTER (OUTPATIENT)
Dept: BEHAVIORAL HEALTH | Facility: CLINIC | Age: 51
End: 2021-12-16
Attending: PSYCHIATRY & NEUROLOGY
Payer: MEDICARE

## 2021-12-16 PROCEDURE — 90853 GROUP PSYCHOTHERAPY: CPT | Mod: PO | Performed by: COUNSELOR

## 2021-12-16 NOTE — GROUP NOTE
"Process Group Note    PATIENT'S NAME: Nora Zamorano  MRN:   [id number]  :   1970  ACCT. NUMBER: [id number]  DATE OF SERVICE: 21  START TIME:  1:00 PM  END TIME:  1:50 PM  FACILITATOR: Lindy Elizondo [facility]  TOPIC:  Process Group    Diagnoses:  309.81 (F43.10) Posttraumatic Stress Disorder    4. Other Diagnoses that is relevant to services:   296.32 (F33.1) Major Depressive Disorder, Recurrent Episode, Moderate; 300.02 (F41.1) Generalized Anxiety Disorder      [facility] Mental Health Day Treatment  TRACK: 1B    NUMBER OF PARTICIPANTS: 6                                      Service Modality:  Video Visit     Telemedicine Visit: The patient's condition can be safely assessed and treated via synchronous audio and visual telemedicine encounter.      Reason for Telemedicine Visit: Services only offered telehealth    Originating Site (Patient Location): Patient's home    Distant Site (Provider Location): Provider Remote Setting- Home Office    Consent:  The patient/guardian has verbally consented to: the potential risks and benefits of telemedicine (video visit) versus in person care; bill my insurance or make self-payment for services provided; and responsibility for payment of non-covered services.     Patient would like the video invitation sent by:  My Chart    Mode of Communication:  Video Conference via Medical Zoom    As the provider I attest to compliance with applicable laws and regulations related to telemedicine.                Data:    Session content: At the start of this group, patients were invited to check in by identifying themselves, describing their current emotional status, and identifying issues to address in this group.   Area(s) of treatment focus addressed in this session included Symptom Management, Personal Safety and Abstinence/Relapse Prevention.    Nora Irving reported feeling \"anxious and worried\" today.  Her goal is to call the business office regarding a bill from " [employer].  She also needs to go to the grocery store today.  She reported she has started using medical cannabis to treat her anxiety and has noticed that it has been helping.     Therapeutic Interventions/Treatment Strategies:  Psychotherapist offered support, feedback and validation and reinforced use of skills. Treatment modalities used include Motivational Interviewing, Cognitive Behavioral Therapy and Dialectical Behavioral Therapy. Interventions include Symptoms Management: Promoted understanding of their diagnoses and how it impacts their functioning.    Assessment:    Patient response:   Patient responded to session by accepting feedback, giving feedback and listening    Possible barriers to participation / learning include: and no barriers identified    Health Issues:   None reported       Substance Use Review:   Substance Use: cannabis .     Mental Status/Behavioral Observations  Appearance:   Appropriate   Eye Contact:   Good   Psychomotor Behavior: Normal   Attitude:   Cooperative   Orientation:   All  Speech   Rate / Production: Normal    Volume:  Normal   Mood:    Anxious  Depressed   Affect:    Appropriate   Thought Content:   Clear  Thought Form:  Coherent  Logical     Insight:    Good     Plan:     Safety Plan: No current safety concerns identified.  Recommended that patient call 911 or go to the local ED should there be a change in any of these risk factors.     Barriers to treatment: None identified    Patient Contracts (see media tab):  None    Substance Use: Not addressed in session     Continue or Discharge: Patient will continue in Adult Day Treatment (ADT)  as planned. Patient is likely to benefit from learning and using skills as they work toward the goals identified in their treatment plan.      Lindy Elizondo, [facility]  December 16, 2021

## 2021-12-16 NOTE — GROUP NOTE
Psychoeducation Group Note    PATIENT'S NAME: Nora Zamorano  MRN:   [id number]  :   1970  ACCT. NUMBER: [id number]  DATE OF SERVICE: 21  START TIME:  2:00 PM  END TIME:  2:50 PM  FACILITATOR: Taras Devi OTR/L  TOPIC: MH Life Skills Group: Resiliency Development                                    Service Modality:  Video Visit     Telemedicine Visit: The patient's condition can be safely assessed and treated via synchronous audio and visual telemedicine encounter.      Reason for Telemedicine Visit: Services only offered telehealth    Originating Site (Patient Location): Patient's home    Distant Site (Provider Location): Provider Remote Setting- Home Office    Consent:  The patient/guardian has verbally consented to: the potential risks and benefits of telemedicine (video visit) versus in person care; bill my insurance or make self-payment for services provided; and responsibility for payment of non-covered services.     Mode of Communication:  Video Conference via Medical Zoom    As the provider I attest to compliance with applicable laws and regulations related to telemedicine.       [facility] Adult Mental Health Day Treatment  TRACK: 1B    NUMBER OF PARTICIPANTS: 2 (No billing today)    Summary of Group / Topics Discussed:  Resiliency Development:  Coping Skills(Weekly Mental Health Check-In): Patients were taught how to identify stressors, signs of stress, coping skills, and prevention strategies for overall stress management.  Patients were given the opportunity to identify both ongoing and acute mental health symptoms and how to effectively manage these symptoms by developing an effective aftercare plan.  Patients increased awareness of community based resources.    Patient Session Goals / Objectives:    Identified how using coping skills can be used for symptom and stress management       Improved awareness of individualed symptoms and stressors and how to effectively cope      Established a relapse prevention plan to practice these skills in their own environments    Practiced and reflected on how to generalize taught skills to their everyday life        Patient Participation / Response:  Fully participated with the group by sharing personal reflections / insights and openly received / provided feedback with other participants.    Demonstrated understanding of content through handout/group discussion , Verbalized understanding of content and Patient would benefit from additional opportunities to practice the content to be able to generalize it to their everyday life with increased intentionality, consistency, and efficacy in support of their psychiatric recovery    Treatment Plan:  Patient has a current master individualized treatment plan.  See Epic treatment plan for more information.    Taras Devi, OTR/L

## 2021-12-17 NOTE — ADDENDUM NOTE
Encounter addended by: Lindy Elizondo [facility] on: 12/17/2021 11:29 AM   Actions taken: Flowsheet accepted, Clinical Note Signed

## 2021-12-22 ENCOUNTER — HOSPITAL ENCOUNTER (OUTPATIENT)
Dept: BEHAVIORAL HEALTH | Facility: CLINIC | Age: 51
End: 2021-12-22
Attending: PSYCHIATRY & NEUROLOGY
Payer: MEDICARE

## 2021-12-22 PROCEDURE — G0177 OPPS/PHP; TRAIN & EDUC SERV: HCPCS | Mod: GT

## 2021-12-22 PROCEDURE — 90853 GROUP PSYCHOTHERAPY: CPT | Mod: GT | Performed by: COUNSELOR

## 2021-12-22 NOTE — GROUP NOTE
Psychoeducation Group Note    PATIENT'S NAME: Nora Zamorano  MRN:   [id number]  :   1970  ACCT. NUMBER: [id number]  DATE OF SERVICE: 21  START TIME:  2:00 PM  END TIME:  2:50 PM  FACILITATOR: Taras Devi OTR/L  TOPIC: MH Life Skills Group: Lifestyle Balance and Structure                                    Service Modality:  Video Visit     Telemedicine Visit: The patient's condition can be safely assessed and treated via synchronous audio and visual telemedicine encounter.      Reason for Telemedicine Visit: Services only offered telehealth    Originating Site (Patient Location): Patient's home    Distant Site (Provider Location): Provider Remote Setting- Home Office    Consent:  The patient/guardian has verbally consented to: the potential risks and benefits of telemedicine (video visit) versus in person care; bill my insurance or make self-payment for services provided; and responsibility for payment of non-covered services.     Mode of Communication:  Video Conference via Medical Zoom    As the provider I attest to compliance with applicable laws and regulations related to telemedicine.       [facility] Adult Mental Health Day Treatment  TRACK: 1B    NUMBER OF PARTICIPANTS: 7    Summary of Group / Topics Discussed:  Lifestyle Balance and Strucure:  Benefits of Leisure Values on Mental Health: Patients explored and learned about the benefits and possibilities of leisure activity to create lifestyle balance that supports their mental and physical wellbeing.  Patients were assisted to identify individualized leisure values and interests, recognized the benefits of leisure activity on mental health, and problem solved barriers to leisure engagement and strategies to overcome.  Patient engaged in an experiential leisure activity to gain self-awareness and build milieu social aspects and reflected on the impact the experiential activity had on their mood.       Patient Session Goals /  Objectives:    Increased awareness of the importance of engagement in leisure activities to support lifestyle balance and perceived quality of life    Identified strategies to recognize and challenge barriers to leisure participation     Facilitated exploration of meaningful leisure interests and values    Practiced and reflected on how to generalize taught skills to their everyday life      Patient Participation / Response:  Fully participated with the group by sharing personal reflections / insights and openly received / provided feedback with other participants.    Demonstrated understanding of content through handouts/group discussion , Verbalized understanding of content and Patient would benefit from additional opportunities to practice the content to be able to generalize it to their everyday life with increased intentionality, consistency, and efficacy in support of their psychiatric recovery    Treatment Plan:  Patient has a current master individualized treatment plan.  See Epic treatment plan for more information.    Taras Devi, OTR/L

## 2021-12-22 NOTE — GROUP NOTE
"Process Group Note    PATIENT'S NAME: Nora Zamorano  MRN:   [id number]  :   1970  ACCT. NUMBER: [id number]  DATE OF SERVICE: 21  START TIME:  1:00 PM  END TIME:  1:50 PM  FACILITATOR: Lindy Elizondo [facility]  TOPIC:  Process Group    Diagnoses:  309.81 (F43.10) Posttraumatic Stress Disorder    4. Other Diagnoses that is relevant to services:   296.32 (F33.1) Major Depressive Disorder, Recurrent Episode, Moderate; 300.02 (F41.1) Generalized Anxiety Disorder      [facility] Mental Health Day Treatment  TRACK: 1B    NUMBER OF PARTICIPANTS: 5                                      Service Modality:  Video Visit     Telemedicine Visit: The patient's condition can be safely assessed and treated via synchronous audio and visual telemedicine encounter.      Reason for Telemedicine Visit: Services only offered telehealth    Originating Site (Patient Location): Patient's home    Distant Site (Provider Location): Provider Remote Setting- Home Office    Consent:  The patient/guardian has verbally consented to: the potential risks and benefits of telemedicine (video visit) versus in person care; bill my insurance or make self-payment for services provided; and responsibility for payment of non-covered services.     Patient would like the video invitation sent by:  My Chart    Mode of Communication:  Video Conference via Medical Zoom    As the provider I attest to compliance with applicable laws and regulations related to telemedicine.                Data:    Session content: At the start of this group, patients were invited to check in by identifying themselves, describing their current emotional status, and identifying issues to address in this group.   Area(s) of treatment focus addressed in this session included Symptom Management and Personal Safety.    Luisa reported feeling \"pretty good\" today.  Her goal for the day is to re-arrange the furniture in one of her rooms.  She feels like she has " "\"turned a corner\" and things don't feel as overwhelming anymore.  She identified that being in group and not being isolated has been helpful.  Patient declined additional process time but contributed to group discussion and provided feedback and support to peers.      Therapeutic Interventions/Treatment Strategies:  Psychotherapist offered support, feedback and validation and reinforced use of skills.    Assessment:    Patient response:   Patient responded to session by accepting feedback, giving feedback and listening    Possible barriers to participation / learning include: and no barriers identified    Health Issues:   None reported       Substance Use Review:   Substance Use: No active concerns identified.    Mental Status/Behavioral Observations  Appearance:   Appropriate   Eye Contact:   Good   Psychomotor Behavior: Normal   Attitude:   Cooperative   Orientation:   All  Speech   Rate / Production: Normal    Volume:  Normal   Mood:    Normal  Affect:    Appropriate   Thought Content:   Clear  Thought Form:  Coherent  Logical     Insight:    Good     Plan:     Safety Plan: No current safety concerns identified.  Recommended that patient call 911 or go to the local ED should there be a change in any of these risk factors.     Barriers to treatment: None identified    Patient Contracts (see media tab):  None    Substance Use: Provided encouragement towards sobriety    Provided support and affirmation for steps taken towards sobriety      Continue or Discharge: Patient will continue in Adult Day Treatment (ADT)  as planned. Patient is likely to benefit from learning and using skills as they work toward the goals identified in their treatment plan.      Lindy Elizondo, [facility]  December 22, 2021    "

## 2021-12-22 NOTE — GROUP NOTE
Psychoeducation Group Note    PATIENT'S NAME: Nora Zamorano  MRN:   [id number]  :   1970  ACCT. NUMBER: [id number]  DATE OF SERVICE: 21  START TIME:  3:00 PM  END TIME:  3:50 PM  FACILITATOR: Dary Rondon RN  TOPIC: LAY RN Group: Medication Education and Management                                    Service Modality:  Video Visit     Telemedicine Visit: The patient's condition can be safely assessed and treated via synchronous audio and visual telemedicine encounter.      Reason for Telemedicine Visit:  covid19    Originating Site (Patient Location): Patient's home    Distant Site (Provider Location): Provider Remote Setting- Home Office    Consent:  The patient/guardian has verbally consented to: the potential risks and benefits of telemedicine (video visit) versus in person care; bill my insurance or make self-payment for services provided; and responsibility for payment of non-covered services.     Patient would like the video invitation sent by:  My Chart    Mode of Communication:  Video Conference via Medical Zoom    As the provider I attest to compliance with applicable laws and regulations related to telemedicine.          [facility] Mental Health Day Treatment  TRACK: 1B    NUMBER OF PARTICIPANTS: 7    Summary of Group / Topics Discussed:  Medication Educations and Management:  Medication Categories, pharmacology: Patient were provided with a brief overview of four major psychotropic medication categories. Expected effects, potential side effects, adverse reactions, and contraindications were discussed. Patients learned about how their medications work to treat their illness and reviewed safe medication management, handling, and disposal of medications.     Patient Session Goals / Objectives:  ? Listed the four major categories of psychotropic medications (antidepressants, antipsychotics, mood stabilizers, anti-anxiety)  ? Identified medications in each category and important  adverse reactions/contraindications for use  ? Explained how the medications they take work to treat their illness       Patient Participation / Response:  Fully participated with the group by sharing personal reflections / insights and openly received / provided feedback with other participants.     Demonstrated understanding of topics discussed through group discussion and participation and Identified / Expressed personal readiness to practice skills    Treatment Plan:  Patient has a current master individualized treatment plan.  See Epic treatment plan for more information.    Dayr Rondon RN

## 2021-12-23 ENCOUNTER — HOSPITAL ENCOUNTER (OUTPATIENT)
Dept: BEHAVIORAL HEALTH | Facility: CLINIC | Age: 51
End: 2021-12-23
Attending: PSYCHIATRY & NEUROLOGY
Payer: MEDICARE

## 2021-12-23 PROCEDURE — 90853 GROUP PSYCHOTHERAPY: CPT | Mod: PO | Performed by: COUNSELOR

## 2021-12-23 PROCEDURE — 90853 GROUP PSYCHOTHERAPY: CPT | Mod: GT | Performed by: COUNSELOR

## 2021-12-23 PROCEDURE — G0177 OPPS/PHP; TRAIN & EDUC SERV: HCPCS | Mod: PO

## 2021-12-23 NOTE — GROUP NOTE
"Process Group Note    PATIENT'S NAME: Nora Zamorano  MRN:   [id number]  :   1970  ACCT. NUMBER: [id number]  DATE OF SERVICE: 21  START TIME:  1:00 PM  END TIME:  1:50 PM  FACILITATOR: Lindy Elizondo [facility]  TOPIC:  Process Group    Diagnoses:  309.81 (F43.10) Posttraumatic Stress Disorder    4. Other Diagnoses that is relevant to services:   296.32 (F33.1) Major Depressive Disorder, Recurrent Episode, Moderate; 300.02 (F41.1) Generalized Anxiety Disorder      [facility] Mental Health Day Treatment  TRACK: 1B    NUMBER OF PARTICIPANTS: 4                                      Service Modality:  Video Visit     Telemedicine Visit: The patient's condition can be safely assessed and treated via synchronous audio and visual telemedicine encounter.      Reason for Telemedicine Visit: Services only offered telehealth    Originating Site (Patient Location): Patient's home    Distant Site (Provider Location): Provider Remote Setting- Home Office    Consent:  The patient/guardian has verbally consented to: the potential risks and benefits of telemedicine (video visit) versus in person care; bill my insurance or make self-payment for services provided; and responsibility for payment of non-covered services.     Patient would like the video invitation sent by:  My Chart    Mode of Communication:  Video Conference via Medical Zoom    As the provider I attest to compliance with applicable laws and regulations related to telemedicine.                Data:    Session content: At the start of this group, patients were invited to check in by identifying themselves, describing their current emotional status, and identifying issues to address in this group.   Area(s) of treatment focus addressed in this session included Symptom Management and Personal Safety.    Nora Irving reported feeling \"low energy\" today.  Her goal is to be \"present and oriented\" in group.  Patient declined additional process time but " contributed to group discussion and provided feedback and support to peers.      Therapeutic Interventions/Treatment Strategies:  Psychotherapist offered support, feedback and validation and reinforced use of skills.    Assessment:    Patient response:   Patient responded to session by accepting feedback, giving feedback and listening    Possible barriers to participation / learning include: and no barriers identified    Health Issues:   None reported       Substance Use Review:   Substance Use: No active concerns identified.    Mental Status/Behavioral Observations  Appearance:   Appropriate   Eye Contact:   Good   Psychomotor Behavior: Normal   Attitude:   Cooperative   Orientation:   All  Speech   Rate / Production: Normal    Volume:  Normal   Mood:    Anxious  Depressed   Affect:    Appropriate   Thought Content:   Clear  Thought Form:  Coherent  Logical     Insight:    Good     Plan:     Safety Plan: No current safety concerns identified.  Recommended that patient call 911 or go to the local ED should there be a change in any of these risk factors.     Barriers to treatment: None identified    Patient Contracts (see media tab):  None    Substance Use: Not addressed in session     Continue or Discharge: Patient will continue in Adult Day Treatment (ADT)  as planned. Patient is likely to benefit from learning and using skills as they work toward the goals identified in their treatment plan.      Lindy Elizondo, [facility]  December 23, 2021

## 2021-12-23 NOTE — GROUP NOTE
Psychoeducation Group Note    PATIENT'S NAME: Nora Zamorano  MRN:   [id number]  :   1970  ACCT. NUMBER: [id number]  DATE OF SERVICE: 21  START TIME:  2:00 PM  END TIME:  5:50 PM  FACILITATOR: Taras Devi OTR/L  TOPIC: MH Life Skills Group: Resiliency Development                                    Service Modality:  Video Visit     Telemedicine Visit: The patient's condition can be safely assessed and treated via synchronous audio and visual telemedicine encounter.      Reason for Telemedicine Visit: Services only offered telehealth    Originating Site (Patient Location): Patient's home    Distant Site (Provider Location): Provider Remote Setting- Home Office    Consent:  The patient/guardian has verbally consented to: the potential risks and benefits of telemedicine (video visit) versus in person care; bill my insurance or make self-payment for services provided; and responsibility for payment of non-covered services.     Mode of Communication:  Video Conference via Medical Zoom    As the provider I attest to compliance with applicable laws and regulations related to telemedicine.       [facility] Adult Mental Health Day Treatment  TRACK: 1B    NUMBER OF PARTICIPANTS: 6    Summary of Group / Topics Discussed:  Resiliency Development:  Coping Skills(Strategies to mange anxiety and stress): Patients were taught how to identify stressors, signs of stress, coping skills, and prevention strategies for overall stress management.  Patients were given the opportunity to identify both ongoing and acute mental health symptoms and how to effectively manage these symptoms by developing an effective aftercare plan.  Patients increased awareness of community based resources.    Patient Session Goals / Objectives:    Identified how using coping skills can be used for symptom and stress management       Improved awareness of individualed symptoms and stressors and how to effectively cope      Established a relapse prevention plan to practice these skills in their own environments    Practiced and reflected on how to generalize taught skills to their everyday life        Patient Participation / Response:  Fully participated with the group by sharing personal reflections / insights and openly received / provided feedback with other participants.    Demonstrated understanding of content through video/group discussion , Verbalized understanding of content and Patient would benefit from additional opportunities to practice the content to be able to generalize it to their everyday life with increased intentionality, consistency, and efficacy in support of their psychiatric recovery    Treatment Plan:  Patient has a current master individualized treatment plan.  See Epic treatment plan for more information.    Taras Devi, OTR/L

## 2021-12-23 NOTE — GROUP NOTE
Psychotherapy Group Note    PATIENT'S NAME: Nora Zamorano  MRN:   [id number]  :   1970  ACCT. NUMBER: [id number]  DATE OF SERVICE: 21  START TIME:  3:00 PM  END TIME:  3:50 PM  FACILITATOR: Lindy Elizondo [facility]  TOPIC: MH EBP Group: Coping Skills  [facility] Adult Mental Health Day Treatment  TRACK: 1B    NUMBER OF PARTICIPANTS: 6                                      Service Modality:  Video Visit     Telemedicine Visit: The patient's condition can be safely assessed and treated via synchronous audio and visual telemedicine encounter.      Reason for Telemedicine Visit: Services only offered telehealth    Originating Site (Patient Location): Patient's home    Distant Site (Provider Location): Provider Remote Setting- Home Office    Consent:  The patient/guardian has verbally consented to: the potential risks and benefits of telemedicine (video visit) versus in person care; bill my insurance or make self-payment for services provided; and responsibility for payment of non-covered services.     Patient would like the video invitation sent by:  My Chart    Mode of Communication:  Video Conference via Medical Zoom    As the provider I attest to compliance with applicable laws and regulations related to telemedicine.          Summary of Group / Topics Discussed:  Coping Skills: Self-Soothe: Patients learned to apply self-soothe as a way to decrease heightened stress in the moment.  Patients identified situations that necessitate self-soothe strategies.  They focused on ways to manage physical symptoms of distress using the senses. They discussed how to distinguish when this can be useful in their lives when other strategies are more relevant or helpful.    Patient Session Goals / Objectives:    Understand the purpose of using the senses to decrease distress    Process what happens in the body when using self-soothe strategies    Demonstrate understanding of when to use self-soothe  strategies    Identify and problem solve barriers to applying self-soothe strategies.    Choose 1-2 self-soothe strategies to apply during times of distress.        Patient Participation / Response:  Fully participated with the group by sharing personal reflections / insights and openly received / provided feedback with other participants.    Demonstrated understanding of topics discussed through group discussion and participation, Expressed understanding of the relevance / importance of coping skills at distressing times in life and Demonstrated knowledge of when to consider using a variety of coping skills in daily life    Treatment Plan:  Patient has a current master individualized treatment plan.  See Epic treatment plan for more information.    Lindy Elizondo, [facility]

## 2021-12-27 ENCOUNTER — HOSPITAL ENCOUNTER (OUTPATIENT)
Dept: BEHAVIORAL HEALTH | Facility: CLINIC | Age: 51
End: 2021-12-27
Attending: PSYCHIATRY & NEUROLOGY
Payer: MEDICARE

## 2021-12-27 PROCEDURE — 90853 GROUP PSYCHOTHERAPY: CPT | Mod: PO | Performed by: COUNSELOR

## 2021-12-27 NOTE — GROUP NOTE
"Process Group Note    PATIENT'S NAME: Nora Zamorano  MRN:   [id number]  :   1970  ACCT. NUMBER: [id number]  DATE OF SERVICE: 21  START TIME:  1:00 PM  END TIME:  1:50 PM  FACILITATOR: Lindy Elizondo [facility]  TOPIC:  Process Group    Diagnoses:  309.81 (F43.10) Posttraumatic Stress Disorder    4. Other Diagnoses that is relevant to services:   296.32 (F33.1) Major Depressive Disorder, Recurrent Episode, Moderate; 300.02 (F41.1) Generalized Anxiety Disorder      [facility] Mental Health Day Treatment  TRACK: 1B    NUMBER OF PARTICIPANTS: 5                                      Service Modality:  Video Visit     Telemedicine Visit: The patient's condition can be safely assessed and treated via synchronous audio and visual telemedicine encounter.      Reason for Telemedicine Visit: Services only offered telehealth    Originating Site (Patient Location): Patient's home    Distant Site (Provider Location): Provider Remote Setting- Home Office    Consent:  The patient/guardian has verbally consented to: the potential risks and benefits of telemedicine (video visit) versus in person care; bill my insurance or make self-payment for services provided; and responsibility for payment of non-covered services.     Patient would like the video invitation sent by:  My Chart    Mode of Communication:  Video Conference via Medical Zoom    As the provider I attest to compliance with applicable laws and regulations related to telemedicine.                Data:    Session content: At the start of this group, patients were invited to check in by identifying themselves, describing their current emotional status, and identifying issues to address in this group.   Area(s) of treatment focus addressed in this session included Symptom Management and Personal Safety.    Dominic reported feeling \"anxious.\"  Her goal for the day is to attend her therapy appointment.  Patient declined additional process time but " contributed to group discussion and provided feedback and support to peers.      Therapeutic Interventions/Treatment Strategies:  Psychotherapist offered support, feedback and validation and reinforced use of skills.    Assessment:    Patient response:   Patient responded to session by accepting feedback, giving feedback and listening    Possible barriers to participation / learning include: and no barriers identified    Health Issues:   None reported       Substance Use Review:   Substance Use: No active concerns identified.    Mental Status/Behavioral Observations  Appearance:   Appropriate   Eye Contact:   Good   Psychomotor Behavior: Normal   Attitude:   Cooperative   Orientation:   All  Speech   Rate / Production: Normal    Volume:  Normal   Mood:    Anxious   Affect:    Appropriate   Thought Content:   Clear  Thought Form:  Coherent  Logical     Insight:    Good     Plan:     Safety Plan: No current safety concerns identified.  Recommended that patient call 911 or go to the local ED should there be a change in any of these risk factors.     Barriers to treatment: None identified    Patient Contracts (see media tab):  None    Substance Use: Not addressed in session     Continue or Discharge: Patient will continue in Adult Day Treatment (ADT)  as planned. Patient is likely to benefit from learning and using skills as they work toward the goals identified in their treatment plan.      Lindy Elizondo, [facility]  December 27, 2021

## 2021-12-29 ENCOUNTER — HOSPITAL ENCOUNTER (OUTPATIENT)
Dept: BEHAVIORAL HEALTH | Facility: CLINIC | Age: 51
End: 2021-12-29
Attending: PSYCHIATRY & NEUROLOGY
Payer: MEDICARE

## 2021-12-29 PROCEDURE — 90853 GROUP PSYCHOTHERAPY: CPT | Mod: GT | Performed by: COUNSELOR

## 2021-12-29 PROCEDURE — 90832 PSYTX W PT 30 MINUTES: CPT | Mod: 95 | Performed by: PSYCHOLOGIST

## 2021-12-29 PROCEDURE — G0177 OPPS/PHP; TRAIN & EDUC SERV: HCPCS | Mod: GT

## 2021-12-29 PROCEDURE — 90832 PSYTX W PT 30 MINUTES: CPT | Mod: PO | Performed by: PSYCHOLOGIST

## 2021-12-29 ASSESSMENT — ANXIETY QUESTIONNAIRES
1. FEELING NERVOUS, ANXIOUS, OR ON EDGE: NEARLY EVERY DAY
2. NOT BEING ABLE TO STOP OR CONTROL WORRYING: NEARLY EVERY DAY
7. FEELING AFRAID AS IF SOMETHING AWFUL MIGHT HAPPEN: MORE THAN HALF THE DAYS
4. TROUBLE RELAXING: SEVERAL DAYS
GAD7 TOTAL SCORE: 14
5. BEING SO RESTLESS THAT IT IS HARD TO SIT STILL: NOT AT ALL
5. BEING SO RESTLESS THAT IT IS HARD TO SIT STILL: NOT AT ALL
7. FEELING AFRAID AS IF SOMETHING AWFUL MIGHT HAPPEN: MORE THAN HALF THE DAYS
3. WORRYING TOO MUCH ABOUT DIFFERENT THINGS: MORE THAN HALF THE DAYS
2. NOT BEING ABLE TO STOP OR CONTROL WORRYING: NEARLY EVERY DAY
GAD7 TOTAL SCORE: 14
GAD7 TOTAL SCORE: 14
2. NOT BEING ABLE TO STOP OR CONTROL WORRYING: NEARLY EVERY DAY
GAD7 TOTAL SCORE: 14
5. BEING SO RESTLESS THAT IT IS HARD TO SIT STILL: NOT AT ALL
GAD7 TOTAL SCORE: 14
GAD7 TOTAL SCORE: 14
1. FEELING NERVOUS, ANXIOUS, OR ON EDGE: NEARLY EVERY DAY
7. FEELING AFRAID AS IF SOMETHING AWFUL MIGHT HAPPEN: MORE THAN HALF THE DAYS
GAD7 TOTAL SCORE: 14
1. FEELING NERVOUS, ANXIOUS, OR ON EDGE: NEARLY EVERY DAY
6. BECOMING EASILY ANNOYED OR IRRITABLE: NEARLY EVERY DAY
4. TROUBLE RELAXING: SEVERAL DAYS
6. BECOMING EASILY ANNOYED OR IRRITABLE: NEARLY EVERY DAY
5. BEING SO RESTLESS THAT IT IS HARD TO SIT STILL: NOT AT ALL
1. FEELING NERVOUS, ANXIOUS, OR ON EDGE: NEARLY EVERY DAY
3. WORRYING TOO MUCH ABOUT DIFFERENT THINGS: MORE THAN HALF THE DAYS
6. BECOMING EASILY ANNOYED OR IRRITABLE: NEARLY EVERY DAY
4. TROUBLE RELAXING: SEVERAL DAYS
GAD7 TOTAL SCORE: 14
1. FEELING NERVOUS, ANXIOUS, OR ON EDGE: NEARLY EVERY DAY
7. FEELING AFRAID AS IF SOMETHING AWFUL MIGHT HAPPEN: MORE THAN HALF THE DAYS
4. TROUBLE RELAXING: SEVERAL DAYS
GAD7 TOTAL SCORE: 14
7. FEELING AFRAID AS IF SOMETHING AWFUL MIGHT HAPPEN: MORE THAN HALF THE DAYS
6. BECOMING EASILY ANNOYED OR IRRITABLE: NEARLY EVERY DAY
4. TROUBLE RELAXING: SEVERAL DAYS
GAD7 TOTAL SCORE: 14
7. FEELING AFRAID AS IF SOMETHING AWFUL MIGHT HAPPEN: MORE THAN HALF THE DAYS
7. FEELING AFRAID AS IF SOMETHING AWFUL MIGHT HAPPEN: MORE THAN HALF THE DAYS
3. WORRYING TOO MUCH ABOUT DIFFERENT THINGS: MORE THAN HALF THE DAYS
GAD7 TOTAL SCORE: 14
2. NOT BEING ABLE TO STOP OR CONTROL WORRYING: NEARLY EVERY DAY
2. NOT BEING ABLE TO STOP OR CONTROL WORRYING: NEARLY EVERY DAY
3. WORRYING TOO MUCH ABOUT DIFFERENT THINGS: MORE THAN HALF THE DAYS
GAD7 TOTAL SCORE: 14
6. BECOMING EASILY ANNOYED OR IRRITABLE: NEARLY EVERY DAY
5. BEING SO RESTLESS THAT IT IS HARD TO SIT STILL: NOT AT ALL
3. WORRYING TOO MUCH ABOUT DIFFERENT THINGS: MORE THAN HALF THE DAYS
GAD7 TOTAL SCORE: 14

## 2021-12-29 ASSESSMENT — PATIENT HEALTH QUESTIONNAIRE - PHQ9
SUM OF ALL RESPONSES TO PHQ QUESTIONS 1-9: 8
SUM OF ALL RESPONSES TO PHQ QUESTIONS 1-9: 8
10. IF YOU CHECKED OFF ANY PROBLEMS, HOW DIFFICULT HAVE THESE PROBLEMS MADE IT FOR YOU TO DO YOUR WORK, TAKE CARE OF THINGS AT HOME, OR GET ALONG WITH OTHER PEOPLE: SOMEWHAT DIFFICULT
SUM OF ALL RESPONSES TO PHQ QUESTIONS 1-9: 8
10. IF YOU CHECKED OFF ANY PROBLEMS, HOW DIFFICULT HAVE THESE PROBLEMS MADE IT FOR YOU TO DO YOUR WORK, TAKE CARE OF THINGS AT HOME, OR GET ALONG WITH OTHER PEOPLE: SOMEWHAT DIFFICULT
10. IF YOU CHECKED OFF ANY PROBLEMS, HOW DIFFICULT HAVE THESE PROBLEMS MADE IT FOR YOU TO DO YOUR WORK, TAKE CARE OF THINGS AT HOME, OR GET ALONG WITH OTHER PEOPLE: SOMEWHAT DIFFICULT
SUM OF ALL RESPONSES TO PHQ QUESTIONS 1-9: 8

## 2021-12-29 NOTE — GROUP NOTE
Psychoeducation Group Note    PATIENT'S NAME: Nora Zamorano  MRN:   [id number]  :   1970  ACCT. NUMBER: [id number]  DATE OF SERVICE: 21  START TIME:  3:00 PM  END TIME:  3:50 PM  FACILITATOR: Johana Carrasco RN  TOPIC: LAY RN Group: Mental Health Maintenance  [facility] Adult Mental Health Day Treatment  TRACK: 1B    NUMBER OF PARTICIPANTS: 6    Summary of Group / Topics Discussed:  Mental Health Maintenance:  Assessments of Strengths: Patients completed a self-reflection on personal strengths worksheet. The concept of personal strength as it relates to resilience were explored. Patients shared responses with the group and participated in discussion.     Patient Session Goals / Objectives:  ? Patients identified 1-3 qualities they consider a personal strength.  ? Patients understood the concept of personal strengths and the connection it has to resiliency                                    Service Modality:  Video Visit     Telemedicine Visit: The patient's condition can be safely assessed and treated via synchronous audio and visual telemedicine encounter.      Reason for Telemedicine Visit: Services only offered telehealth    Originating Site (Patient Location): Patient's home    Distant Site (Provider Location): Provider Remote Setting- Home Office    Consent:  The patient/guardian has verbally consented to: the potential risks and benefits of telemedicine (video visit) versus in person care; bill my insurance or make self-payment for services provided; and responsibility for payment of non-covered services.     Patient would like the video invitation sent by:  My Chart    Mode of Communication:  Video Conference via Medical Zoom    As the provider I attest to compliance with applicable laws and regulations related to telemedicine.           Patient Participation / Response:  Moderately participated, sharing some personal reflections / insights and adequately adequately received / provided  feedback with other participants.    Verbalized understanding of mental health maintenance topic    Treatment Plan:  Patient has a current master individualized treatment plan.  See Epic treatment plan for more information.    Johana Carrasco RN

## 2021-12-29 NOTE — GROUP NOTE
"Process Group Note    PATIENT'S NAME: Nora Zamorano  MRN:   [id number]  :   1970  ACCT. NUMBER: [id number]  DATE OF SERVICE: 21  START TIME:  1:00 PM  END TIME:  1:50 PM  FACILITATOR: Lindy Elizondo [facility]  TOPIC:  Process Group    Diagnoses:  309.81 (F43.10) Posttraumatic Stress Disorder    4. Other Diagnoses that is relevant to services:   296.32 (F33.1) Major Depressive Disorder, Recurrent Episode, Moderate; 300.02 (F41.1) Generalized Anxiety Disorder      [facility] Mental Health Day Treatment  TRACK: 1B    NUMBER OF PARTICIPANTS: 5                                      Service Modality:  Video Visit     Telemedicine Visit: The patient's condition can be safely assessed and treated via synchronous audio and visual telemedicine encounter.      Reason for Telemedicine Visit: Services only offered telehealth    Originating Site (Patient Location): Patient's home    Distant Site (Provider Location): Provider Remote Setting- Home Office    Consent:  The patient/guardian has verbally consented to: the potential risks and benefits of telemedicine (video visit) versus in person care; bill my insurance or make self-payment for services provided; and responsibility for payment of non-covered services.     Patient would like the video invitation sent by:  My Chart    Mode of Communication:  Video Conference via Medical Zoom    As the provider I attest to compliance with applicable laws and regulations related to telemedicine.                Data:    Session content: At the start of this group, patients were invited to check in by identifying themselves, describing their current emotional status, and identifying issues to address in this group.   Area(s) of treatment focus addressed in this session included Symptom Management and Personal Safety.    Nora Irving reported feeling \"okay\" today.  Her goal is to look into some grounding skills.  She said she had a very good meeting with the psychologist " today.  Patient declined additional process time but contributed to group discussion and provided feedback and support to peers.      Therapeutic Interventions/Treatment Strategies:  Psychotherapist offered support, feedback and validation and reinforced use of skills.    Assessment:    Patient response:   Patient responded to session by accepting feedback, giving feedback and listening    Possible barriers to participation / learning include: and no barriers identified    Health Issues:   None reported       Substance Use Review:   Substance Use: No active concerns identified.    Mental Status/Behavioral Observations  Appearance:   Appropriate   Eye Contact:   Good   Psychomotor Behavior: Normal   Attitude:   Cooperative   Orientation:   All  Speech   Rate / Production: Normal    Volume:  Normal   Mood:    Anxious  Depressed   Affect:    Appropriate   Thought Content:   Clear  Thought Form:  Coherent  Logical     Insight:    Good     Plan:     Safety Plan: No current safety concerns identified.  Recommended that patient call 911 or go to the local ED should there be a change in any of these risk factors.     Barriers to treatment: None identified    Patient Contracts (see media tab):  None    Substance Use: Provided encouragement towards sobriety    Provided support and affirmation for steps taken towards sobriety      Continue or Discharge: Patient will continue in Adult Day Treatment (ADT)  as planned. Patient is likely to benefit from learning and using skills as they work toward the goals identified in their treatment plan.      Lindy Elizondo, [facility]  December 29, 2021

## 2021-12-29 NOTE — ADDENDUM NOTE
Encounter addended by: Lanette Lorenzana PsyD on: 12/29/2021 11:36 AM   Actions taken: Episode edited, Clinical Note Signed

## 2021-12-29 NOTE — GROUP NOTE
Psychoeducation Group Note    PATIENT'S NAME: Nora Zamorano  MRN:   [id number]  :   1970  ACCT. NUMBER: [id number]  DATE OF SERVICE: 21  START TIME:  2:00 PM  END TIME:  2:50 PM  FACILITATOR: Taras Devi OTR/L  TOPIC: MH Life Skills Group: Communication and Social Skills Development                                    Service Modality:  Video Visit     Telemedicine Visit: The patient's condition can be safely assessed and treated via synchronous audio and visual telemedicine encounter.      Reason for Telemedicine Visit: Services only offered telehealth    Originating Site (Patient Location): Patient's home    Distant Site (Provider Location): Provider Remote Setting- Home Office    Consent:  The patient/guardian has verbally consented to: the potential risks and benefits of telemedicine (video visit) versus in person care; bill my insurance or make self-payment for services provided; and responsibility for payment of non-covered services.     Mode of Communication:  Video Conference via Medical Zoom    As the provider I attest to compliance with applicable laws and regulations related to telemedicine.       [facility] Mental Health Day Treatment  TRACK: 1B    NUMBER OF PARTICIPANTS: 5    Summary of Group / Topics Discussed:  Communication and Social Skills Development: Communication Skills: Patients were taught and gained awareness of effective communication skills in the following areas: Trust building, verbal communication, listening, and non verbal communication.  Patients identified both personal strengths and opportunities for growth in these areas to improve overall communication and connection with other people.     Patient Session Goals / Objectives:    Identified strengths and opportunities for growth in communication skills and how these  impact their ability to communicate clearly with other people       Improved awareness of important aspects of communication skills  and how this relates to mental health recovery        Established a plan for practice of these skills in their own environments    Practiced and reflected on how to generalize taught skills to their everyday life      Patient Participation / Response:  Fully participated with the group by sharing personal reflections / insights and openly received / provided feedback with other participants.    Demonstrated understanding of content through video/handout/group discussion , Verbalized understanding of content and Patient would benefit from additional opportunities to practice the content to be able to generalize it to their everyday life with increased intentionality, consistency, and efficacy in support of their psychiatric recovery    Treatment Plan:  Patient has a current master individualized treatment plan.  See Epic treatment plan for more information.    Taras Devi, OTR/L

## 2021-12-30 ENCOUNTER — HOSPITAL ENCOUNTER (OUTPATIENT)
Dept: BEHAVIORAL HEALTH | Facility: CLINIC | Age: 51
End: 2021-12-30
Attending: PSYCHIATRY & NEUROLOGY
Payer: MEDICARE

## 2021-12-30 PROCEDURE — G0177 OPPS/PHP; TRAIN & EDUC SERV: HCPCS | Mod: PO

## 2021-12-30 PROCEDURE — 90853 GROUP PSYCHOTHERAPY: CPT | Mod: GT | Performed by: COUNSELOR

## 2021-12-30 PROCEDURE — 90853 GROUP PSYCHOTHERAPY: CPT | Mod: PO | Performed by: COUNSELOR

## 2021-12-30 ASSESSMENT — ANXIETY QUESTIONNAIRES
GAD7 TOTAL SCORE: 14

## 2021-12-30 ASSESSMENT — PATIENT HEALTH QUESTIONNAIRE - PHQ9
SUM OF ALL RESPONSES TO PHQ QUESTIONS 1-9: 8

## 2021-12-30 NOTE — GROUP NOTE
"Process Group Note    PATIENT'S NAME: Nora Zamorano  MRN:   [id number]  :   1970  ACCT. NUMBER: [id number]  DATE OF SERVICE: 21  START TIME:  1:00 PM  END TIME:  1:50 PM  FACILITATOR: Lindy Elizondo [facility]  TOPIC:  Process Group    Diagnoses:  309.81 (F43.10) Posttraumatic Stress Disorder    4. Other Diagnoses that is relevant to services:   296.32 (F33.1) Major Depressive Disorder, Recurrent Episode, Moderate; 300.02 (F41.1) Generalized Anxiety Disorder      [facility] Mental Health Day Treatment  TRACK: 1B    NUMBER OF PARTICIPANTS: 5                                      Service Modality:  Video Visit     Telemedicine Visit: The patient's condition can be safely assessed and treated via synchronous audio and visual telemedicine encounter.      Reason for Telemedicine Visit: Services only offered telehealth    Originating Site (Patient Location): Patient's home    Distant Site (Provider Location): Provider Remote Setting- Home Office    Consent:  The patient/guardian has verbally consented to: the potential risks and benefits of telemedicine (video visit) versus in person care; bill my insurance or make self-payment for services provided; and responsibility for payment of non-covered services.     Patient would like the video invitation sent by:  My Chart    Mode of Communication:  Video Conference via Medical Zoom    As the provider I attest to compliance with applicable laws and regulations related to telemedicine.                Data:    Session content: At the start of this group, patients were invited to check in by identifying themselves, describing their current emotional status, and identifying issues to address in this group.   Area(s) of treatment focus addressed in this session included Symptom Management and Personal Safety.    Nora Irving reported feeling \"distracted\" and \"all over the place\" today.  Her goal for the day is to share a handout on grounding with her partner.  " Patient declined additional process time but contributed to group discussion and provided feedback and support to peers.      Therapeutic Interventions/Treatment Strategies:  Psychotherapist offered support, feedback and validation and reinforced use of skills.    Assessment:    Patient response:   Patient responded to session by accepting feedback, giving feedback and listening    Possible barriers to participation / learning include: and no barriers identified    Health Issues:   None reported       Substance Use Review:   Substance Use: No active concerns identified.    Mental Status/Behavioral Observations  Appearance:   Appropriate   Eye Contact:   Good   Psychomotor Behavior: Normal   Attitude:   Cooperative   Orientation:   All  Speech   Rate / Production: Normal    Volume:  Normal   Mood:    Depressed   Affect:    Appropriate   Thought Content:   Clear  Thought Form:  Coherent  Logical     Insight:    Good     Plan:     Safety Plan: No current safety concerns identified.  Recommended that patient call 911 or go to the local ED should there be a change in any of these risk factors.     Barriers to treatment: None identified    Patient Contracts (see media tab):  None    Substance Use: Not addressed in session     Continue or Discharge: Patient will continue in Adult Day Treatment (ADT)  as planned. Patient is likely to benefit from learning and using skills as they work toward the goals identified in their treatment plan.      Lindy Elizondo, [facility]  December 30, 2021

## 2021-12-30 NOTE — GROUP NOTE
Psychotherapy Group Note    PATIENT'S NAME: Nora Zamorano  MRN:   [id number]  :   1970  ACCT. NUMBER: [id number]  DATE OF SERVICE: 21  START TIME:  3:00 PM  END TIME:  3:50 PM  FACILITATOR: Lindy Elizondo [facility]  TOPIC: MH EBP Group: Behavioral Activation  [facility] Adult Mental Health Day Treatment  TRACK: 1B    NUMBER OF PARTICIPANTS: 7                                      Service Modality:  Video Visit     Telemedicine Visit: The patient's condition can be safely assessed and treated via synchronous audio and visual telemedicine encounter.      Reason for Telemedicine Visit: Services only offered telehealth    Originating Site (Patient Location): Patient's home    Distant Site (Provider Location): Provider Remote Setting- Home Office    Consent:  The patient/guardian has verbally consented to: the potential risks and benefits of telemedicine (video visit) versus in person care; bill my insurance or make self-payment for services provided; and responsibility for payment of non-covered services.     Patient would like the video invitation sent by:  My Chart    Mode of Communication:  Video Conference via Medical Zoom    As the provider I attest to compliance with applicable laws and regulations related to telemedicine.          Summary of Group / Topics Discussed:  Behavioral Activation: Motivation and Procrastination: Patients explored how they currently spend their time, identifying thoughts and feelings that are motivating and serve to increase desired behaviors.  They also examined behaviors that contribute to procrastination.  Different types of procrastination behaviors were identified, and strategies to reduce individual procrastination and increase motivation were explored and practiced.  Patients identified ways to increase goal-directed activities to enhance mood and reduce symptoms.        Patient Session Goals / Objectives:    Identify current patterns of procrastination behavior  and how they influence thoughts and moods, and inhibit motivation.    Identify behaviors that can be implemented that contribute to improving thoughts and feelings, motivation, and reduce symptoms.    Identify and develop a plan to increase activities that promote a sense of accomplishment and competence.    Practice scheduling positive activities / behaviors into daily routines.      Patient Participation / Response:  Fully participated with the group by sharing personal reflections / insights and openly received / provided feedback with other participants.    Demonstrated understanding of topics discussed through group discussion and participation, Expressed understanding of the relationship between behaviors, thoughts, and feelings and Shared experiences and challenges with making behavioral changes    Treatment Plan:  Patient has a current master individualized treatment plan.  See Epic treatment plan for more information.    Lindy Elizondo, [facility]

## 2021-12-30 NOTE — GROUP NOTE
Psychoeducation Group Note    PATIENT'S NAME: Nora Zamorano  MRN:   [id number]  :   1970  ACCT. NUMBER: [id number]  DATE OF SERVICE: 21  START TIME:  2:00 PM  END TIME:  2:50 PM  FACILITATOR: Taras Devi OTR/L  TOPIC: MH Life Skills Group: Resiliency Development                                    Service Modality:  Video Visit     Telemedicine Visit: The patient's condition can be safely assessed and treated via synchronous audio and visual telemedicine encounter.      Reason for Telemedicine Visit: Services only offered telehealth    Originating Site (Patient Location): Patient's home    Distant Site (Provider Location): Provider Remote Setting- Home Office    Consent:  The patient/guardian has verbally consented to: the potential risks and benefits of telemedicine (video visit) versus in person care; bill my insurance or make self-payment for services provided; and responsibility for payment of non-covered services.     Mode of Communication:  Video Conference via Medical Zoom    As the provider I attest to compliance with applicable laws and regulations related to telemedicine.       [facility] Adult Mental Health Day Treatment  TRACK: 1B    NUMBER OF PARTICIPANTS: 7    Summary of Group / Topics Discussed:  Resiliency Development:  Coping Skills: Personal Recovery Outcome Measure: Patients were taught how to identify coping strategies and routines that they can adopt and use for management with focus on a balance between physical, emotional, social and spiritual strategies.    Patient Session Goals / Objectives:    Identified personal definitions of recovery for effectively managing mental health     Improved awareness of the process of recovery and skills and strategies that support this       Established a plan for practice of these skills in their own environments    Practiced and reflected on how to generalize taught skills to their everyday life      Patient Participation /  Response:  Fully participated with the group by sharing personal reflections / insights and openly received / provided feedback with other participants.    Demonstrated understanding of content through handouts/group discussion , Verbalized understanding of content and Patient would benefit from additional opportunities to practice the content to be able to generalize it to their everyday life with increased intentionality, consistency, and efficacy in support of their psychiatric recovery    Treatment Plan:  Patient has a current master individualized treatment plan.  See Epic treatment plan for more information.    Taras Devi, OTR/L       - cont Lovenox 40 SubQ - VRE UTI Resolved  - s/p course of levofloxacin

## 2022-01-03 ENCOUNTER — HOSPITAL ENCOUNTER (OUTPATIENT)
Dept: BEHAVIORAL HEALTH | Facility: CLINIC | Age: 52
End: 2022-01-03
Attending: PSYCHIATRY & NEUROLOGY
Payer: MEDICARE

## 2022-01-03 PROCEDURE — G0177 OPPS/PHP; TRAIN & EDUC SERV: HCPCS | Mod: PO

## 2022-01-03 PROCEDURE — 90853 GROUP PSYCHOTHERAPY: CPT | Mod: PO | Performed by: COUNSELOR

## 2022-01-03 NOTE — GROUP NOTE
"Psychoeducation Group Note    PATIENT'S NAME: Nora Zamorano  MRN:   [id number]  :   1970  ACCT. NUMBER: [id number]  DATE OF SERVICE: 22  START TIME:  3:00 PM  END TIME:  3:50 PM  FACILITATOR: Dary Rondon RN  TOPIC: MH RN Group: Mental Health Maintenance                                    Service Modality:  Video Visit     Telemedicine Visit: The patient's condition can be safely assessed and treated via synchronous audio and visual telemedicine encounter.      Reason for Telemedicine Visit:  covid9    Originating Site (Patient Location): Patient's home    Distant Site (Provider Location): Provider Remote Setting- Home Office    Consent:  The patient/guardian has verbally consented to: the potential risks and benefits of telemedicine (video visit) versus in person care; bill my insurance or make self-payment for services provided; and responsibility for payment of non-covered services.     Patient would like the video invitation sent by:  My Chart    Mode of Communication:  Video Conference via Medical Zoom    As the provider I attest to compliance with applicable laws and regulations related to telemedicine.          [facility] Adult Mental Health Day Treatment  TRACK: 1B    NUMBER OF PARTICIPANTS: 6    Summary of Group / Topics Discussed:  Mental Health Maintenance:  Anatomy of Trust: Patients discussed trust and watched Chava Cody's \"Anatomy of Trust\" video. Discussed the BRAVING acronym and strengthening self trust.    Patient Session Goals / Objectives:  ? Patients will have a deeper understanding of trust  ? Patients will have tools to have conversations with others about trust.  ? Patients will think of 1+ ways to strengthen self trust.          Patient Participation / Response:  Moderately participated, sharing some personal reflections / insights and adequately adequately received / provided feedback with other participants.    Verbalized understanding of mental health maintenance " topic    Treatment Plan:  Patient has a current master individualized treatment plan.  See Epic treatment plan for more information.    Dary Rondon RN

## 2022-01-03 NOTE — GROUP NOTE
Psychoeducation Group Note    PATIENT'S NAME: Nora Zamorano  MRN:   [id number]  :   1970  ACCT. NUMBER: [id number]  DATE OF SERVICE: 22  START TIME:  3:00 PM  END TIME:  3:50 PM  FACILITATOR: Taras Devi OTR/L  TOPIC: MH Life Skills Group: Resiliency Development                                    Service Modality:  Video Visit     Telemedicine Visit: The patient's condition can be safely assessed and treated via synchronous audio and visual telemedicine encounter.      Reason for Telemedicine Visit: Services only offered telehealth    Originating Site (Patient Location): Patient's home    Distant Site (Provider Location): Provider Remote Setting- Home Office    Consent:  The patient/guardian has verbally consented to: the potential risks and benefits of telemedicine (video visit) versus in person care; bill my insurance or make self-payment for services provided; and responsibility for payment of non-covered services.     Mode of Communication:  Video Conference via Medical Zoom    As the provider I attest to compliance with applicable laws and regulations related to telemedicine.       [facility] Adult Mental Health Day Treatment  TRACK: 1B    NUMBER OF PARTICIPANTS: 6    Summary of Group / Topics Discussed:  Resiliency Development:  Coping Skills(Procrastination): Patients were taught how to identify stressors, signs of stress, coping skills, and prevention strategies for overall stress management.  Patients were given the opportunity to identify both ongoing and acute mental health symptoms and how to effectively manage these symptoms by developing an effective aftercare plan.  Patients increased awareness of community based resources.    Patient Session Goals / Objectives:    Identified how using coping skills can be used for symptom and stress management       Improved awareness of individualed symptoms and stressors and how to effectively cope     Established a relapse prevention  plan to practice these skills in their own environments    Practiced and reflected on how to generalize taught skills to their everyday life        Patient Participation / Response:  Fully participated with the group by sharing personal reflections / insights and openly received / provided feedback with other participants.    Demonstrated understanding of content through handouts/group discussion , Verbalized understanding of content and Patient would benefit from additional opportunities to practice the content to be able to generalize it to their everyday life with increased intentionality, consistency, and efficacy in support of their psychiatric recovery    Treatment Plan:  Patient has a current master individualized treatment plan.  See Epic treatment plan for more information.    Taras Devi, OTR/L

## 2022-01-03 NOTE — GROUP NOTE
"Process Group Note    PATIENT'S NAME: Nora Zamorano  MRN:   [id number]  :   1970  ACCT. NUMBER: [id number]  DATE OF SERVICE: 22  START TIME:  1:00 PM  END TIME:  1:50 PM  FACILITATOR: Lindy Elizondo [facility]  TOPIC:  Process Group    Diagnoses:  309.81 (F43.10) Posttraumatic Stress Disorder    4. Other Diagnoses that is relevant to services:   296.32 (F33.1) Major Depressive Disorder, Recurrent Episode, Moderate; 300.02 (F41.1) Generalized Anxiety Disorder      [facility] Mental Health Day Treatment  TRACK: 1B    NUMBER OF PARTICIPANTS: 5                                      Service Modality:  Video Visit     Telemedicine Visit: The patient's condition can be safely assessed and treated via synchronous audio and visual telemedicine encounter.      Reason for Telemedicine Visit: Services only offered telehealth    Originating Site (Patient Location): Patient's home    Distant Site (Provider Location): Provider Remote Setting- Home Office    Consent:  The patient/guardian has verbally consented to: the potential risks and benefits of telemedicine (video visit) versus in person care; bill my insurance or make self-payment for services provided; and responsibility for payment of non-covered services.     Patient would like the video invitation sent by:  My Chart    Mode of Communication:  Video Conference via Medical Zoom    As the provider I attest to compliance with applicable laws and regulations related to telemedicine.                Data:    Session content: At the start of this group, patients were invited to check in by identifying themselves, describing their current emotional status, and identifying issues to address in this group.   Area(s) of treatment focus addressed in this session included Symptom Management and Personal Safety.    Nora Irving reported feeling \"stressed out\" today.  Her goal is to work on \"quieting her mind\" by challenging some of her cognitive distortions. She " reported she had trouble sleeping last night because she used cannabis later than usual. Patient declined additional process time but contributed to group discussion and provided feedback and support to peers.      Therapeutic Interventions/Treatment Strategies:  Psychotherapist offered support, feedback and validation and reinforced use of skills.    Assessment:    Patient response:   Patient responded to session by accepting feedback, giving feedback and listening    Possible barriers to participation / learning include: and no barriers identified    Health Issues:   None reported       Substance Use Review:   Substance Use: cannabis .     Mental Status/Behavioral Observations  Appearance:   Appropriate   Eye Contact:   Good   Psychomotor Behavior: Normal   Attitude:   Cooperative   Orientation:   All  Speech   Rate / Production: Normal    Volume:  Normal   Mood:    Anxious   Affect:    Appropriate   Thought Content:   Clear  Thought Form:  Coherent  Logical     Insight:    Good     Plan:     Safety Plan: No current safety concerns identified.  Recommended that patient call 911 or go to the local ED should there be a change in any of these risk factors.     Barriers to treatment: None identified    Patient Contracts (see media tab):  None    Substance Use: Not addressed in session     Continue or Discharge: Patient will continue in Adult Day Treatment (ADT)  as planned. Patient is likely to benefit from learning and using skills as they work toward the goals identified in their treatment plan.      Lindy Elizondo, [facility]  January 3, 2022

## 2022-01-05 ENCOUNTER — HOSPITAL ENCOUNTER (OUTPATIENT)
Dept: BEHAVIORAL HEALTH | Facility: CLINIC | Age: 52
End: 2022-01-05
Attending: PSYCHIATRY & NEUROLOGY
Payer: MEDICARE

## 2022-01-05 PROCEDURE — G0177 OPPS/PHP; TRAIN & EDUC SERV: HCPCS | Mod: GT

## 2022-01-05 PROCEDURE — 90853 GROUP PSYCHOTHERAPY: CPT | Mod: GT | Performed by: COUNSELOR

## 2022-01-05 NOTE — GROUP NOTE
Process Group Note    PATIENT'S NAME: Nora Zamorano  MRN:   [id number]  :   1970  ACCT. NUMBER: [id number]  DATE OF SERVICE: 22  START TIME:  1:00 PM  END TIME:  1:50 PM  FACILITATOR: Linyd Elizondo [facility]  TOPIC:  Process Group    Diagnoses:  309.81 (F43.10) Posttraumatic Stress Disorder    4. Other Diagnoses that is relevant to services:   296.32 (F33.1) Major Depressive Disorder, Recurrent Episode, Moderate; 300.02 (F41.1) Generalized Anxiety Disorder      [facility] Mental Health Day Treatment  TRACK: 1B    NUMBER OF PARTICIPANTS: 5                                      Service Modality:  Video Visit     Telemedicine Visit: The patient's condition can be safely assessed and treated via synchronous audio and visual telemedicine encounter.      Reason for Telemedicine Visit: Services only offered telehealth    Originating Site (Patient Location): Patient's home    Distant Site (Provider Location): Provider Remote Setting- Home Office    Consent:  The patient/guardian has verbally consented to: the potential risks and benefits of telemedicine (video visit) versus in person care; bill my insurance or make self-payment for services provided; and responsibility for payment of non-covered services.     Patient would like the video invitation sent by:  My Chart    Mode of Communication:  Video Conference via Medical Zoom    As the provider I attest to compliance with applicable laws and regulations related to telemedicine.                Data:    Session content: At the start of this group, patients were invited to check in by identifying themselves, describing their current emotional status, and identifying issues to address in this group.   Area(s) of treatment focus addressed in this session included Symptom Management and Personal Safety.    Dominic reported feeling tired but in a good mood.  Her goal is to participate in group despite being tired. She reported she went to the [facility]  last night to seek advice on her cannabis use and found out she was taking way too much THC so feels better now that she has more information.  Patient declined additional process time but contributed to group discussion and provided feedback and support to peers.      Therapeutic Interventions/Treatment Strategies:  Psychotherapist offered support, feedback and validation and reinforced use of skills.    Assessment:    Patient response:   Patient responded to session by accepting feedback, giving feedback and listening    Possible barriers to participation / learning include: and no barriers identified    Health Issues:   None reported       Substance Use Review:   Substance Use: No active concerns identified.    Mental Status/Behavioral Observations  Appearance:   Appropriate   Eye Contact:   Good   Psychomotor Behavior: Normal   Attitude:   Cooperative   Orientation:   All  Speech   Rate / Production: Normal    Volume:  Normal   Mood:    Normal  Affect:    Appropriate   Thought Content:   Clear  Thought Form:  Coherent  Logical     Insight:    Good     Plan:     Safety Plan: No current safety concerns identified.  Recommended that patient call 911 or go to the local ED should there be a change in any of these risk factors.     Barriers to treatment: None identified    Patient Contracts (see media tab):  None    Substance Use: Not addressed in session     Continue or Discharge: Patient will continue in Adult Day Treatment (ADT)  as planned. Patient is likely to benefit from learning and using skills as they work toward the goals identified in their treatment plan.      Lindy Elizondo, [facility]  January 5, 2022

## 2022-01-05 NOTE — GROUP NOTE
"Psychoeducation Group Note    PATIENT'S NAME: Nora Zamorano  MRN:   [id number]  :   1970  ACCT. NUMBER: [id number]  DATE OF SERVICE: 22  START TIME:  3:00 PM  END TIME:  3:50 PM  FACILITATOR: Dary Rondon RN  TOPIC: MH RN Group: Mental Health Maintenance                                    Service Modality:  Video Visit     Telemedicine Visit: The patient's condition can be safely assessed and treated via synchronous audio and visual telemedicine encounter.      Reason for Telemedicine Visit:  covid19    Originating Site (Patient Location): Patient's home    Distant Site (Provider Location): Provider Remote Setting- Home Office    Consent:  The patient/guardian has verbally consented to: the potential risks and benefits of telemedicine (video visit) versus in person care; bill my insurance or make self-payment for services provided; and responsibility for payment of non-covered services.     Patient would like the video invitation sent by:  My Chart    Mode of Communication:  Video Conference via Medical Zoom    As the provider I attest to compliance with applicable laws and regulations related to telemedicine.          [facility] Adult Mental Health Day Treatment  TRACK: 1B    NUMBER OF PARTICIPANTS: 8    Summary of Group / Topics Discussed:  Mental Health Maintenance:   Anatomy of Trust: Patients discussed trust and watched Chava Cody's \"Anatomy of Trust\" video. Discussed the BRAVING acronym and strengthening self trust. Continued discussion    Patient Session Goals / Objectives:  ? Patients will have a deeper understanding of trust  ? Patients will have tools to have conversations with others about trust.  ? Patients will think of 1+ ways to strengthen self trust.          Patient Participation / Response:  Fully participated with the group by sharing personal reflections / insights and openly received / provided feedback with other participants.    Demonstrated understanding of topics " discussed through group discussion and participation and Identified / Expressed personal readiness to practice skills    Treatment Plan:  Patient has a current master individualized treatment plan.  See Epic treatment plan for more information.    Dary Rondon RN

## 2022-01-05 NOTE — GROUP NOTE
Psychoeducation Group Note    PATIENT'S NAME: Nora Zamorano  MRN:   [id number]  :   1970  ACCT. NUMBER: [id number]  DATE OF SERVICE: 22  START TIME:  2:00 PM  END TIME:  2:50 PM  FACILITATOR: Taras Devi OTR/L  TOPIC: MH Life Skills Group: Resiliency Development                                    Service Modality:  Video Visit     Telemedicine Visit: The patient's condition can be safely assessed and treated via synchronous audio and visual telemedicine encounter.      Reason for Telemedicine Visit: Services only offered telehealth    Originating Site (Patient Location): Patient's home    Distant Site (Provider Location): Provider Remote Setting- Home Office    Consent:  The patient/guardian has verbally consented to: the potential risks and benefits of telemedicine (video visit) versus in person care; bill my insurance or make self-payment for services provided; and responsibility for payment of non-covered services.     Mode of Communication:  Video Conference via Medical Zoom    As the provider I attest to compliance with applicable laws and regulations related to telemedicine.       [facility] Adult Mental Health Day Treatment  TRACK: 1B    NUMBER OF PARTICIPANTS: 8    Summary of Group / Topics Discussed:  Resiliency Development:  Coping Skills(Wellness Recovery Action Plan): Patients were taught how to identify stressors, signs of stress, coping skills, and prevention strategies for overall stress management.  Patients were given the opportunity to identify both ongoing and acute mental health symptoms and how to effectively manage these symptoms by developing an effective aftercare plan.  Patients increased awareness of community based resources.    Patient Session Goals / Objectives:    Identified how using coping skills can be used for symptom and stress management       Improved awareness of individualed symptoms and stressors and how to effectively cope     Established a  relapse prevention plan to practice these skills in their own environments    Practiced and reflected on how to generalize taught skills to their everyday life        Patient Participation / Response:  Fully participated with the group by sharing personal reflections / insights and openly received / provided feedback with other participants.    Demonstrated understanding of content through handouts/group discussion , Verbalized understanding of content and Patient would benefit from additional opportunities to practice the content to be able to generalize it to their everyday life with increased intentionality, consistency, and efficacy in support of their psychiatric recovery    Treatment Plan:  Patient has a current master individualized treatment plan.  See Epic treatment plan for more information.    Taras Devi, OTR/L

## 2022-01-10 ENCOUNTER — HOSPITAL ENCOUNTER (OUTPATIENT)
Dept: BEHAVIORAL HEALTH | Facility: CLINIC | Age: 52
End: 2022-01-10
Attending: PSYCHIATRY & NEUROLOGY
Payer: MEDICARE

## 2022-01-10 PROCEDURE — G0177 OPPS/PHP; TRAIN & EDUC SERV: HCPCS | Mod: PO

## 2022-01-10 PROCEDURE — 90853 GROUP PSYCHOTHERAPY: CPT | Mod: PO | Performed by: COUNSELOR

## 2022-01-10 NOTE — GROUP NOTE
Psychoeducation Group Note    PATIENT'S NAME: Nora Zamorano  MRN:   [id number]  :   1970  ACCT. NUMBER: [id number]  DATE OF SERVICE: 1/10/22  START TIME:  3:00 PM  END TIME:  3:50 PM  FACILITATOR: Dary Rondon RN  TOPIC: LAY RN Group: [facility]                                    Service Modality:  Video Visit     Telemedicine Visit: The patient's condition can be safely assessed and treated via synchronous audio and visual telemedicine encounter.      Reason for Telemedicine Visit:  covid19    Originating Site (Patient Location): Patient's home    Distant Site (Provider Location): Provider Remote Setting- Home Office    Consent:  The patient/guardian has verbally consented to: the potential risks and benefits of telemedicine (video visit) versus in person care; bill my insurance or make self-payment for services provided; and responsibility for payment of non-covered services.     Patient would like the video invitation sent by:  My Chart    Mode of Communication:  Video Conference via Medical Zoom    As the provider I attest to compliance with applicable laws and regulations related to telemedicine.          [facility] Mental Health Day Treatment  TRACK: 1B    NUMBER OF PARTICIPANTS: 6    Summary of Group / Topics Discussed:  Foundations of Health: Nutrition: Macronutrients: Patient were provided education on major macronutrients, their role in the body, and why it is important to meet daily nutritional needs. Obstacles to meeting daily nutritional needs were identified in group discussion and strategies to promote improved nutrition were explored. Macronutrients discussed include: carbohydrates, proteins and amino acids, fats, fiber, and water.     Patient Session Goals / Objectives:  ? Discussed the role of diet on mood, physical health, energy level, and the development of chronic disease.  ? Identified daily nutritional needs recommended by the [employer] via My Plate education  resources  ? Developing increased health literacy skills in finding credible nutrition information from reliable sources      Patient Participation / Response:  Fully participated with the group by sharing personal reflections / insights and openly received / provided feedback with other participants.    Demonstrated understanding of topics discussed through group discussion and participation and Identified / Expressed personal readiness to practice skills    Treatment Plan:  Patient has a current master individualized treatment plan.  See Epic treatment plan for more information.    Dary Rondon RN

## 2022-01-10 NOTE — GROUP NOTE
Psychoeducation Group Note    PATIENT'S NAME: Nora Zamorano  MRN:   [id number]  :   1970  ACCT. NUMBER: [id number]  DATE OF SERVICE: 1/10/22  START TIME:  2:00 PM  END TIME:  2:50 PM  FACILITATOR: Taras Devi OTR/L  TOPIC: MH Life Skills Group: Resiliency Development                                    Service Modality:  Video Visit     Telemedicine Visit: The patient's condition can be safely assessed and treated via synchronous audio and visual telemedicine encounter.      Reason for Telemedicine Visit: Services only offered telehealth    Originating Site (Patient Location): Patient's home    Distant Site (Provider Location): Provider Remote Setting- Home Office    Consent:  The patient/guardian has verbally consented to: the potential risks and benefits of telemedicine (video visit) versus in person care; bill my insurance or make self-payment for services provided; and responsibility for payment of non-covered services.    Mode of Communication:  Video Conference via Medical Zoom    As the provider I attest to compliance with applicable laws and regulations related to telemedicine.       [facility] Adult Mental Health Day Treatment  TRACK: 1B    NUMBER OF PARTICIPANTS: 5    Summary of Group / Topics Discussed:  Resiliency Development:  Characteristics of Resiliency to Manage Stress: Patients were taught how to identify stressors, signs of stress, coping skills, and prevention strategies for overall stress management.  Patients were given the opportunity to identify both ongoing and acute mental health symptoms and how to effectively manage these symptoms by developing an effective aftercare plan.  Patients increased awareness of community based resources.    Patient Session Goals / Objectives:    Identified how using coping skills can be used for symptom and stress management       Improved awareness of individualed symptoms and stressors and how to effectively cope     Established a  relapse prevention plan to practice these skills in their own environments    Practiced and reflected on how to generalize taught skills to their everyday life        Patient Participation / Response:  Fully participated with the group by sharing personal reflections / insights and openly received / provided feedback with other participants.    Demonstrated understanding of content through handouts/discussion , Verbalized understanding of content and Patient would benefit from additional opportunities to practice the content to be able to generalize it to their everyday life with increased intentionality, consistency, and efficacy in support of their psychiatric recovery    Treatment Plan:  Patient has a current master individualized treatment plan.  See Epic treatment plan for more information.    Taras Devi, OTR/L

## 2022-01-10 NOTE — GROUP NOTE
"Process Group Note    PATIENT'S NAME: Nora Zamorano  MRN:   [id number]  :   1970  ACCT. NUMBER: [id number]  DATE OF SERVICE: 1/10/22  START TIME:  1:00 PM  END TIME:  1:50 PM  FACILITATOR: Lindy Elizondo [facility]  TOPIC:  Process Group    Diagnoses:  309.81 (F43.10) Posttraumatic Stress Disorder    4. Other Diagnoses that is relevant to services:   296.32 (F33.1) Major Depressive Disorder, Recurrent Episode, Moderate; 300.02 (F41.1) Generalized Anxiety Disorder      [facility] Mental Health Day Treatment  TRACK: 1B    NUMBER OF PARTICIPANTS: 6                                      Service Modality:  Video Visit     Telemedicine Visit: The patient's condition can be safely assessed and treated via synchronous audio and visual telemedicine encounter.      Reason for Telemedicine Visit: Services only offered telehealth    Originating Site (Patient Location): Patient's home    Distant Site (Provider Location): Provider Remote Setting- Home Office    Consent:  The patient/guardian has verbally consented to: the potential risks and benefits of telemedicine (video visit) versus in person care; bill my insurance or make self-payment for services provided; and responsibility for payment of non-covered services.     Patient would like the video invitation sent by:  My Chart    Mode of Communication:  Video Conference via Medical Zoom    As the provider I attest to compliance with applicable laws and regulations related to telemedicine.                Data:    Session content: At the start of this group, patients were invited to check in by identifying themselves, describing their current emotional status, and identifying issues to address in this group.   Area(s) of treatment focus addressed in this session included Symptom Management and Personal Safety.    Nora Irving reported feeling \"okay\" today.  Her goal is to practice some new grounding skills she learned in individual therapy.  Patient declined " additional process time but contributed to group discussion and provided feedback and support to peers.      Therapeutic Interventions/Treatment Strategies:  Psychotherapist offered support, feedback and validation and reinforced use of skills.    Assessment:    Patient response:   Patient responded to session by accepting feedback, giving feedback and listening    Possible barriers to participation / learning include: and no barriers identified    Health Issues:   None reported       Substance Use Review:   Substance Use: cannabis .     Mental Status/Behavioral Observations  Appearance:   Appropriate   Eye Contact:   Good   Psychomotor Behavior: Normal   Attitude:   Cooperative   Orientation:   All  Speech   Rate / Production: Normal    Volume:  Normal   Mood:    Depressed   Affect:    Appropriate   Thought Content:   Safety reports  presence of suicidal ideation passive suicidal ideation   Thought Form:  Coherent  Logical     Insight:    Good     Plan:     Safety Plan: Committed to safety and agreed to follow previously developed safety coping plan.      Barriers to treatment: None identified    Patient Contracts (see media tab):  None    Substance Use: Not addressed in session     Continue or Discharge: Patient will continue in Adult Day Treatment (ADT)  as planned. Patient is likely to benefit from learning and using skills as they work toward the goals identified in their treatment plan.      Lindy Elizondo, [facility]  January 10, 2022

## 2022-01-12 ENCOUNTER — HOSPITAL ENCOUNTER (OUTPATIENT)
Dept: BEHAVIORAL HEALTH | Facility: CLINIC | Age: 52
End: 2022-01-12
Attending: PSYCHIATRY & NEUROLOGY
Payer: MEDICARE

## 2022-01-12 PROCEDURE — G0177 OPPS/PHP; TRAIN & EDUC SERV: HCPCS | Mod: PO

## 2022-01-12 PROCEDURE — 90853 GROUP PSYCHOTHERAPY: CPT | Mod: GT | Performed by: COUNSELOR

## 2022-01-12 NOTE — GROUP NOTE
Psychoeducation Group Note    PATIENT'S NAME: Nora Zamorano  MRN:   [id number]  :   1970  ACCT. NUMBER: [id number]  DATE OF SERVICE: 22  START TIME:  3:00 PM  END TIME:  3:50 PM  FACILITATOR: Dary Rondon, ANDREA  TOPIC: LAY RN Group: [facility]                                    Service Modality:  Video Visit     Telemedicine Visit: The patient's condition can be safely assessed and treated via synchronous audio and visual telemedicine encounter.      Reason for Telemedicine Visit:  covid19    Originating Site (Patient Location): Patient's home    Distant Site (Provider Location): Provider Remote Setting- Home Office    Consent:  The patient/guardian has verbally consented to: the potential risks and benefits of telemedicine (video visit) versus in person care; bill my insurance or make self-payment for services provided; and responsibility for payment of non-covered services.     Patient would like the video invitation sent by:  My Chart    Mode of Communication:  Video Conference via Medical Zoom    As the provider I attest to compliance with applicable laws and regulations related to telemedicine.          [facility] Adult Mental Health Day Treatment  TRACK: 1B    NUMBER OF PARTICIPANTS: 8    Summary of Group / Topics Discussed:  Foundations of Health: Nutrition: Super Nutrients & Micronutrients, functional nutrition and some gut-brain connection: Super Nutrients are Foods that have high nutritional yield. Micronutrients are essential elements found in food or taken through supplements that are necessary for normal physiological functioning. This group was designed to complement the macronutrients group and build upon previous education. The changes that food makes on the brain (how the brain uses sugar) and nutrition as it relates to mental health was also discussed.       Patient Session Goals / Objectives:  ? Identified the health enhancing benefits to good  nutrition  ? Verbalized ways in which the food we eat affects the brain  ? Explained the role of micronutrients in the body, how much we need, and how to get it      Patient Participation / Response:  Fully participated with the group by sharing personal reflections / insights and openly received / provided feedback with other participants.    Demonstrated understanding of topics discussed through group discussion and participation and Identified / Expressed personal readiness to practice skills    Treatment Plan:  Patient has a current master individualized treatment plan.  See Epic treatment plan for more information.    Dary Rondon RN

## 2022-01-12 NOTE — GROUP NOTE
Psychoeducation Group Note    PATIENT'S NAME: Nora Zamorano  MRN:   [id number]  :   1970  ACCT. NUMBER: [id number]  DATE OF SERVICE: 22  START TIME:  2:00 PM  END TIME:  2:50 PM  FACILITATOR: Taras Devi OTR/L  TOPIC: MH Life Skills Group: Resiliency Development                                    Service Modality:  Video Visit     Telemedicine Visit: The patient's condition can be safely assessed and treated via synchronous audio and visual telemedicine encounter.      Reason for Telemedicine Visit: Services only offered telehealth    Originating Site (Patient Location): Patient's home    Distant Site (Provider Location): Provider Remote Setting- Home Office    Consent:  The patient/guardian has verbally consented to: the potential risks and benefits of telemedicine (video visit) versus in person care; bill my insurance or make self-payment for services provided; and responsibility for payment of non-covered service    Mode of Communication:  Video Conference via Medical Zoom    As the provider I attest to compliance with applicable laws and regulations related to telemedicine.       [facility] Adult Mental Health Day Treatment  TRACK: 1B    NUMBER OF PARTICIPANTS: 8    Summary of Group / Topics Discussed:  Resiliency Development:  Coping Skills(Spiritual Wellness): Patients were taught how to identify stressors, signs of stress, coping skills, and prevention strategies for overall stress management.  Patients were given the opportunity to identify both ongoing and acute mental health symptoms and how to effectively manage these symptoms by developing an effective aftercare plan.  Patients increased awareness of community based resources.    Patient Session Goals / Objectives:    Identified how using coping skills can be used for symptom and stress management       Improved awareness of individualed symptoms and stressors and how to effectively cope     Established a relapse prevention  plan to practice these skills in their own environments    Practiced and reflected on how to generalize taught skills to their everyday life        Patient Participation / Response:  Fully participated with the group by sharing personal reflections / insights and openly received / provided feedback with other participants.    Demonstrated understanding of content through video/handout/discussion , Verbalized understanding of content and Patient would benefit from additional opportunities to practice the content to be able to generalize it to their everyday life with increased intentionality, consistency, and efficacy in support of their psychiatric recovery    Treatment Plan:  Patient has a current master individualized treatment plan.  See Epic treatment plan for more information.    Taras Devi, OTR/L

## 2022-01-12 NOTE — GROUP NOTE
Process Group Note    PATIENT'S NAME: Nora Zamorano  MRN:   [id number]  :   1970  ACCT. NUMBER: [id number]  DATE OF SERVICE: 22  START TIME:  1:00 PM  END TIME:  1:50 PM  FACILITATOR: Lindy Elizondo [facility]  TOPIC:  Process Group    Diagnoses:  309.81 (F43.10) Posttraumatic Stress Disorder    4. Other Diagnoses that is relevant to services:   296.32 (F33.1) Major Depressive Disorder, Recurrent Episode, Moderate; 300.02 (F41.1) Generalized Anxiety Disorder      [facility] Adult Dual Diagnosis Day Treatment  TRACK: 1B    NUMBER OF PARTICIPANTS: 6                                      Service Modality:  Video Visit     Telemedicine Visit: The patient's condition can be safely assessed and treated via synchronous audio and visual telemedicine encounter.      Reason for Telemedicine Visit: Services only offered telehealth    Originating Site (Patient Location): Patient's home    Distant Site (Provider Location): Provider Remote Setting- Home Office    Consent:  The patient/guardian has verbally consented to: the potential risks and benefits of telemedicine (video visit) versus in person care; bill my insurance or make self-payment for services provided; and responsibility for payment of non-covered services.     Patient would like the video invitation sent by:  My Chart    Mode of Communication:  Video Conference via Medical Zoom    As the provider I attest to compliance with applicable laws and regulations related to telemedicine.                Data:    Session content: At the start of this group, patients were invited to check in by identifying themselves, describing their current emotional status, and identifying issues to address in this group.   Area(s) of treatment focus addressed in this session included Symptom Management and Personal Safety.    Nora Irving reported feeling distracted and anxious about something today.  Her goal for the day is to be present in group. Patient declined additional  process time but contributed to group discussion and provided feedback and support to peers.      Therapeutic Interventions/Treatment Strategies:  Psychotherapist offered support, feedback and validation and reinforced use of skills.    Assessment:    Patient response:   Patient responded to session by accepting feedback, giving feedback and listening    Possible barriers to participation / learning include: and no barriers identified    Health Issues:   None reported       Substance Use Review:   Substance Use: cannabis .     Mental Status/Behavioral Observations  Appearance:   Appropriate   Eye Contact:   Good   Psychomotor Behavior: Normal   Attitude:   Cooperative   Orientation:   All  Speech   Rate / Production: Normal    Volume:  Normal   Mood:    Anxious  Depressed   Affect:    Appropriate   Thought Content:   Clear  Thought Form:  Coherent  Logical     Insight:    Good     Plan:     Safety Plan: No current safety concerns identified.  Recommended that patient call 911 or go to the local ED should there be a change in any of these risk factors.     Barriers to treatment: None identified    Patient Contracts (see media tab):  None    Substance Use: Provided encouragement towards sobriety    Provided support and affirmation for steps taken towards sobriety      Continue or Discharge: Patient will continue in Adult Day Treatment (ADT)  as planned. Patient is likely to benefit from learning and using skills as they work toward the goals identified in their treatment plan.      Lindy Elizondo, [facility]  January 12, 2022

## 2022-01-13 ENCOUNTER — HOSPITAL ENCOUNTER (OUTPATIENT)
Dept: BEHAVIORAL HEALTH | Facility: CLINIC | Age: 52
End: 2022-01-13
Attending: PSYCHIATRY & NEUROLOGY
Payer: MEDICARE

## 2022-01-13 PROCEDURE — 90853 GROUP PSYCHOTHERAPY: CPT | Mod: GT | Performed by: COUNSELOR

## 2022-01-13 PROCEDURE — G0177 OPPS/PHP; TRAIN & EDUC SERV: HCPCS | Mod: GT

## 2022-01-13 PROCEDURE — 90853 GROUP PSYCHOTHERAPY: CPT | Mod: PO | Performed by: COUNSELOR

## 2022-01-13 NOTE — GROUP NOTE
Psychoeducation Group Note    PATIENT'S NAME: Nora Zamorano  MRN:   [id number]  :   1970  ACCT. NUMBER: [id number]  DATE OF SERVICE: 22  START TIME:  2:00 PM  END TIME:  2:50 PM  FACILITATOR: Taras Devi OTR/L  TOPIC: MH Life Skills Group: Resiliency Development                                    Service Modality:  Video Visit     Telemedicine Visit: The patient's condition can be safely assessed and treated via synchronous audio and visual telemedicine encounter.      Reason for Telemedicine Visit: Services only offered telehealth    Originating Site (Patient Location): Patient's home    Distant Site (Provider Location): Provider Remote Setting- Home Office    Consent:  The patient/guardian has verbally consented to: the potential risks and benefits of telemedicine (video visit) versus in person care; bill my insurance or make self-payment for services provided; and responsibility for payment of non-covered services.     Mode of Communication:  Video Conference via Medical Zoom    As the provider I attest to compliance with applicable laws and regulations related to telemedicine.       [facility] Adult Mental Health Day Treatment  TRACK: 1B    NUMBER OF PARTICIPANTS: 6    Summary of Group / Topics Discussed:  Resiliency Development:  Coping Skills: Personal Recovery Outcome Measure: Patients were taught how to identify coping strategies and routines that they can adopt and use for management with focus on a balance between physical, emotional, social and spiritual strategies.    Patient Session Goals / Objectives:    Identified personal definitions of recovery for effectively managing both mental health and substance abuse/abuse symptoms     Improved awareness of the process of recovery and skills and strategies that support this       Established a plan for practice of these skills in their own environments    Practiced and reflected on how to generalize taught skills to their everyday  life      Patient Participation / Response:  Fully participated with the group by sharing personal reflections / insights and openly received / provided feedback with other participants.    Demonstrated understanding of content through handouts/discussion , Verbalized understanding of content and Patient would benefit from additional opportunities to practice the content to be able to generalize it to their everyday life with increased intentionality, consistency, and efficacy in support of their psychiatric recovery    Treatment Plan:  Patient has a current master individualized treatment plan.  See Epic treatment plan for more information.    Taras Devi, OTR/L

## 2022-01-13 NOTE — GROUP NOTE
Psychotherapy Group Note    PATIENT'S NAME: Nora Zamorano  MRN:   [id number]  :   1970  ACCT. NUMBER: [id number]  DATE OF SERVICE: 22  START TIME:  3:00 PM  END TIME:  3:50 PM  FACILITATOR: Lindy Elizondo LP[facility]  TOPIC: MH EBP Group: Coping Skills  [facility] Adult Mental Health Day Treatment  TRACK: 1B    NUMBER OF PARTICIPANTS: 6                                      Service Modality:  Video Visit     Telemedicine Visit: The patient's condition can be safely assessed and treated via synchronous audio and visual telemedicine encounter.      Reason for Telemedicine Visit: Services only offered telehealth    Originating Site (Patient Location): Patient's home    Distant Site (Provider Location): Provider Remote Setting- Home Office    Consent:  The patient/guardian has verbally consented to: the potential risks and benefits of telemedicine (video visit) versus in person care; bill my insurance or make self-payment for services provided; and responsibility for payment of non-covered services.     Patient would like the video invitation sent by:  My Chart    Mode of Communication:  Video Conference via Medical Zoom    As the provider I attest to compliance with applicable laws and regulations related to telemedicine.          Summary of Group / Topics Discussed:  Coping Skills: Radical Acceptance: Patients learned radical acceptance as a way to tolerate heightened stress in the moment.  Patients identified situations that necessitate radical acceptance.  They focused on applying acceptance of the moment to tolerate difficult emotions and events. Patients discussed how to distinguish when this can be useful in their lives and when other skills are more relevant or helpful.    Patient Session Goals / Objectives:    Understand that some amount of pain exists for each of us in our lives    Process the difficulty of acceptance in our lives and benefits of radical acceptance to mood and  functioning.    Demonstrate understanding of when to use the radical acceptance to tolerate distress by providing examples of situations where this could be applied.    Identify barriers to applying radical acceptance to difficult situations and explore strategies to overcome them        Patient Participation / Response:  Fully participated with the group by sharing personal reflections / insights and openly received / provided feedback with other participants.    Demonstrated understanding of topics discussed through group discussion and participation, Expressed understanding of the relevance / importance of coping skills at distressing times in life and Demonstrated knowledge of when to consider using a variety of coping skills in daily life    Treatment Plan:  Patient has a current master individualized treatment plan.  See Epic treatment plan for more information.    Lindy Elizondo, [facility]

## 2022-01-13 NOTE — GROUP NOTE
"Process Group Note    PATIENT'S NAME: Nora Zamorano  MRN:   [id number]  :   1970  ACCT. NUMBER: [id number]  DATE OF SERVICE: 22  START TIME:  1:00 PM  END TIME:  1:50 PM  FACILITATOR: Lindy Elizondo [facility]  TOPIC:  Process Group    Diagnoses:  309.81 (F43.10) Posttraumatic Stress Disorder    4. Other Diagnoses that is relevant to services:   296.32 (F33.1) Major Depressive Disorder, Recurrent Episode, Moderate; 300.02 (F41.1) Generalized Anxiety Disorder      [facility] Mental Health Day Treatment  TRACK: 1B    NUMBER OF PARTICIPANTS: 5                                      Service Modality:  Video Visit     Telemedicine Visit: The patient's condition can be safely assessed and treated via synchronous audio and visual telemedicine encounter.      Reason for Telemedicine Visit: Services only offered telehealth    Originating Site (Patient Location): Patient's home    Distant Site (Provider Location): Provider Remote Setting- Home Office    Consent:  The patient/guardian has verbally consented to: the potential risks and benefits of telemedicine (video visit) versus in person care; bill my insurance or make self-payment for services provided; and responsibility for payment of non-covered services.     Patient would like the video invitation sent by:  My Chart    Mode of Communication:  Video Conference via Medical Zoom    As the provider I attest to compliance with applicable laws and regulations related to telemedicine.                Data:    Session content: At the start of this group, patients were invited to check in by identifying themselves, describing their current emotional status, and identifying issues to address in this group.   Area(s) of treatment focus addressed in this session included Symptom Management and Personal Safety.    Nora Irving reported feeling \"tired but pretty good\" today.  Her goal for day is to run an errand that she has been procrastinating. Patient declined " additional process time but contributed to group discussion and provided feedback and support to peers.      Therapeutic Interventions/Treatment Strategies:  Psychotherapist offered support, feedback and validation and reinforced use of skills.    Assessment:    Patient response:   Patient responded to session by accepting feedback, giving feedback and listening    Possible barriers to participation / learning include: and no barriers identified    Health Issues:   None reported       Substance Use Review:   Substance Use: cannabis .     Mental Status/Behavioral Observations  Appearance:   Appropriate   Eye Contact:   Good   Psychomotor Behavior: Normal   Attitude:   Cooperative   Orientation:   All  Speech   Rate / Production: Normal    Volume:  Normal   Mood:    Anxious  Depressed   Affect:    Appropriate   Thought Content:   Clear  Thought Form:  Coherent  Logical     Insight:    Good     Plan:     Safety Plan: No current safety concerns identified.  Recommended that patient call 911 or go to the local ED should there be a change in any of these risk factors.     Barriers to treatment: None identified    Patient Contracts (see media tab):  None    Substance Use: Not addressed in session     Continue or Discharge: Patient will continue in Adult Day Treatment (ADT)  as planned. Patient is likely to benefit from learning and using skills as they work toward the goals identified in their treatment plan.      Lindy Elizondo, [facility]  January 13, 2022

## 2022-01-17 ENCOUNTER — HOSPITAL ENCOUNTER (OUTPATIENT)
Dept: BEHAVIORAL HEALTH | Facility: CLINIC | Age: 52
End: 2022-01-17
Attending: PSYCHIATRY & NEUROLOGY
Payer: MEDICARE

## 2022-01-17 PROCEDURE — 90853 GROUP PSYCHOTHERAPY: CPT | Mod: PO | Performed by: COUNSELOR

## 2022-01-17 PROCEDURE — G0177 OPPS/PHP; TRAIN & EDUC SERV: HCPCS | Mod: PO

## 2022-01-17 ASSESSMENT — ANXIETY QUESTIONNAIRES
7. FEELING AFRAID AS IF SOMETHING AWFUL MIGHT HAPPEN: MORE THAN HALF THE DAYS
3. WORRYING TOO MUCH ABOUT DIFFERENT THINGS: MORE THAN HALF THE DAYS
GAD7 TOTAL SCORE: 13
6. BECOMING EASILY ANNOYED OR IRRITABLE: MORE THAN HALF THE DAYS
GAD7 TOTAL SCORE: 13
4. TROUBLE RELAXING: SEVERAL DAYS
2. NOT BEING ABLE TO STOP OR CONTROL WORRYING: NEARLY EVERY DAY
1. FEELING NERVOUS, ANXIOUS, OR ON EDGE: NEARLY EVERY DAY
GAD7 TOTAL SCORE: 13
7. FEELING AFRAID AS IF SOMETHING AWFUL MIGHT HAPPEN: MORE THAN HALF THE DAYS
7. FEELING AFRAID AS IF SOMETHING AWFUL MIGHT HAPPEN: MORE THAN HALF THE DAYS
6. BECOMING EASILY ANNOYED OR IRRITABLE: MORE THAN HALF THE DAYS
5. BEING SO RESTLESS THAT IT IS HARD TO SIT STILL: NOT AT ALL
3. WORRYING TOO MUCH ABOUT DIFFERENT THINGS: MORE THAN HALF THE DAYS
GAD7 TOTAL SCORE: 13
5. BEING SO RESTLESS THAT IT IS HARD TO SIT STILL: NOT AT ALL
GAD7 TOTAL SCORE: 13
4. TROUBLE RELAXING: SEVERAL DAYS
1. FEELING NERVOUS, ANXIOUS, OR ON EDGE: NEARLY EVERY DAY
GAD7 TOTAL SCORE: 13
7. FEELING AFRAID AS IF SOMETHING AWFUL MIGHT HAPPEN: MORE THAN HALF THE DAYS
2. NOT BEING ABLE TO STOP OR CONTROL WORRYING: NEARLY EVERY DAY

## 2022-01-17 NOTE — GROUP NOTE
Psychoeducation Group Note    PATIENT'S NAME: Nora Zamorano  MRN:   [id number]  :   1970  ACCT. NUMBER: [id number]  DATE OF SERVICE: 22  START TIME:  3:00 PM  END TIME:  3:50 PM  FACILITATOR: Dary Rondon, ANDREA  TOPIC: LAY RN Group: [facility]                                    Service Modality:  Video Visit     Telemedicine Visit: The patient's condition can be safely assessed and treated via synchronous audio and visual telemedicine encounter.      Reason for Telemedicine Visit:  covid19    Originating Site (Patient Location): Patient's home    Distant Site (Provider Location): Provider Remote Setting- Home Office    Consent:  The patient/guardian has verbally consented to: the potential risks and benefits of telemedicine (video visit) versus in person care; bill my insurance or make self-payment for services provided; and responsibility for payment of non-covered services.     Patient would like the video invitation sent by:  My Chart    Mode of Communication:  Video Conference via Medical Zoom    As the provider I attest to compliance with applicable laws and regulations related to telemedicine.          [facility] Adult Mental Health Day Treatment  TRACK: 1B + 1 6B pt merger    NUMBER OF PARTICIPANTS: 8    Summary of Group / Topics Discussed:  Foundations of Health: [facility]: Nutrition: Gut-Brain connection: This group was designed to complement the Functional nutrition group and build upon previous education. The changes that food makes on the brain (how ) and nutrition as it relates to mental health was also discussed.       Patient Session Goals / Objectives:  ? Identified the health enhancing benefits to good nutrition  ? Verbalized ways in which the food we eat affects the brain  ? Explained the role of the vagus nerve in the body, as well as gut microbiome health        Patient Participation / Response:  Fully participated with the group by sharing personal  reflections / insights and openly received / provided feedback with other participants.    Demonstrated understanding of topics discussed through group discussion and participation    Treatment Plan:  Patient has a current master individualized treatment plan.  See Epic treatment plan for more information.    Dary Rondon RN

## 2022-01-17 NOTE — GROUP NOTE
Psychoeducation Group Note    PATIENT'S NAME: Nora Zamorano  MRN:   [id number]  :   1970  ACCT. NUMBER: [id number]  DATE OF SERVICE: 22  START TIME:  2:00 PM  END TIME:  2:50 PM  FACILITATOR: Taras Devi OTR/L  TOPIC: MH Life Skills Group: Resiliency Development                                    Service Modality:  Video Visit     Telemedicine Visit: The patient's condition can be safely assessed and treated via synchronous audio and visual telemedicine encounter.      Reason for Telemedicine Visit: Services only offered telehealth    Originating Site (Patient Location): Patient's home    Distant Site (Provider Location): Provider Remote Setting- Home Office    Consent:  The patient/guardian has verbally consented to: the potential risks and benefits of telemedicine (video visit) versus in person care; bill my insurance or make self-payment for services provided; and responsibility for payment of non-covered services.    Mode of Communication:  Video Conference via Medical Zoom    As the provider I attest to compliance with applicable laws and regulations related to telemedicine.       [facility] Adult Mental Health Day Treatment  TRACK: 1B    NUMBER OF PARTICIPANTS: 7    Summary of Group / Topics Discussed:  Resiliency Development:  Coping Skills( Self-Compassion): Patients were taught how to identify stressors, signs of stress, coping skills, and prevention strategies for overall stress management.  Patients were given the opportunity to identify both ongoing and acute mental health symptoms and how to effectively manage these symptoms by developing an effective aftercare plan.  Patients increased awareness of community based resources.    Patient Session Goals / Objectives:    Identified how using coping skills can be used for symptom and stress management       Improved awareness of individualed symptoms and stressors and how to effectively cope     Established a relapse prevention  plan to practice these skills in their own environments    Practiced and reflected on how to generalize taught skills to their everyday life    Practice Self-Compassion        Patient Participation / Response:  Fully participated with the group by sharing personal reflections / insights and openly received / provided feedback with other participants.    Patient presentation: Tearful,anxious,distressed, Demonstrated understanding of content through video/handouts/discussion , Verbalized understanding of content and Patient would benefit from additional opportunities to practice the content to be able to generalize it to their everyday life with increased intentionality, consistency, and efficacy in support of their psychiatric recovery    Treatment Plan:  Patient has a current master individualized treatment plan.  See Epic treatment plan for more information.    Taras Devi, OTR/L

## 2022-01-17 NOTE — GROUP NOTE
"Process Group Note    PATIENT'S NAME: Nora Zamorano  MRN:   [id number]  :   1970  ACCT. NUMBER: [id number]  DATE OF SERVICE: 22  START TIME:  1:00 PM  END TIME:  1:50 PM  FACILITATOR: Lindy Elizondo [facility]  TOPIC:  Process Group    Diagnoses:  309.81 (F43.10) Posttraumatic Stress Disorder    4. Other Diagnoses that is relevant to services:   296.32 (F33.1) Major Depressive Disorder, Recurrent Episode, Moderate; 300.02 (F41.1) Generalized Anxiety Disorder      [facility] Mental Health Day Treatment  TRACK: 1B    NUMBER OF PARTICIPANTS: 6                                      Service Modality:  Video Visit     Telemedicine Visit: The patient's condition can be safely assessed and treated via synchronous audio and visual telemedicine encounter.      Reason for Telemedicine Visit: Services only offered telehealth    Originating Site (Patient Location): Patient's home    Distant Site (Provider Location): Provider Remote Setting- Home Office    Consent:  The patient/guardian has verbally consented to: the potential risks and benefits of telemedicine (video visit) versus in person care; bill my insurance or make self-payment for services provided; and responsibility for payment of non-covered services.     Patient would like the video invitation sent by:  My Chart    Mode of Communication:  Video Conference via Medical Zoom    As the provider I attest to compliance with applicable laws and regulations related to telemedicine.                Data:    Session content: At the start of this group, patients were invited to check in by identifying themselves, describing their current emotional status, and identifying issues to address in this group.   Area(s) of treatment focus addressed in this session included Symptom Management and Personal Safety.    Nora Irving reported feeling \"blah\" and \"flat\" today.  Her goal for the day is to do some self-care that needs to be addressed.  Patient declined " additional process time but contributed to group discussion and provided feedback and support to peers.      Therapeutic Interventions/Treatment Strategies:  Psychotherapist offered support, feedback and validation and reinforced use of skills.    Assessment:    Patient response:   Patient responded to session by accepting feedback, giving feedback and listening    Possible barriers to participation / learning include: and no barriers identified    Health Issues:   None reported       Substance Use Review:   Substance Use: No active concerns identified.    Mental Status/Behavioral Observations  Appearance:   Appropriate   Eye Contact:   Good   Psychomotor Behavior: Normal   Attitude:   Cooperative   Orientation:   All  Speech   Rate / Production: Normal    Volume:  Normal   Mood:    Depressed   Affect:    Flat   Thought Content:   Clear  Thought Form:  Coherent  Logical     Insight:    Good     Plan:     Safety Plan: No current safety concerns identified.  Recommended that patient call 911 or go to the local ED should there be a change in any of these risk factors.     Barriers to treatment: None identified    Patient Contracts (see media tab):  None    Substance Use: Not addressed in session     Continue or Discharge: Patient will continue in Adult Day Treatment (ADT)  as planned. Patient is likely to benefit from learning and using skills as they work toward the goals identified in their treatment plan.      Lindy Elizondo, [facility]  January 17, 2022

## 2022-01-18 ASSESSMENT — ANXIETY QUESTIONNAIRES
GAD7 TOTAL SCORE: 13
GAD7 TOTAL SCORE: 13

## 2022-01-20 ENCOUNTER — TELEPHONE (OUTPATIENT)
Dept: BEHAVIORAL HEALTH | Facility: CLINIC | Age: 52
End: 2022-01-20
Payer: MEDICARE

## 2022-01-20 NOTE — TELEPHONE ENCOUNTER
Writer called client to explain their absence from group today.  Writer left them a voicemail asking that they return writer's call.    Lindy Elizondo [facility] on 1/20/2022 at 4:01 PM

## 2022-01-24 ENCOUNTER — HOSPITAL ENCOUNTER (OUTPATIENT)
Dept: BEHAVIORAL HEALTH | Facility: CLINIC | Age: 52
End: 2022-01-24
Attending: PSYCHIATRY & NEUROLOGY
Payer: MEDICARE

## 2022-01-24 PROCEDURE — 90853 GROUP PSYCHOTHERAPY: CPT | Mod: GT | Performed by: COUNSELOR

## 2022-01-24 PROCEDURE — G0177 OPPS/PHP; TRAIN & EDUC SERV: HCPCS | Mod: GT

## 2022-01-24 NOTE — GROUP NOTE
Process Group Note    PATIENT'S NAME: Nora Zamorano  MRN:   [id number]  :   1970  ACCT. NUMBER: [id number]  DATE OF SERVICE: 22  START TIME:  1:00 PM  END TIME:  1:50 PM  FACILITATOR: Lindy Elizondo [facility]  TOPIC:  Process Group    Diagnoses:  309.81 (F43.10) Posttraumatic Stress Disorder    4. Other Diagnoses that is relevant to services:   296.32 (F33.1) Major Depressive Disorder, Recurrent Episode, Moderate; 300.02 (F41.1) Generalized Anxiety Disorder      [facility] Mental Health Day Treatment  TRACK: 1B    NUMBER OF PARTICIPANTS:6                                      Service Modality:  Video Visit     Telemedicine Visit: The patient's condition can be safely assessed and treated via synchronous audio and visual telemedicine encounter.      Reason for Telemedicine Visit: Services only offered telehealth    Originating Site (Patient Location): Patient's home    Distant Site (Provider Location): Provider Remote Setting- Home Office    Consent:  The patient/guardian has verbally consented to: the potential risks and benefits of telemedicine (video visit) versus in person care; bill my insurance or make self-payment for services provided; and responsibility for payment of non-covered services.     Patient would like the video invitation sent by:  My Chart    Mode of Communication:  Video Conference via Medical Zoom    As the provider I attest to compliance with applicable laws and regulations related to telemedicine.                Data:    Session content: At the start of this group, patients were invited to check in by identifying themselves, describing their current emotional status, and identifying issues to address in this group.   Area(s) of treatment focus addressed in this session included Symptom Management and Personal Safety.    Nora Irving reported feeling anxious.  Her goal was to make it to group.  He contracted COVID-19 last week and has been feeling very unwell.       Therapeutic Interventions/Treatment Strategies:  Psychotherapist offered support, feedback and validation and reinforced use of skills.    Assessment:    Patient response:   Patient responded to session by accepting feedback, giving feedback and listening    Possible barriers to participation / learning include: and no barriers identified    Health Issues:   None reported       Substance Use Review:   Substance Use: cannabis .     Mental Status/Behavioral Observations  Appearance:   Appropriate   Eye Contact:   Good   Psychomotor Behavior: Normal   Attitude:   Cooperative   Orientation:   All  Speech   Rate / Production: Normal    Volume:  Normal   Mood:    Anxious   Affect:    Appropriate   Thought Content:   Clear  Thought Form:  Coherent  Logical     Insight:    Good     Plan:     Safety Plan: No current safety concerns identified.  Recommended that patient call 911 or go to the local ED should there be a change in any of these risk factors.     Barriers to treatment: None identified    Patient Contracts (see media tab):  None    Substance Use: Provided encouragement towards sobriety    Provided support and affirmation for steps taken towards sobriety      Continue or Discharge: Patient will continue in Adult Day Treatment (ADT)  as planned. Patient is likely to benefit from learning and using skills as they work toward the goals identified in their treatment plan.      Lindy Elizondo, [facility]  January 24, 2022

## 2022-01-24 NOTE — GROUP NOTE
Psychoeducation Group Note    PATIENT'S NAME: Nora Zamorano  MRN:   [id number]  :   1970  ACCT. NUMBER: [id number]  DATE OF SERVICE: 22  START TIME:  2:00 PM  END TIME:  2:50 PM  FACILITATOR: Taras Devi OTR/L  TOPIC: MH Life Skills Group: Resiliency Development                                    Service Modality:  Video Visit     Telemedicine Visit: The patient's condition can be safely assessed and treated via synchronous audio and visual telemedicine encounter.      Reason for Telemedicine Visit: Services only offered telehealth    Originating Site (Patient Location): Patient's home    Distant Site (Provider Location): Provider Remote Setting- Home Office    Consent:  The patient/guardian has verbally consented to: the potential risks and benefits of telemedicine (video visit) versus in person care; bill my insurance or make self-payment for services provided; and responsibility for payment of non-covered services.    Mode of Communication:  Video Conference via Medical Zoom    As the provider I attest to compliance with applicable laws and regulations related to telemedicine.       [facility] Adult Mental Health Day Treatment  TRACK: 1B    NUMBER OF PARTICIPANTS: 7    Summary of Group / Topics Discussed:  Resiliency Development:  Coping Skills(Conflict Management): Patients were taught how to identify stressors, signs of stress, coping skills, and prevention strategies for overall stress management.  Patients were given the opportunity to identify both ongoing and acute mental health symptoms and how to effectively manage these symptoms by developing an effective aftercare plan.  Patients increased awareness of community based resources.    Patient Session Goals / Objectives:    Identified how using coping skills can be used for symptom and stress management       Improved awareness of individualed symptoms and stressors and how to effectively cope     Established a relapse  prevention plan to practice these skills in their own environments    Practiced and reflected on how to generalize taught skills to their everyday life        Patient Participation / Response:  Fully participated with the group by sharing personal reflections / insights and openly received / provided feedback with other participants.    Demonstrated understanding of content through  handout/group discussion , Verbalized understanding of content and Patient would benefit from additional opportunities to practice the content to be able to generalize it to their everyday life with increased intentionality, consistency, and efficacy in support of their psychiatric recovery    Treatment Plan:  Patient has a current master individualized treatment plan.  See Epic treatment plan for more information.    Taras Devi, OTR/L

## 2022-01-24 NOTE — GROUP NOTE
Psychoeducation Group Note    PATIENT'S NAME: Nora Zamorano  MRN:   [id number]  :   1970  ACCT. NUMBER: [id number]  DATE OF SERVICE: 22  START TIME:  3:00 PM  END TIME:  3:50 PM  FACILITATOR: Dary Rondon RN  TOPIC: LAY RN Group: Health Maintenance                                    Service Modality:  Video Visit     Telemedicine Visit: The patient's condition can be safely assessed and treated via synchronous audio and visual telemedicine encounter.      Reason for Telemedicine Visit:  covid19    Originating Site (Patient Location): Patient's home    Distant Site (Provider Location): Provider Remote Setting- Home Office    Consent:  The patient/guardian has verbally consented to: the potential risks and benefits of telemedicine (video visit) versus in person care; bill my insurance or make self-payment for services provided; and responsibility for payment of non-covered services.     Patient would like the video invitation sent by:  My Chart    Mode of Communication:  Video Conference via Medical Zoom    As the provider I attest to compliance with applicable laws and regulations related to telemedicine.          [facility] Adult Mental Health Day Treatment  TRACK: 1B    NUMBER OF PARTICIPANTS: 7    Summary of Group / Topics Discussed:  Health Maintenance: Discharge planning/Community resources: Patients worked on completing an instructor-facilitated discharge planning activity. Discharge planning begins for all patients after admission. Competent discharge planning promotes a successful transition and decreases the likelihood of mental health relapse. In this group, all dimensions of wellness were reviewed to assess for needs/discharge readiness. These dimensions included: physical, emotional, occupational/productivity, environmental, social, spiritual, intellectual, and financial. Patients worked on completing/updating their discharge planning and identifying their treatment needs prior to  time of discharge.     Patient Session Goals / Objectives:  ? Identified unmet treatment needs to accomplish before discharge  ? Completed all dimensions of the discharge planning packet  ? Participated in the planning process, make phone calls, set up appointments, got connected with community resources, followed up with treatment team as needed         Patient Participation / Response:  Fully participated with the group by sharing personal reflections / insights and openly received / provided feedback with other participants.    Demonstrated understanding of topics discussed through group discussion and participation and Identified / Expressed personal readiness to practice skills    Treatment Plan:  Patient has a current master individualized treatment plan.  See Epic treatment plan for more information.    Dary Rondon RN

## 2022-01-26 ENCOUNTER — HOSPITAL ENCOUNTER (OUTPATIENT)
Dept: BEHAVIORAL HEALTH | Facility: CLINIC | Age: 52
End: 2022-01-26
Attending: PSYCHIATRY & NEUROLOGY
Payer: MEDICARE

## 2022-01-26 PROCEDURE — G0177 OPPS/PHP; TRAIN & EDUC SERV: HCPCS | Mod: GT

## 2022-01-26 PROCEDURE — 90853 GROUP PSYCHOTHERAPY: CPT | Mod: GT | Performed by: COUNSELOR

## 2022-01-26 ASSESSMENT — PATIENT HEALTH QUESTIONNAIRE - PHQ9
10. IF YOU CHECKED OFF ANY PROBLEMS, HOW DIFFICULT HAVE THESE PROBLEMS MADE IT FOR YOU TO DO YOUR WORK, TAKE CARE OF THINGS AT HOME, OR GET ALONG WITH OTHER PEOPLE: SOMEWHAT DIFFICULT
SUM OF ALL RESPONSES TO PHQ QUESTIONS 1-9: 7
SUM OF ALL RESPONSES TO PHQ QUESTIONS 1-9: 7
10. IF YOU CHECKED OFF ANY PROBLEMS, HOW DIFFICULT HAVE THESE PROBLEMS MADE IT FOR YOU TO DO YOUR WORK, TAKE CARE OF THINGS AT HOME, OR GET ALONG WITH OTHER PEOPLE: SOMEWHAT DIFFICULT
SUM OF ALL RESPONSES TO PHQ QUESTIONS 1-9: 7
SUM OF ALL RESPONSES TO PHQ QUESTIONS 1-9: 7

## 2022-01-26 NOTE — GROUP NOTE
Psychoeducation Group Note    PATIENT'S NAME: Nora Zamorano  MRN:   [id number]  :   1970  ACCT. NUMBER: [id number]  DATE OF SERVICE: 22  START TIME:  2:00 PM  END TIME:  2:50 PM  FACILITATOR: Taras Devi OTR/L  TOPIC: MH Life Skills Group: Communication and Social Skills Development                                    Service Modality:  Video Visit     Telemedicine Visit: The patient's condition can be safely assessed and treated via synchronous audio and visual telemedicine encounter.      Reason for Telemedicine Visit: Services only offered telehealth    Originating Site (Patient Location): Patient's home    Distant Site (Provider Location): Provider Remote Setting- Home Office    Consent:  The patient/guardian has verbally consented to: the potential risks and benefits of telemedicine (video visit) versus in person care; bill my insurance or make self-payment for services provided; and responsibility for payment of non-covered services.    Mode of Communication:  Video Conference via Medical Zoom    As the provider I attest to compliance with applicable laws and regulations related to telemedicine.       [facility] Adult Mental Health Day Treatment  TRACK: 1B    NUMBER OF PARTICIPANTS: 7    Summary of Group / Topics Discussed:  Communication and Social Skills Development: Listening Skills: {Patients were taught and gained awareness into personal barriers to effective listening and how this can negatively impact relationships.  Patients were taught skills and practiced how to improve communication with others by becoming an active listener.  Patients identified both personal strengths and opportunities for growth in this area to improve overall communication and connection with other people as a way to build meaningful relationships to combat mental health and substance use/abuse symptoms.      Patient Session Goals / Objectives:    Identified importance of active listening skills in  effective communication and how this impacts their ability to communicate clearly with other people       Improved awareness of important aspects of listening skills and how this relates to mental health recovery        Established a plan for practice of these skills in their own environments    Practiced and reflected on how to generalize taught skills to their everyday life      Patient Participation / Response:  Fully participated with the group by sharing personal reflections / insights and openly received / provided feedback with other participants.    Demonstrated understanding of content through video/handout/discussion , Verbalized understanding of content and Patient would benefit from additional opportunities to practice the content to be able to generalize it to their everyday life with increased intentionality, consistency, and efficacy in support of their psychiatric recovery    Treatment Plan:  Patient has a current master individualized treatment plan.  See Epic treatment plan for more information.    Taras Devi, OTR/L

## 2022-01-26 NOTE — GROUP NOTE
"Process Group Note    PATIENT'S NAME: Nora Zamorano  MRN:   [id number]  :   1970  ACCT. NUMBER: [id number]  DATE OF SERVICE: 22  START TIME:  1:00 PM  END TIME:  1:50 PM  FACILITATOR: Lindy Elizondo [facility]  TOPIC:  Process Group    Diagnoses:  309.81 (F43.10) Posttraumatic Stress Disorder    4. Other Diagnoses that is relevant to services:   296.32 (F33.1) Major Depressive Disorder, Recurrent Episode, Moderate; 300.02 (F41.1) Generalized Anxiety Disorder      [facility] Mental Health Day Treatment  TRACK: 1B    NUMBER OF PARTICIPANTS: 5                                      Service Modality:  Video Visit     Telemedicine Visit: The patient's condition can be safely assessed and treated via synchronous audio and visual telemedicine encounter.      Reason for Telemedicine Visit: Services only offered telehealth    Originating Site (Patient Location): Patient's home    Distant Site (Provider Location): Provider Remote Setting- Home Office    Consent:  The patient/guardian has verbally consented to: the potential risks and benefits of telemedicine (video visit) versus in person care; bill my insurance or make self-payment for services provided; and responsibility for payment of non-covered services.     Patient would like the video invitation sent by:  My Chart    Mode of Communication:  Video Conference via Medical Zoom    As the provider I attest to compliance with applicable laws and regulations related to telemedicine.                Data:    Session content: At the start of this group, patients were invited to check in by identifying themselves, describing their current emotional status, and identifying issues to address in this group.   Area(s) of treatment focus addressed in this session included Symptom Management, Personal Safety.    Nora Irving reported feeling \"ampted up\" and her mood is \"pretty good\" today.  Her goal for the day is to get her medications when they are dropped off from " the pharmacy.  Patient declined additional process time but contributed to group discussion and provided feedback and support to peers.      Therapeutic Interventions/Treatment Strategies:  Psychotherapist offered support, feedback and validation and reinforced use of skills.    Assessment:    Patient response:   Patient responded to session by accepting feedback, giving feedback and listening    Possible barriers to participation / learning include: and no barriers identified    Health Issues:   None reported       Substance Use Review:   Substance Use: cannabis .     Mental Status/Behavioral Observations  Appearance:   Appropriate   Eye Contact:   Good   Psychomotor Behavior: Normal   Attitude:   Cooperative   Orientation:   All  Speech   Rate / Production: Normal    Volume:  Normal   Mood:    Normal  Affect:    Appropriate   Thought Content:   Safety reports  presence of suicidal ideation passive suicidal ideation   Thought Form:  Coherent  Logical     Insight:    Good     Plan:     Safety Plan: Committed to safety and agreed to follow previously developed safety coping plan.      Barriers to treatment: None identified    Patient Contracts (see media tab):  None    Substance Use: Not addressed in session     Continue or Discharge: Patient will continue in Adult Day Treatment (ADT)  as planned. Patient is likely to benefit from learning and using skills as they work toward the goals identified in their treatment plan.      Lindy Elizondo, [facility]  January 26, 2022

## 2022-01-27 ENCOUNTER — HOSPITAL ENCOUNTER (OUTPATIENT)
Dept: BEHAVIORAL HEALTH | Facility: CLINIC | Age: 52
End: 2022-01-27
Attending: PSYCHIATRY & NEUROLOGY
Payer: MEDICARE

## 2022-01-27 PROCEDURE — G0177 OPPS/PHP; TRAIN & EDUC SERV: HCPCS | Mod: PO

## 2022-01-27 PROCEDURE — 90853 GROUP PSYCHOTHERAPY: CPT | Mod: GT | Performed by: COUNSELOR

## 2022-01-27 ASSESSMENT — PATIENT HEALTH QUESTIONNAIRE - PHQ9
SUM OF ALL RESPONSES TO PHQ QUESTIONS 1-9: 7
SUM OF ALL RESPONSES TO PHQ QUESTIONS 1-9: 7

## 2022-01-27 NOTE — ADDENDUM NOTE
Encounter addended by: Lindy Elizondo [facility] on: 1/26/2022 9:55 PM   Actions taken: Charge Capture section accepted

## 2022-01-27 NOTE — GROUP NOTE
Psychotherapy Group Note    PATIENT'S NAME: Nora Zamorano  MRN:   [id number]  :   1970  ACCT. NUMBER: [id number]  DATE OF SERVICE: 22  START TIME:  3:00 PM  END TIME:  3:50 PM  FACILITATOR: Kody Cerda LMFT  TOPIC:  EBP Group: Specialty Awareness  [facility] Adult Mental Health Day Treatment  TRACK: 1B    NUMBER OF PARTICIPANTS: 8    Summary of Group / Topics Discussed:  Specialty Topics: Grief/Transitions: Patients received an overview of the grief process.  Patients explored their relationship to loss and how that has affected their mental health symptoms.  Strategies for recognizing loss and ideas for engaging in the grief process was presented and discussed.  Patients identified needs for support and coping skills to manage loss.  The purpose of this specialty topic is to help patients identify the aspects of change due to loss that individuals experience in addition to mental health symptoms to better cope with the grief, loss, and life transitions.      Patient Session Goals / Objectives:    Identified grief, loss, and life transitions     Discussed how grief impacts mental health symptoms and disrupts usual functioning    Identified needs for support and coping with grief and planned further action for coping    Service Modality:  Video Visit     Telemedicine Visit: The patient's condition can be safely assessed and treated via synchronous audio and visual telemedicine encounter.      Reason for Telemedicine Visit: Services only offered telehealth and due to COVID-19.    Originating Site (Patient Location): Patient's home    Distant Site (Provider Location): Provider Remote Setting- Home Office    Consent:  The patient/guardian has verbally consented to: the potential risks and benefits of telemedicine (video visit) versus in person care; bill my insurance or make self-payment for services provided; and responsibility for payment of non-covered services.     Patient would like  the video invitation sent by:  My Chart    Mode of Communication:  Video Conference via Medical Zoom    As the provider I attest to compliance with applicable laws and regulations related to telemedicine.        Patient Participation / Response:  Fully participated with the group by sharing personal reflections / insights and openly received / provided feedback with other participants.    Demonstrated understanding of topics discussed through group discussion and participation, Identified / Expressed readiness to act on skill suggestions discussed in topic, Verbalized understanding of ways to proactively manage illness and Practiced skills in session    Treatment Plan:  Patient has a current master individualized treatment plan.  See Epic treatment plan for more information.    Kody Cerda LMFT

## 2022-01-27 NOTE — GROUP NOTE
"Process Group Note    PATIENT'S NAME: Nora Zamorano  MRN:   [id number]  :   1970  ACCT. NUMBER: [id number]  DATE OF SERVICE: 22  START TIME:  1:00 PM  END TIME:  1:50 PM  FACILITATOR: Lindy Elizondo [facility]  TOPIC:  Process Group    Diagnoses:  309.81 (F43.10) Posttraumatic Stress Disorder    4. Other Diagnoses that is relevant to services:   296.32 (F33.1) Major Depressive Disorder, Recurrent Episode, Moderate; 300.02 (F41.1) Generalized Anxiety Disorder      [facility] Mental Health Day Treatment  TRACK: 1B    NUMBER OF PARTICIPANTS: 6                                      Service Modality:  Video Visit     Telemedicine Visit: The patient's condition can be safely assessed and treated via synchronous audio and visual telemedicine encounter.      Reason for Telemedicine Visit: Services only offered telehealth    Originating Site (Patient Location): Patient's home    Distant Site (Provider Location): Provider Remote Setting- Home Office    Consent:  The patient/guardian has verbally consented to: the potential risks and benefits of telemedicine (video visit) versus in person care; bill my insurance or make self-payment for services provided; and responsibility for payment of non-covered services.     Patient would like the video invitation sent by:  My Chart    Mode of Communication:  Video Conference via Medical Zoom    As the provider I attest to compliance with applicable laws and regulations related to telemedicine.                Data:    Session content: At the start of this group, patients were invited to check in by identifying themselves, describing their current emotional status, and identifying issues to address in this group.   Area(s) of treatment focus addressed in this session included Symptom Management and Personal Safety.    Nora Irving reported feeling \"good\" today.  Her goal today is to color once her coloring materials get delivered.     Therapeutic Interventions/Treatment " Strategies:  Psychotherapist offered support, feedback and validation and reinforced use of skills.    Assessment:    Patient response:   Patient responded to session by accepting feedback, giving feedback and listening    Possible barriers to participation / learning include: and no barriers identified    Health Issues:   None reported       Substance Use Review:   Substance Use: cannabis .     Mental Status/Behavioral Observations  Appearance:   Appropriate   Eye Contact:   Good   Psychomotor Behavior: Normal   Attitude:   Cooperative   Orientation:   All  Speech   Rate / Production: Normal    Volume:  Normal   Mood:    Normal  Affect:    Appropriate   Thought Content:   Clear  Thought Form:  Coherent  Logical     Insight:    Good     Plan:     Safety Plan: No current safety concerns identified.  Recommended that patient call 911 or go to the local ED should there be a change in any of these risk factors.     Barriers to treatment: None identified    Patient Contracts (see media tab):  None    Substance Use: Not addressed in session     Continue or Discharge: Patient will continue in Adult Day Treatment (ADT)  as planned. Patient is likely to benefit from learning and using skills as they work toward the goals identified in their treatment plan.      Lindy Elizondo, [facility]  January 27, 2022

## 2022-01-28 ENCOUNTER — HOSPITAL ENCOUNTER (OUTPATIENT)
Dept: BEHAVIORAL HEALTH | Facility: CLINIC | Age: 52
End: 2022-01-28
Attending: PSYCHIATRY & NEUROLOGY
Payer: MEDICARE

## 2022-01-28 DIAGNOSIS — F41.1 GENERALIZED ANXIETY DISORDER: ICD-10-CM

## 2022-01-28 DIAGNOSIS — F33.1 MODERATE EPISODE OF RECURRENT MAJOR DEPRESSIVE DISORDER (H): ICD-10-CM

## 2022-01-28 DIAGNOSIS — F43.10 POSTTRAUMATIC STRESS DISORDER: Primary | ICD-10-CM

## 2022-01-28 PROCEDURE — 99214 OFFICE O/P EST MOD 30 MIN: CPT | Mod: 95 | Performed by: PSYCHIATRY & NEUROLOGY

## 2022-01-28 RX ORDER — CLONIDINE HYDROCHLORIDE 0.1 MG/1
0.1 TABLET ORAL 3 TIMES DAILY PRN
COMMUNITY
Start: 2022-01-25 | End: 2023-01-03

## 2022-01-28 RX ORDER — TEMAZEPAM 7.5 MG/1
7.5 CAPSULE ORAL
Qty: 30 CAPSULE | Refills: 0 | Status: SHIPPED | OUTPATIENT
Start: 2022-01-28 | End: 2022-05-12

## 2022-01-28 NOTE — PROGRESS NOTES
"[facility]   Adult Mental Health Outpatient Programs  Provider Interval History Note    Program: Adult Day Treatment    Track: 1B    PATIENT'S NAME: Nora Zamorano  MRN:   [id number]  :   1970  ACCT. NUMBER: [id number]  DATE OF SERVICE: 22  CALL/VIDEO START TIME: 1033  CALL/VIDEO END TIME: 1050      Interval History:  \"I have COVID... but my mental health is pretty good.\" Nora Irving presents for today for follow-up and ongoing program supervision. Endorses feeling better following 8 weeks of IOP.    Discussed some medication questions/concerns:    Patient has been taking temazepam for sleep for 5 years, is hoping to taper    Wondering if she can step down to 15 mg or what alternatives might be    Has also been taking quetiapine at bedtime, has managed to taper that effectively    Is no longer taking propranolol or prazosin; found neither of them particularly effective    Has seen benefit from clonidine both in helping her achieve sleep and help with nightmares    Also working on AIR therapy with individual therapist once or twice a week.    Symptoms:    Less hypervigilance    Less dissociation    Substance use:    None endorsed    Reactions/thoughts about program:    Finding it very helpful, feels she is meeting her treatment goals      Risk Assessment:    Suicidal ideation: denies current or recent suicidal ideation or behavior    Thoughts of non-suicidal self-injury: denied    Recent self-injurious behavior: denied    Homicidal ideation: denied    Other safety concerns: denied      Medications:  Current Outpatient Medications   Medication     escitalopram (LEXAPRO) 20 MG tablet     levothyroxine (SYNTHROID/LEVOTHROID) 200 MCG tablet     potassium chloride (KLOR-CON) 20 MEQ packet     QUEtiapine (SEROQUEL) 200 MG tablet     temazepam (RESTORIL) 15 MG capsule     temazepam (RESTORIL) 7.5 MG capsule     triamcinolone (KENALOG) 0.1 % external cream     cloNIDine " "(CATAPRES) 0.1 MG tablet     No current facility-administered medications for this encounter.       The above list was reviewed and updated in EPIC with patient today.     Patient is taking medications as prescribed and denies adverse effects.        Laboratory Results:  Most recent labs reviewed. Pertinent updates/findings: None.     Metrics:  PHQ-9 scores:   PHQ-9 SCORE 12/29/2021 12/29/2021 1/26/2022 1/26/2022   PHQ-9 Total Score - - - -   PHQ-9 Total Score MyChart - 8 (Mild depression) - 7 (Mild depression)   PHQ-9 Total Score 8 8 7 7       JEVON-7 scores:   JEVON-7 SCORE 1/17/2022 1/17/2022 1/17/2022   Total Score - - 13 (moderate anxiety)   Total Score 13 13 13       CSSR-S: No flowsheet data found.      Mental Status Examination (limited due to video virtual visit format):  Vital Signs: There were no vitals taken for this virtual visit.  Appearance: appropriately groomed, appears stated age, and in no apparent distress.  Attitude: cooperative   Eye Contact: good to the extent that can be determined in a video visit  Muscle Strength and Tone: no gross abnormalities based on remote observation  Psychomotor Behavior:  no evidence of tardive dyskinesia, dystonia, or tics based on remote observation  Gait and Station: normal, no gross abnormalities based on remote observation  Speech: clear, coherent, normal prosody, regular rate, regular rhythm and fluent  Associations: No loosening of associations  Thought Process: coherent and goal directed  Thought Content: no evidence of suicidal ideation or homicidal ideation, no evidence of psychotic thought, no auditory hallucinations present and no visual hallucinations present  Mood: \"better\"  Affect: mood congruent, intensity is normal, constricted mobility and reactive  Insight: good  Judgment: intact, adequate for safety  Impulse Control: intact  Oriented to: time, place, person and situation  Attention Span and Concentration: normal  Language: Intact  Recent and Remote " Memory: intact to interview. Not formally assessed. No amnesia.  Fund of Knowledge: appropriate      Diagnosis/es:  296.32 (F33.1) Major Depressive Disorder, Recurrent Episode, Moderate; 300.02 (F41.1) Generalized Anxiety Disorder  309.81 (F43.10) Posttraumatic Stress Disorder        Assessment/Plan:  Nora Irving presents today for follow up. Endorses substantial improvement with current therapies.  Is hoping to taper off temazepam in part to try and achieve healthier sleep.    Discussed that she likely has physiological dependence on this medication after 5 years, and that tapering too quickly will likely lead to interruptions in sleep and possibly worsening symptoms overall as result.  Recommended a very slow taper.  Since medication is available in capsules, they are more difficult to cut, and at the lowest dose  to 7.5 mg.  Will prescribe 30 capsules at that dose, advised patient to reduce dose to 22.5 mg (one 15 mg capsule and one 7.5 mg capsule) for at least 2 weeks or until she feels her sleep is returned to baseline.  She can temporarily increase quetiapine again and or add clonidine back, particularly if the change (improvement) of her sleep architecture on a lower dose of benzodiazepine results in an increase in nightmares, etc.    I am available to consult with her outpatient providers.  We will plan to see again towards the end of program and discussed that she can speak with a member of our program staff if she would like to discuss clinical matters sooner than that.    No other changes to medication today.  Patient is making progress in her treatment goals and is still meeting with her individual therapist twice a week in addition to therapy.  In addition, proposed changes to medication, while appropriate, may lead to a near term exacerbation of symptoms per above.  Recommend she continue in this program for now.      Depression   o Overall improved   o Stable on current medications:  - Continue  escitalopram 20 mg by mouth daily  - Continue quetiapine 200 to 300 mg by mouth daily at bedtime as needed for sleep (see note above)  - Temazepam taper per above  o Continue adult day treatment      Generalized anxiety disorder  o Overall improved   o Treatment per above      Complex PTSD  o Overall stable   o Continue adult day treatment, individual therapy  o Medication changes per above    Continue all other medications as reviewed per electronic medical record today    Continue therapy as planned:    Enrolled in Adult Day Treatment     Continues to benefit from services    Continues to meet criteria for this level of care    Patient would be at reasonable risk of requiring a higher level of care in the absence of current services.    I feel this patient does not meet criteria for an involuntary hold and is appropriate for treatment at an outpatient level of care.    Continue with individual therapist as appropriate    Safety plan reviewed:    To the Emergency Department as needed or call after hours crisis line at [phone] or [phone]. [place] Crisis Text Line: Text [place] to 076966 or Suicide LifeLine Chat: suicidepreventionlifeline.org/chat    Follow-up:     schedule an appointment with me or another program provider in approximately 4 weeks or sooner if needed.  Call [facility] at [phone] to schedule.    Follow up with outpatient provider as planned or sooner if needed for acute medical concerns.    Questions or concerns:    Call main program line with questions or concerns [phone].    LetGivet may be used to communicate with your provider, but this is not intended to be used for emergencies.        Treatment Objective(s) Addressed in This Session:  The purpose of today's virtual visit is for this writer to provide oversight of patient's care while receiving program services. Specific treatment goals addressed included personal safety, symptoms stabilization and management,  wellness and mental health, and community resources/discharge planning.     This author or another program provider will follow up with the patient as noted above.     Patient agrees with the current plan of care.    Roberto Menjivar MD  1/28/22      Visit Details:  Type of service:  Video Visit    Start/End Time: see above    Originating Location (pt. Location): Home in [place]    Distant Location (provider location): Provider Remote Setting- Home Office    Platform used for Video Visit: [facility]    Physician has received verbal consent for a Video Visit from the patient? Yes    25 minutes spent on the date of the encounter doing chart review, patient visit and documentation     This document completed in part using Dragon Medical One dictation software.  Please excuse any inadvertent word or phrase substitutions.

## 2022-01-31 ENCOUNTER — VIRTUAL VISIT (OUTPATIENT)
Dept: FAMILY MEDICINE | Facility: CLINIC | Age: 52
End: 2022-01-31
Payer: MEDICARE

## 2022-01-31 DIAGNOSIS — H91.93 DECREASED HEARING OF BOTH EARS: Primary | ICD-10-CM

## 2022-01-31 DIAGNOSIS — U07.1 INFECTION DUE TO 2019 NOVEL CORONAVIRUS: ICD-10-CM

## 2022-01-31 PROCEDURE — 99204 OFFICE O/P NEW MOD 45 MIN: CPT | Mod: 95 | Performed by: FAMILY MEDICINE

## 2022-01-31 RX ORDER — BENZONATATE 100 MG/1
100 CAPSULE ORAL 3 TIMES DAILY PRN
Qty: 30 CAPSULE | Refills: 0 | Status: SHIPPED | OUTPATIENT
Start: 2022-01-31 | End: 2022-05-12

## 2022-01-31 NOTE — PROGRESS NOTES
Nora Irving is a 51 year old who is being evaluated via a billable video visit.      How would you like to obtain your AVS? MyChart  If the video visit is dropped, the invitation should be resent by: Send to e-mail at: [email or web address]  Will anyone else be joining your video visit? No      Video Start Time: 11:04AM     Assessment & Plan     Decreased hearing of both ears  Agree with audiology referral.   - Adult Audiology Referral    Infection due to 2019 novel coronavirus  Discussed symptomatic control - ok to trial tessalon perles although pt notes these have been unhelpful in the past.   Isolation has ended.   Ok to get COVID booster after isolation ended and symptoms improving.   Call if increasing symptoms including fever and/or shortness of breath.   - benzonatate (TESSALON) 100 MG capsule  Dispense: 30 capsule; Refill: 0      Return in about 3 months (around 4/30/2022) for physical exam.    Stella Glynn DO  [facility]    Subjective   Nora Irving is a 51 year old who presents for the following health issues     HPI   Patient presents with:  Establishing Care: Establishing Care today.     1) COVID:   - Tested positive on 1/18/22  - Exposed to spouse (who tested positive 1/17/22)  - Symptoms started 1/19/22  - Feeling okay now - notes persistent and frequent cough that results in some back pain. No fevers. Intermittent headache. No shortness of breath.   - Medications tried: cough drops, tea, robitussin, Delsym.   - Question: When should I get my COVID booster?     2) Mental Health:   - IOP 3 days a week, going well.   - Sees Dr. Menjivar through IOP; Primary psychiatrist is Dr. Contreras in [place]     3) Hearing loss: Notes gradual decline in hearing, worse since turning 51. Has to ask people to repeat themselves. Worse when there is background noise. Has Audiology appt scheduled but needs referral.       Review of Systems   Constitutional, HEENT, cardiovascular, pulmonary, gi and gu systems are  negative, except as otherwise noted.      Objective           Vitals:  No vitals were obtained today due to virtual visit.    Physical Exam   GENERAL: Healthy, alert and no distress  EYES: Eyes grossly normal to inspection.  No discharge or erythema, or obvious scleral/conjunctival abnormalities.  RESP: No audible wheeze,, or visible cyanosis.  No visible retractions or increased work of breathing.  Frequent coughing.   SKIN: Visible skin clear. No significant rash, abnormal pigmentation or lesions.  NEURO: Cranial nerves grossly intact.  Mentation and speech appropriate for age.  PSYCH: Mentation appears normal, affect normal/bright, judgement and insight intact, normal speech and appearance well-groomed.                Video-Visit Details    Type of service:  Video Visit    Video End Time:11:13 AM    Originating Location (pt. Location): Home    Distant Location (provider location):  [facility]     Platform used for Video Visit: Qoof

## 2022-02-02 ENCOUNTER — HOSPITAL ENCOUNTER (OUTPATIENT)
Dept: BEHAVIORAL HEALTH | Facility: CLINIC | Age: 52
End: 2022-02-02
Attending: PSYCHIATRY & NEUROLOGY
Payer: MEDICARE

## 2022-02-02 PROCEDURE — G0177 OPPS/PHP; TRAIN & EDUC SERV: HCPCS | Mod: GT

## 2022-02-02 PROCEDURE — 90853 GROUP PSYCHOTHERAPY: CPT | Mod: PO | Performed by: COUNSELOR

## 2022-02-02 ASSESSMENT — PATIENT HEALTH QUESTIONNAIRE - PHQ9
SUM OF ALL RESPONSES TO PHQ QUESTIONS 1-9: 8
SUM OF ALL RESPONSES TO PHQ QUESTIONS 1-9: 8
10. IF YOU CHECKED OFF ANY PROBLEMS, HOW DIFFICULT HAVE THESE PROBLEMS MADE IT FOR YOU TO DO YOUR WORK, TAKE CARE OF THINGS AT HOME, OR GET ALONG WITH OTHER PEOPLE: SOMEWHAT DIFFICULT

## 2022-02-02 NOTE — GROUP NOTE
Psychoeducation Group Note    PATIENT'S NAME: Nora Zamorano  MRN:   [id number]  :   1970  ACCT. NUMBER: [id number]  DATE OF SERVICE: 22  START TIME:  2:00 PM  END TIME:  2:50 PM  FACILITATOR: Taras Devi OTR/L  TOPIC: MH Life Skills Group: Communication and Social Skills Development                                    Service Modality:  Video Visit     Telemedicine Visit: The patient's condition can be safely assessed and treated via synchronous audio and visual telemedicine encounter.      Reason for Telemedicine Visit: Services only offered telehealth    Originating Site (Patient Location): Patient's home    Distant Site (Provider Location): Provider Remote Setting- Home Office    Consent:  The patient/guardian has verbally consented to: the potential risks and benefits of telemedicine (video visit) versus in person care; bill my insurance or make self-payment for services provided; and responsibility for payment of non-covered service.    Mode of Communication:  Video Conference via Medical Zoom    As the provider I attest to compliance with applicable laws and regulations related to telemedicine.       [facility] Adult Mental Health Day Treatment  TRACK: 1B    NUMBER OF PARTICIPANTS: 9 including a 2nd therapist Ana Suárez    Summary of Group / Topics Discussed:  Communication and Social Skills Development: Mental Health Social Social Support:  Patients were taught and gained awareness of characteristics of an effective support and how to develop this in their lives.  Patients identified both personal strengths and opportunities for growth in this areas to improve overall communication and connection with other people.    Patient Session Goals / Objectives:    Identified strengths and opportunities for growth in developing their social support system and how this impacts their ability to connect and communicate with other people       Improved awareness of important aspects of  social support systems and how this relates to mental health recovery        Established a plan for practice of these skills in their own environments    Practiced and reflected on how to generalize taught skills to their everyday life    Support groups are introduced during this session        Patient Participation / Response:  Fully participated with the group by sharing personal reflections / insights and openly received / provided feedback with other participants.    Demonstrated understanding of content through handouts, discussion ,aftercare planning and support group services , Verbalized understanding of content and Patient would benefit from additional opportunities to practice the content to be able to generalize it to their everyday life with increased intentionality, consistency, and efficacy in support of their psychiatric recovery    Treatment Plan:  Patient has a current master individualized treatment plan.  See Epic treatment plan for more information.    Traas Devi, OTR/L

## 2022-02-02 NOTE — GROUP NOTE
"Process Group Note    PATIENT'S NAME: Nora Zamorano  MRN:   [id number]  :   1970  ACCT. NUMBER: [id number]  DATE OF SERVICE: 22  START TIME:  1:00 PM  END TIME:  1:50 PM  FACILITATOR: Lindy Elizondo [facility]  TOPIC:  Process Group    Diagnoses:  309.81 (F43.10) Posttraumatic Stress Disorder    4. Other Diagnoses that is relevant to services:   296.32 (F33.1) Major Depressive Disorder, Recurrent Episode, Moderate; 300.02 (F41.1) Generalized Anxiety Disorder      [facility] Mental Health Day Treatment  TRACK: 1B    NUMBER OF PARTICIPANTS: 6                                      Service Modality:  Video Visit     Telemedicine Visit: The patient's condition can be safely assessed and treated via synchronous audio and visual telemedicine encounter.      Reason for Telemedicine Visit: Services only offered telehealth    Originating Site (Patient Location): Patient's home    Distant Site (Provider Location): Provider Remote Setting- Home Office    Consent:  The patient/guardian has verbally consented to: the potential risks and benefits of telemedicine (video visit) versus in person care; bill my insurance or make self-payment for services provided; and responsibility for payment of non-covered services.     Patient would like the video invitation sent by:  My Chart    Mode of Communication:  Video Conference via Medical Zoom    As the provider I attest to compliance with applicable laws and regulations related to telemedicine.                Data:    Session content: At the start of this group, patients were invited to check in by identifying themselves, describing their current emotional status, and identifying issues to address in this group.   Area(s) of treatment focus addressed in this session included Symptom Management and Personal Safety.    Nora Irving reported feeling \"bad\" (has had COVID-19 for 2 weeks now).  Her goal is to work on some paperwork for her [facility].  Patient declined " additional process time but contributed to group discussion and provided feedback and support to peers.      Therapeutic Interventions/Treatment Strategies:  Psychotherapist offered support, feedback and validation and reinforced use of skills.    Assessment:    Patient response:   Patient responded to session by accepting feedback, giving feedback and listening    Possible barriers to participation / learning include: and no barriers identified    Health Issues:   Yes: COVID-19       Substance Use Review:   Substance Use: No active concerns identified.    Mental Status/Behavioral Observations  Appearance:   Appropriate   Eye Contact:   Good   Psychomotor Behavior: Normal   Attitude:   Cooperative   Orientation:   All  Speech   Rate / Production: Normal    Volume:  Normal   Mood:    Normal  Affect:    Appropriate   Thought Content:   Clear  Thought Form:  Coherent  Logical     Insight:    Good     Plan:     Safety Plan: No current safety concerns identified.  Recommended that patient call 911 or go to the local ED should there be a change in any of these risk factors.     Barriers to treatment: None identified    Patient Contracts (see media tab):  None    Substance Use: Not addressed in session     Continue or Discharge: Patient will continue in Adult Day Treatment (ADT)  as planned. Patient is likely to benefit from learning and using skills as they work toward the goals identified in their treatment plan.      Lindy Elizondo, [facility]  February 2, 2022

## 2022-02-02 NOTE — GROUP NOTE
Psychoeducation Group Note    PATIENT'S NAME: Nora Zamorano  MRN:   [id number]  :   1970  ACCT. NUMBER: [id number]  DATE OF SERVICE: 22  START TIME:  3:00 PM  END TIME:  3:50 PM  FACILITATOR: Dary Rondon, ANDREA  TOPIC: MH RN Group: [facility]                                    Service Modality:  Video Visit     Telemedicine Visit: The patient's condition can be safely assessed and treated via synchronous audio and visual telemedicine encounter.      Reason for Telemedicine Visit:  covid19    Originating Site (Patient Location): Patient's home    Distant Site (Provider Location): Provider Remote Setting- Home Office    Consent:  The patient/guardian has verbally consented to: the potential risks and benefits of telemedicine (video visit) versus in person care; bill my insurance or make self-payment for services provided; and responsibility for payment of non-covered services.     Patient would like the video invitation sent by:  My Chart    Mode of Communication:  Video Conference via Medical Zoom    As the provider I attest to compliance with applicable laws and regulations related to telemedicine.          [facility] Adult Mental Health Day Treatment  TRACK: 1B    NUMBER OF PARTICIPANTS: 9    Summary of Group / Topics Discussed:  Foundations of Health: Sleep: Case study/sleep hygiene: Patients explored the connection between sleep and mental illness. Patients learned about how adequate sleep can improve health, productivity, wellness, quality of life, and safety.     Patient Session Goals / Objectives:  ? Demonstrated understanding of sleep hygiene practices and benefits of sleep  ? Identified sleep hygiene strategies to utilize     Described the connection between sleep disturbances and mental illness      Patient Participation / Response:  Moderately participated, sharing some personal reflections / insights and adequately adequately received / provided feedback with other  participants.    Verbalized understanding of foundations of health topic    Treatment Plan:  Patient has a current master individualized treatment plan.  See Epic treatment plan for more information.    Dary Rondon RN

## 2022-02-03 ASSESSMENT — PATIENT HEALTH QUESTIONNAIRE - PHQ9: SUM OF ALL RESPONSES TO PHQ QUESTIONS 1-9: 8

## 2022-02-07 ENCOUNTER — HOSPITAL ENCOUNTER (OUTPATIENT)
Dept: BEHAVIORAL HEALTH | Facility: CLINIC | Age: 52
End: 2022-02-07
Attending: PSYCHIATRY & NEUROLOGY
Payer: MEDICARE

## 2022-02-07 PROCEDURE — 90853 GROUP PSYCHOTHERAPY: CPT | Mod: PO | Performed by: COUNSELOR

## 2022-02-07 PROCEDURE — G0177 OPPS/PHP; TRAIN & EDUC SERV: HCPCS | Mod: PO

## 2022-02-07 NOTE — GROUP NOTE
"Process Group Note    PATIENT'S NAME: Nora Zamorano  MRN:   [id number]  :   1970  ACCT. NUMBER: [id number]  DATE OF SERVICE: 22  START TIME:  1:00 PM  END TIME:  1:50 PM  FACILITATOR: Lindy Elizondo [facility]; Dary Rondon RN  TOPIC:  Process Group    Diagnoses:  309.81 (F43.10) Posttraumatic Stress Disorder    4. Other Diagnoses that is relevant to services:   296.32 (F33.1) Major Depressive Disorder, Recurrent Episode, Moderate; 300.02 (F41.1) Generalized Anxiety Disorder      [facility] Day Treatment  TRACK: 1B    NUMBER OF PARTICIPANTS: 9                                      Service Modality:  Video Visit     Telemedicine Visit: The patient's condition can be safely assessed and treated via synchronous audio and visual telemedicine encounter.      Reason for Telemedicine Visit: Services only offered telehealth    Originating Site (Patient Location): Patient's home    Distant Site (Provider Location): Provider Remote Setting- Home Office    Consent:  The patient/guardian has verbally consented to: the potential risks and benefits of telemedicine (video visit) versus in person care; bill my insurance or make self-payment for services provided; and responsibility for payment of non-covered services.     Patient would like the video invitation sent by:  My Chart    Mode of Communication:  Video Conference via Medical Zoom    As the provider I attest to compliance with applicable laws and regulations related to telemedicine.                Data:    Session content: At the start of this group, patients were invited to check in by identifying themselves, describing their current emotional status, and identifying issues to address in this group.   Area(s) of treatment focus addressed in this session included Symptom Management and Personal Safety.    Nora Irving reported feeling \"overwhelmed\" and \"at my limit\" today.  Her goal is to compartmentalize some things and focus on what " she can control.  Patient declined additional process time but contributed to group discussion and provided feedback and support to peers.      Therapeutic Interventions/Treatment Strategies:  Psychotherapist offered support, feedback and validation and reinforced use of skills.    Assessment:    Patient response:   Patient responded to session by accepting feedback, giving feedback and listening    Possible barriers to participation / learning include: and no barriers identified    Health Issues:   None reported       Substance Use Review:   Substance Use: No active concerns identified.    Mental Status/Behavioral Observations  Appearance:   Appropriate   Eye Contact:   Good   Psychomotor Behavior: Normal   Attitude:   Cooperative   Orientation:   All  Speech   Rate / Production: Normal    Volume:  Normal   Mood:    Anxious  Depressed   Affect:    Appropriate   Thought Content:   Clear  Thought Form:  Coherent  Logical     Insight:    Good     Plan:     Safety Plan: No current safety concerns identified.  Recommended that patient call 911 or go to the local ED should there be a change in any of these risk factors.     Barriers to treatment: None identified    Patient Contracts (see media tab):  None    Substance Use: Not addressed in session     Continue or Discharge: Patient will continue in Adult Day Treatment (ADT)  as planned. Patient is likely to benefit from learning and using skills as they work toward the goals identified in their treatment plan.      Lindy Elizondo, [facility]  February 7, 2022

## 2022-02-07 NOTE — GROUP NOTE
Psychoeducation Group Note    PATIENT'S NAME: Nora Zamorano  MRN:   [id number]  :   1970  ACCT. NUMBER: [id number]  DATE OF SERVICE: 22  START TIME:  3:00 PM  END TIME:  3:50 PM  FACILITATOR: Dary Rondon, RN; Benjamin Ramirez RN  TOPIC: MH RN Group: Mental Health Maintenance                                    Service Modality:  Video Visit     Telemedicine Visit: The patient's condition can be safely assessed and treated via synchronous audio and visual telemedicine encounter.      Reason for Telemedicine Visit:  covid19    Originating Site (Patient Location): Patient's home    Distant Site (Provider Location): Provider Remote Setting- Home Office    Consent:  The patient/guardian has verbally consented to: the potential risks and benefits of telemedicine (video visit) versus in person care; bill my insurance or make self-payment for services provided; and responsibility for payment of non-covered services.     Patient would like the video invitation sent by:  My Chart    Mode of Communication:  Video Conference via Medical Zoom    As the provider I attest to compliance with applicable laws and regulations related to telemedicine.          [facility] Adult Mental Health Day Treatment  TRACK: 1B    NUMBER OF PARTICIPANTS: 9    Summary of Group / Topics Discussed:  Mental Health Maintenance:  Vulnerability: In this group, the concept of vulnerability was explored through the viewing, discussion, and self-reflection of the Chava Reyes Talk Titled,  The Power of Vulnerability.      Patient Session Goals / Objectives:  ? Defined and described definition of vulnerability   ? Identified 2 or more ways of practicing authenticity         Patient Participation / Response:  Fully participated with the group by sharing personal reflections / insights and openly received / provided feedback with other participants.    Identified / Expressed personal readiness to practice skills    Treatment  Plan:  Patient has a current master individualized treatment plan.  See Epic treatment plan for more information.    Benjamin Ramirez RN

## 2022-02-07 NOTE — GROUP NOTE
Psychoeducation Group Note    PATIENT'S NAME: Nora Zamorano  MRN:   [id number]  :   1970  ACCT. NUMBER: [id number]  DATE OF SERVICE: 22  START TIME:  2:00 PM  END TIME:  2:50 PM  FACILITATOR: Taras Devi OTR/L  TOPIC: MH Life Skills Group: Communication and Social Skills Development                                    Service Modality:  Video Visit     Telemedicine Visit: The patient's condition can be safely assessed and treated via synchronous audio and visual telemedicine encounter.      Reason for Telemedicine Visit: Services only offered telehealth    Originating Site (Patient Location): Patient's home    Distant Site (Provider Location): Provider Remote Setting- Home Office    Consent:  The patient/guardian has verbally consented to: the potential risks and benefits of telemedicine (video visit) versus in person care; bill my insurance or make self-payment for services provided; and responsibility for payment of non-covered services.     Mode of Communication:  Video Conference via Medical Zoom    As the provider I attest to compliance with applicable laws and regulations related to telemedicine.       [facility] Mental Health Day Treatment  TRACK: 1B    NUMBER OF PARTICIPANTS: 9    Summary of Group / Topics Discussed:  Communication and Social Skills Development: Social Supports: Social Risk Taking: Patients explored and evaluated how effective they are in different aspects of social risk taking.  Patients gained awareness of different areas in which they need/want to take risks, possible benefits of taking this risk, and action steps to take.  Patients identified both personal strengths and opportunities for growth in social risk taking to improve overall communication and connection with other people.      Patient Session Goals / Objectives:    Identified strengths and opportunities for growth in social risk taking skills and how these impact their ability to communicate  clearly with other people       Improved awareness of important aspects of social risk taking skills and how this relates to mental health recovery        Established a plan for practice of these skills in their own environments    Practiced and reflected on how to generalize taught skills to their everyday life      Patient Participation / Response:  Fully participated with the group by sharing personal reflections / insights and openly received / provided feedback with other participants.    Demonstrated understanding of content through handout/group discussion , Verbalized understanding of content and Patient would benefit from additional opportunities to practice the content to be able to generalize it to their everyday life with increased intentionality, consistency, and efficacy in support of their psychiatric recovery    Treatment Plan:  Patient has a current master individualized treatment plan.  See Epic treatment plan for more information.    Taras Devi, OTR/L

## 2022-02-09 ENCOUNTER — HOSPITAL ENCOUNTER (OUTPATIENT)
Dept: BEHAVIORAL HEALTH | Facility: CLINIC | Age: 52
End: 2022-02-09
Attending: PSYCHIATRY & NEUROLOGY
Payer: MEDICARE

## 2022-02-09 PROCEDURE — G0177 OPPS/PHP; TRAIN & EDUC SERV: HCPCS | Mod: GT

## 2022-02-09 PROCEDURE — 90853 GROUP PSYCHOTHERAPY: CPT | Mod: PO | Performed by: COUNSELOR

## 2022-02-09 NOTE — GROUP NOTE
Psychoeducation Group Note    PATIENT'S NAME: Nora Zamorano  MRN:   [id number]  :   1970  ACCT. NUMBER: [id number]  DATE OF SERVICE: 22  START TIME:  2:00 PM  END TIME:  2:50 PM  FACILITATOR: Taras Devi OTR/L  TOPIC: MH Life Skills Group: Resiliency Development                                    Service Modality:  Video Visit     Telemedicine Visit: The patient's condition can be safely assessed and treated via synchronous audio and visual telemedicine encounter.      Reason for Telemedicine Visit: Services only offered telehealth    Originating Site (Patient Location): Patient's home    Distant Site (Provider Location): Provider Remote Setting- Home Office    Consent:  The patient/guardian has verbally consented to: the potential risks and benefits of telemedicine (video visit) versus in person care; bill my insurance or make self-payment for services provided; and responsibility for payment of non-covered services.    Mode of Communication:  Video Conference via Medical Zoom    As the provider I attest to compliance with applicable laws and regulations related to telemedicine.       [facility] Adult Mental Health Day Treatment  TRACK: 1B    NUMBER OF PARTICIPANTS: 7    Summary of Group / Topics Discussed:  Resiliency Development:  Coping Skills(Vision Statement for Health and Wellness): Patients were taught how to identify stressors, signs of stress, coping skills, and prevention strategies for overall stress management.  Patients were given the opportunity to identify both ongoing and acute mental health symptoms and how to effectively manage these symptoms by developing an effective aftercare plan.  Patients increased awareness of community based resources.    Patient Session Goals / Objectives:    Identified how using coping skills can be used for symptom and stress management       Improved awareness of individualed symptoms and stressors and how to effectively cope      Established a relapse prevention plan to practice these skills in their own environments    Practiced and reflected on how to generalize taught skills to their everyday life        Patient Participation / Response:  Fully participated with the group by sharing personal reflections / insights and openly received / provided feedback with other participants.    Demonstrated understanding of content through video/handout/discussion , Verbalized understanding of content and Patient would benefit from additional opportunities to practice the content to be able to generalize it to their everyday life with increased intentionality, consistency, and efficacy in support of their psychiatric recovery    Treatment Plan:  Patient has a current master individualized treatment plan.  See Epic treatment plan for more information.    Taras Devi, OTR/L

## 2022-02-09 NOTE — GROUP NOTE
Process Group Note    PATIENT'S NAME: Nora Zamorano  MRN:   [id number]  :   1970  ACCT. NUMBER: [id number]  DATE OF SERVICE: 22  START TIME:  1:00 PM  END TIME:  1:50 PM  FACILITATOR: Lindy Elizondo [facility]  TOPIC:  Process Group    Diagnoses:  309.81 (F43.10) Posttraumatic Stress Disorder    4. Other Diagnoses that is relevant to services:   296.32 (F33.1) Major Depressive Disorder, Recurrent Episode, Moderate; 300.02 (F41.1) Generalized Anxiety Disorder      [facility] Mental Health Day Treatment  TRACK: 1B    NUMBER OF PARTICIPANTS: 7                                      Service Modality:  Video Visit     Telemedicine Visit: The patient's condition can be safely assessed and treated via synchronous audio and visual telemedicine encounter.      Reason for Telemedicine Visit: Services only offered telehealth    Originating Site (Patient Location): Patient's home    Distant Site (Provider Location): Provider Remote Setting- Home Office    Consent:  The patient/guardian has verbally consented to: the potential risks and benefits of telemedicine (video visit) versus in person care; bill my insurance or make self-payment for services provided; and responsibility for payment of non-covered services.     Patient would like the video invitation sent by:  My Chart    Mode of Communication:  Video Conference via Medical Zoom    As the provider I attest to compliance with applicable laws and regulations related to telemedicine.                Data:    Session content: At the start of this group, patients were invited to check in by identifying themselves, describing their current emotional status, and identifying issues to address in this group.   Area(s) of treatment focus addressed in this session included Symptom Management, Personal Safety and Abstinence/Relapse Prevention.    Nora Irving reported feeling good today.  Her goal is to work on getting a new dog.  Patient declined additional process  time but contributed to group discussion and provided feedback and support to peers.      Therapeutic Interventions/Treatment Strategies:  Psychotherapist reinforced use of skills.    Assessment:    Patient response:   Patient responded to session by accepting feedback, giving feedback and listening    Possible barriers to participation / learning include: and no barriers identified    Health Issues:   None reported       Substance Use Review:   Substance Use: Substance use has decreased    Mental Status/Behavioral Observations  Appearance:   Appropriate   Eye Contact:   Good   Psychomotor Behavior: Normal   Attitude:   Cooperative   Orientation:   All  Speech   Rate / Production: Normal    Volume:  Normal   Mood:    Normal  Affect:    Appropriate   Thought Content:   Clear  Thought Form:  Coherent  Logical     Insight:    Good     Plan:     Safety Plan: No current safety concerns identified.  Recommended that patient call 911 or go to the local ED should there be a change in any of these risk factors.     Barriers to treatment: None identified    Patient Contracts (see media tab):  None    Substance Use: Not addressed in session     Continue or Discharge: Patient will continue in Adult Day Treatment (ADT)  as planned. Patient is likely to benefit from learning and using skills as they work toward the goals identified in their treatment plan.      Lindy Elizondo, [facility]  February 9, 2022

## 2022-02-09 NOTE — GROUP NOTE
Psychoeducation Group Note    PATIENT'S NAME: Nora Zamorano  MRN:   [id number]  :   1970  ACCT. NUMBER: [id number]  DATE OF SERVICE: 22  START TIME:  3:00 PM  END TIME:  3:50 PM  FACILITATOR: Dary Rondon RN  TOPIC: MH RN Group: Mental Health Maintenance                                    Service Modality:  Video Visit     Telemedicine Visit: The patient's condition can be safely assessed and treated via synchronous audio and visual telemedicine encounter.      Reason for Telemedicine Visit:  covid19    Originating Site (Patient Location): Patient's home    Distant Site (Provider Location): Provider Remote Setting- Home Office    Consent:  The patient/guardian has verbally consented to: the potential risks and benefits of telemedicine (video visit) versus in person care; bill my insurance or make self-payment for services provided; and responsibility for payment of non-covered services.     Patient would like the video invitation sent by:  My Chart    Mode of Communication:  Video Conference via Medical Zoom    As the provider I attest to compliance with applicable laws and regulations related to telemedicine.          [facility] Adult Mental Health Day Treatment  TRACK: 1B    NUMBER OF PARTICIPANTS: 7    Summary of Group / Topics Discussed:  Mental Health Maintenance:  Vulnerability (continued discussion): In this group, the concept of vulnerability was explored through the viewing, discussion, and self-reflection of the Chava Reyes Talk Titled,  The Power of Vulnerability.      Patient Session Goals / Objectives:  ? Defined and described definition of vulnerability   ? Identified 2 or more ways of practicing authenticity         Patient Participation / Response:  Fully participated with the group by sharing personal reflections / insights and openly received / provided feedback with other participants.    Demonstrated understanding of topics discussed through group discussion and  participation and Identified / Expressed personal readiness to practice skills    Treatment Plan:  Patient has a current master individualized treatment plan.  See Epic treatment plan for more information.    Dary Rondon RN

## 2022-02-10 ENCOUNTER — HOSPITAL ENCOUNTER (OUTPATIENT)
Dept: BEHAVIORAL HEALTH | Facility: CLINIC | Age: 52
End: 2022-02-10
Attending: PSYCHIATRY & NEUROLOGY
Payer: MEDICARE

## 2022-02-10 PROCEDURE — 90853 GROUP PSYCHOTHERAPY: CPT | Mod: GT | Performed by: COUNSELOR

## 2022-02-10 PROCEDURE — G0177 OPPS/PHP; TRAIN & EDUC SERV: HCPCS | Mod: PO

## 2022-02-10 NOTE — GROUP NOTE
Psychoeducation Group Note    PATIENT'S NAME: Nora Zamorano  MRN:   [id number]  :   1970  ACCT. NUMBER: [id number]  DATE OF SERVICE: 2/10/22  START TIME:  2:00 PM  END TIME:  3:00 PM  FACILITATOR: Taras Devi OTR/L  TOPIC: MH Life Skills Group: Resiliency Development                                    Service Modality:  Video Visit     Telemedicine Visit: The patient's condition can be safely assessed and treated via synchronous audio and visual telemedicine encounter.      Reason for Telemedicine Visit: Services only offered telehealth    Originating Site (Patient Location): Patient's home    Distant Site (Provider Location): Provider Remote Setting- Home Office    Consent:  The patient/guardian has verbally consented to: the potential risks and benefits of telemedicine (video visit) versus in person care; bill my insurance or make self-payment for services provided; and responsibility for payment of non-covered services.     Mode of Communication:  Video Conference via Medical Zoom    As the provider I attest to compliance with applicable laws and regulations related to telemedicine.       [facility] Adult Mental Health Day Treatment  TRACK: 1B    NUMBER OF PARTICIPANTS: 6    Summary of Group / Topics Discussed:  Resiliency Development:  Coping Skills: Personal Recovery Outcome Measure: Patients were taught how to identify coping strategies and routines that they can adopt and use for management with focus on a balance between physical, emotional, social and spiritual strategies.    Patient Session Goals / Objectives:    Identified personal definitions of recovery for effectively managing both mental health and substance abuse/abuse symptoms     Improved awareness of the process of recovery and skills and strategies that support this       Established a plan for practice of these skills in their own environments    Practiced and reflected on how to generalize taught skills to their everyday  life      Patient Participation / Response:  Fully participated with the group by sharing personal reflections / insights and openly received / provided feedback with other participants.    Demonstrated understanding of content through handout/group discussion , Verbalized understanding of content and Patient would benefit from additional opportunities to practice the content to be able to generalize it to their everyday life with increased intentionality, consistency, and efficacy in support of their psychiatric recovery    Treatment Plan:  Patient has a current master individualized treatment plan.  See Epic treatment plan for more information.    Taras Devi, OTR/L

## 2022-02-10 NOTE — GROUP NOTE
"Process Group Note    PATIENT'S NAME: Nora Zamorano  MRN:   [id number]  :   1970  ACCT. NUMBER: [id number]  DATE OF SERVICE: 2/10/22  START TIME:  1:00 PM  END TIME:  1:50 PM  FACILITATOR: Lindy Elizondo [facility]  TOPIC:  Process Group    Diagnoses:  309.81 (F43.10) Posttraumatic Stress Disorder    4. Other Diagnoses that is relevant to services:   296.32 (F33.1) Major Depressive Disorder, Recurrent Episode, Moderate; 300.02 (F41.1) Generalized Anxiety Disorder      [facility] Mental Health Day Treatment  TRACK: 1B    NUMBER OF PARTICIPANTS: 6                                      Service Modality:  Video Visit     Telemedicine Visit: The patient's condition can be safely assessed and treated via synchronous audio and visual telemedicine encounter.      Reason for Telemedicine Visit: Services only offered telehealth    Originating Site (Patient Location): Patient's home    Distant Site (Provider Location): Provider Remote Setting- Home Office    Consent:  The patient/guardian has verbally consented to: the potential risks and benefits of telemedicine (video visit) versus in person care; bill my insurance or make self-payment for services provided; and responsibility for payment of non-covered services.     Patient would like the video invitation sent by:  My Chart    Mode of Communication:  Video Conference via Medical Zoom    As the provider I attest to compliance with applicable laws and regulations related to telemedicine.                Data:    Session content: At the start of this group, patients were invited to check in by identifying themselves, describing their current emotional status, and identifying issues to address in this group.   Area(s) of treatment focus addressed in this session included Symptom Management and Personal Safety.    Nora Irving reported feeling \"pretty good\" today.  Her goal is to go to [employer] to get some things for her cat. Patient declined additional process time " but contributed to group discussion and provided feedback and support to peers.      Therapeutic Interventions/Treatment Strategies:  Psychotherapist offered support, feedback and validation and reinforced use of skills.    Assessment:    Patient response:   Patient responded to session by accepting feedback, giving feedback and listening    Possible barriers to participation / learning include: and no barriers identified    Health Issues:   None reported       Substance Use Review:   Substance Use: cannabis .     Mental Status/Behavioral Observations  Appearance:   Appropriate   Eye Contact:   Good   Psychomotor Behavior: Normal   Attitude:   Cooperative   Orientation:   All  Speech   Rate / Production: Normal    Volume:  Normal   Mood:    Anxious  Depressed   Affect:    Appropriate   Thought Content:   Clear  Thought Form:  Coherent  Logical     Insight:    Good     Plan:     Safety Plan: No current safety concerns identified.  Recommended that patient call 911 or go to the local ED should there be a change in any of these risk factors.     Barriers to treatment: None identified    Patient Contracts (see media tab):  None    Substance Use: Not addressed in session     Continue or Discharge: Patient will continue in Adult Dual Disorder Program (DDP) as planned. Patient is likely to benefit from learning and using skills as they work toward the goals identified in their treatment plan.      Lindy Elizondo, [facility]  February 10, 2022

## 2022-02-10 NOTE — GROUP NOTE
Psychotherapy Group Note    PATIENT'S NAME: Nora Zamorano  MRN:   [id number]  :   1970  ACCT. NUMBER: [id number]  DATE OF SERVICE: 2/10/22  START TIME:  3:00 PM  END TIME:  3:50 PM  FACILITATOR: Lindy Elizondo LPCC  TOPIC: MH EBP Group: Relationship Skills  [facility] Adult Mental Health Day Treatment  TRACK: 1B    NUMBER OF PARTICIPANTS: 6                                      Service Modality:  Video Visit     Telemedicine Visit: The patient's condition can be safely assessed and treated via synchronous audio and visual telemedicine encounter.      Reason for Telemedicine Visit: Services only offered telehealth    Originating Site (Patient Location): Patient's home    Distant Site (Provider Location): Provider Remote Setting- Home Office    Consent:  The patient/guardian has verbally consented to: the potential risks and benefits of telemedicine (video visit) versus in person care; bill my insurance or make self-payment for services provided; and responsibility for payment of non-covered services.     Patient would like the video invitation sent by:  My Chart    Mode of Communication:  Video Conference via Medical Zoom    As the provider I attest to compliance with applicable laws and regulations related to telemedicine.          Summary of Group / Topics Discussed:  Relationship Skills: Boundaries: Patients were provided with a general overview of interpersonal boundaries and how lack of boundaries relates to symptoms and functioning. The purpose is to help patients identify boundary issues and gain awareness and skills to work towards healthier interpersonal boundaries. Current awareness of healthy boundary characteristics and barriers to establishing healthy boundaries were discussed.    Patient Session Goals / Objectives:    Familiarized patients with the concept of interpersonal boundaries and their characteristics    Discussed and practiced strategies to promote healthier interpersonal  boundaries    Identified boundary issues and identified plan to improve boundaries      Patient Participation / Response:  Fully participated with the group by sharing personal reflections / insights and openly received / provided feedback with other participants.    Demonstrated understanding of topics discussed through group discussion and participation, Demonstrated understanding of relationship skills and communication skills and Identified / Expressed personal readiness to incorporate effective communication skills    Treatment Plan:  Patient has a current master individualized treatment plan.  See Epic treatment plan for more information.    Lindy Elizondo, LPCC

## 2022-02-11 ENCOUNTER — TRANSFERRED RECORDS (OUTPATIENT)
Dept: HEALTH INFORMATION MANAGEMENT | Facility: CLINIC | Age: 52
End: 2022-02-11
Payer: MEDICARE

## 2022-02-12 ENCOUNTER — HEALTH MAINTENANCE LETTER (OUTPATIENT)
Age: 52
End: 2022-02-12

## 2022-02-14 NOTE — PROGRESS NOTES
AUDIOLOGY REPORT    SUBJECTIVE:  Nora Zamorano is a 51 year old female who was seen on 3/3/2022 in the Audiology Clinic at the [facility] for audiologic evaluation, referred by Stella Glynn DO.  The patient reports concerns about hearing in both ears.  She notes she is relying more on closed captions on TV.  Additionally, while working at the [employer], when there is music in the background and people are masked, it is difficult for her to understand patrons.  She feels she is asking for repetition from patrons more often than in the past.  The patient denies ear pain, drainage from the ears, tinnitus, history of ear surgery, or history of noise exposure.  She reports that she was treated for dizziness in the past with the Epley maneuver and dizziness resolved.  It has not returned.      OBJECTIVE:  Abuse Screening:  Do you feel unsafe at home or work/school? No  Do you feel threatened by someone? No  Does anyone try to keep you from having contact with others, or doing things outside of your home? No  Physical signs of abuse present? No     Fall Risk Screen:  1. Have you fallen two or more times in the past year? No  2. Have you fallen and had an injury in the past year? No    Timed Up and Go Score (in seconds): not tested  Is patient a fall risk? No  Referral initiated: No  Fall Risk Assessment Completed by Audiology    Otoscopic exam indicates a very small amount of non-occluding cerumen bilaterally.     Pure Tone Thresholds assessed using conventional audiometry with good reliability from 250-8000 Hz bilaterally using insert earphones and circumaural headphones.      RIGHT: Normal hearing at all frequencies except 1000 Hz, where there is a mild sensorineural hearing loss.     LEFT: Normal/borderline normal hearing.      Tympanogram:    RIGHT: normal eardrum mobility    LEFT:   normal eardrum mobility    Reflexes (reported by stimulus ear):  RIGHT:  Ipsilateral is present at normal levels  RIGHT: Contralateral is present at normal levels  LEFT:   Ipsilateral is present at normal levels  LEFT:   Contralateral is present at normal levels      Speech Reception Threshold:    RIGHT: 20 dB HL    LEFT:   15 dB HL  Word Recognition Score:     RIGHT: 96% at 60 dB HL using NU-6 recorded word list.    LEFT:   100% at 55 dB HL using NU-6 recorded word list.      ASSESSMENT:   Right - mild sensorineural hearing loss at 1000 Hz  Left - normal/borderline normal hearing   Normal middle ear function bilaterally    Today s results were discussed with the patient in detail. Since the hearing loss is only at one frequency and in one ear, a hearing aid is not recommended.     PLAN:    1. Retest hearing in 1-2 years, or sooner if changes are noted.  2. Follow up with Dr. Glynn.    The patient expressed understanding and agreement with this plan.    Lawrence Denson, CCC-A, [facility]  Licensed Audiologist  [place] #[id number]    Enclosure: audiogram    Cc Stella Glynn DO

## 2022-02-15 ENCOUNTER — TRANSCRIBE ORDERS (OUTPATIENT)
Dept: OTHER | Age: 52
End: 2022-02-15

## 2022-02-15 DIAGNOSIS — H54.7 DECREASED VISION: Primary | ICD-10-CM

## 2022-02-16 ENCOUNTER — TELEPHONE (OUTPATIENT)
Dept: BEHAVIORAL HEALTH | Facility: CLINIC | Age: 52
End: 2022-02-16
Payer: MEDICARE

## 2022-02-16 NOTE — TELEPHONE ENCOUNTER
Writer called client to explain their absence from group today.  Writer left them a voicemail asking that they return writer's call.    Lindy Elizondo, [facility] on 2/16/2022 at 2:55 PM

## 2022-03-03 ENCOUNTER — OFFICE VISIT (OUTPATIENT)
Dept: AUDIOLOGY | Facility: CLINIC | Age: 52
End: 2022-03-03
Payer: MEDICARE

## 2022-03-03 DIAGNOSIS — H91.93 DECREASED HEARING OF BOTH EARS: ICD-10-CM

## 2022-03-03 DIAGNOSIS — H90.41 SENSORINEURAL HEARING LOSS (SNHL) OF RIGHT EAR WITH UNRESTRICTED HEARING OF LEFT EAR: Primary | ICD-10-CM

## 2022-03-03 PROCEDURE — 92557 COMPREHENSIVE HEARING TEST: CPT | Performed by: AUDIOLOGIST-HEARING AID FITTER

## 2022-03-03 PROCEDURE — 92550 TYMPANOMETRY & REFLEX THRESH: CPT | Performed by: AUDIOLOGIST-HEARING AID FITTER

## 2022-03-07 ENCOUNTER — TELEPHONE (OUTPATIENT)
Dept: BEHAVIORAL HEALTH | Facility: CLINIC | Age: 52
End: 2022-03-07
Payer: MEDICARE

## 2022-03-07 DIAGNOSIS — F33.1 MODERATE EPISODE OF RECURRENT MAJOR DEPRESSIVE DISORDER (H): ICD-10-CM

## 2022-03-07 DIAGNOSIS — F41.1 GENERALIZED ANXIETY DISORDER: ICD-10-CM

## 2022-03-07 NOTE — TELEPHONE ENCOUNTER
Writer called Pt today and left a message requesting a return phone call to discuss the once per week clinic track that she has been on a waitlist for for a few weeks and to coordinate a start date. (offering 3/16)

## 2022-03-22 ENCOUNTER — OFFICE VISIT (OUTPATIENT)
Dept: OPHTHALMOLOGY | Facility: CLINIC | Age: 52
End: 2022-03-22
Attending: OPHTHALMOLOGY
Payer: MEDICARE

## 2022-03-22 DIAGNOSIS — H53.40 VISUAL FIELD DEFECT: Primary | ICD-10-CM

## 2022-03-22 DIAGNOSIS — H53.40 VISUAL FIELD DEFECT: ICD-10-CM

## 2022-03-22 DIAGNOSIS — H54.7 DECREASED VISION: ICD-10-CM

## 2022-03-22 PROCEDURE — 92083 EXTENDED VISUAL FIELD XM: CPT | Performed by: OPHTHALMOLOGY

## 2022-03-22 PROCEDURE — G0463 HOSPITAL OUTPT CLINIC VISIT: HCPCS | Performed by: TECHNICIAN/TECHNOLOGIST

## 2022-03-22 PROCEDURE — 92133 CPTRZD OPH DX IMG PST SGM ON: CPT | Performed by: OPHTHALMOLOGY

## 2022-03-22 PROCEDURE — 99204 OFFICE O/P NEW MOD 45 MIN: CPT | Performed by: OPHTHALMOLOGY

## 2022-03-22 ASSESSMENT — CONF VISUAL FIELD
OS_NORMAL: 1
METHOD: COUNTING FINGERS
OD_NORMAL: 1

## 2022-03-22 ASSESSMENT — VISUAL ACUITY
OD_CC+: +2
OS_CC: 20/20
OD_CC: 20/30
METHOD: SNELLEN - LINEAR
OS_CC+: -1
CORRECTION_TYPE: GLASSES

## 2022-03-22 ASSESSMENT — CUP TO DISC RATIO
OS_RATIO: 0.3
OD_RATIO: 0.3

## 2022-03-22 ASSESSMENT — TONOMETRY
OS_IOP_MMHG: 16
OD_IOP_MMHG: 14
IOP_METHOD: ICARE

## 2022-03-22 ASSESSMENT — REFRACTION_WEARINGRX
OD_CYLINDER: +0.75
OS_SPHERE: -7.25
OS_AXIS: 012
OD_AXIS: 153
OD_SPHERE: -7.00
OS_CYLINDER: +0.25

## 2022-03-22 ASSESSMENT — SLIT LAMP EXAM - LIDS
COMMENTS: NORMAL
COMMENTS: NORMAL

## 2022-03-22 ASSESSMENT — EXTERNAL EXAM - LEFT EYE: OS_EXAM: NORMAL

## 2022-03-22 ASSESSMENT — EXTERNAL EXAM - RIGHT EYE: OD_EXAM: NORMAL

## 2022-03-22 NOTE — Clinical Note
3/22/2022       RE: Nora Zamorano  [address]  [facility] [address]     Dear Colleague,    Thank you for referring your patient, Nora Zamorano, to the [facility] [place] at [facility]. Please see a copy of my visit note below.         Assessment & Plan     Nora Zamorano is a 51 year old female with the following diagnoses:   1. Decreased vision    2. Visual field defect         Patient was sent for consultation by Dr. Franco Mohan for decreased vision.     HPI:    Saw Dr. Franco Mohan on 2/11/22 for routine eye exam. Left eye has always been a little worse but more recently right eye becoming more blurry. Right eye refracted to 20/40      First noticed right eye more blurry at beginning of the year. Notices more so up close. Things not as clear. Thinks it may have gotten worse over the course of a month and then stabilized. No double vision. Some issues with depth perception at a distance. No glare. Does not feel visual field affected. No pain with eye movements. Has had headaches more frequently in last 3 or 4 months. Has 3 or 4 episodes lasting less than two hours. Describes it as throbbing pain behind right eye. Pain improves with tylenol and laying down. History of dry eye but no worsened symptoms. Does not use artifical tears.    No history of diabetes (was prediabetic in past). A1c in 2015 (5.5). No history of hypertension. No ocular surgery.    Independent historians:  Patient    Review of outside testing:  None    My interpretation performed today of outside testing:  None    Review of outside clinical notes:  Dr. Franco Mohan clinic notes 2/11/22:      Past medical history:  History of MRSA infection   Obesity   Abnormal uterine bleeding   Migraines  Posttraumatic stress disorder   Moderate episode of recurrent major depressive disorder (H)   Generalized anxiety disorder   Thyroid papillary carcinoma in 2015 (s/p  thyroid removal)    Medications:   benzonatate  cloNIDine  escitalopram  levothyroxine  potassium chloride  QUEtiapine  temazepam  triamcinolone    Family history / social history:  Father with wet macular degeneration (dx at 70 y.o.)  Father glaucoma (dx in 50)  Mother open angle glaucoma (dx in late 60s)    No tobacco use  No alcohol use  Approved for medical cannabis (not currently using)  No other illicit substances    Exam:  Visual acuity 20/30 +2 right eye with no improvement with ph correction and 20/20 -1 left eye.  Color vision 11/11 right eye and 11/11 left eye. PERRL without RAPD. Intraocular pressure 14 right eye and 16 left eye.  Anterior segment exam with 1+ nuclear sclerosis bilaterally. Fundus exam with large floater . Strabismus exam normal.  Amsler showed no distortion but does describe the lines as being more faint.  Prism testing revealed patient can see at least 20/25 in both eyes.  Patient can move her eyes to look around the floater and states it can clear up her vision.      Tests ordered and interpreted today:  Visual field central and central scotoma in the right eye and normal in the left.    OCT shows RNFL within normal limits on the right 86 and RNFL of borderline thickness on the left 78. Rim width analysis of the right eye was within normal limits and the left eye showed temporal depression.    Topography within normal limits    Discussion of management / interpretation with another provider:   None    Assessment/Plan:   It is my impression that patient has decreased vision RIGHT eye.  She has a large floater overlying the fovea.  It is so dense that I cannot properly see the macula unless I have her move her eye so that I can see.  This is most likely the cause of her decreased vision as she is able to quickly look in the periphery and then back centrally and able to note normalization of her vision.   Unfortunately, she was dilated prior to my evaluation of the pupils and unable to  rule out an optic neuropathy as cause of her vision changes.  I will have her return to check her pupils.  If pupils normal without afferent pupillary defect likely due to floater and will have her see retina specialist for consideration of possible vitrectomy versus vitreolysis (patient leaning vitrectomy)  If pupils abnormal will obtain MRI for a subtle optic neuropathy not causing retinal nerve fiber layer loss or ganglion cell layer  Loss.              Attending Physician Attestation:  Complete documentation of historical and exam elements from today's encounter can be found in the full encounter summary report (not reduplicated in this progress note).  I personally obtained the chief complaint(s) and history of present illness.  I confirmed and edited as necessary the review of systems, past medical/surgical history, family history, social history, and examination findings as documented by others; and I examined the patient myself.  I personally reviewed the relevant tests, images, and reports as documented above.  I formulated and edited as necessary the assessment and plan and discussed the findings and management plan with the patient and family. I was present with the medical student who participated in the service and in the documentation of this note. - MD Toan Martínez MS             Again, thank you for allowing me to participate in the care of your patient.      Sincerely,    Roberto Dhillon MD

## 2022-03-22 NOTE — PROGRESS NOTES
Assessment & Plan     Nora Zamorano is a 51 year old female with the following diagnoses:   1. Decreased vision    2. Visual field defect         Patient was sent for consultation by Dr. Franco Mohan for decreased vision.     HPI:    Saw Dr. Franco Mohan on 2/11/22 for routine eye exam. Left eye has always been a little worse but more recently right eye becoming more blurry. Right eye refracted to 20/40      First noticed right eye more blurry at beginning of the year. Notices more so up close. Things not as clear. Thinks it may have gotten worse over the course of a month and then stabilized. No double vision. Some issues with depth perception at a distance. No glare. Does not feel visual field affected. No pain with eye movements. Has had headaches more frequently in last 3 or 4 months. Has 3 or 4 episodes lasting less than two hours. Describes it as throbbing pain behind right eye. Pain improves with tylenol and laying down. History of dry eye but no worsened symptoms. Does not use artifical tears.    No history of diabetes (was prediabetic in past). A1c in 2015 (5.5). No history of hypertension. No ocular surgery.    Independent historians:  Patient    Review of outside testing:  None    My interpretation performed today of outside testing:  None    Review of outside clinical notes:  Dr. Franco Mohan clinic notes 2/11/22:      Past medical history:  History of MRSA infection   Obesity   Abnormal uterine bleeding   Migraines  Posttraumatic stress disorder   Moderate episode of recurrent major depressive disorder (H)   Generalized anxiety disorder   Thyroid papillary carcinoma in 2015 (s/p thyroid removal)    Medications:   benzonatate  cloNIDine  escitalopram  levothyroxine  potassium chloride  QUEtiapine  temazepam  triamcinolone    Family history / social history:  Father with wet macular degeneration (dx at 70 y.o.)  Father glaucoma (dx in 50)  Mother open angle glaucoma (dx in late 60s)    No  tobacco use  No alcohol use  Approved for medical cannabis (not currently using)  No other illicit substances    Exam:  Visual acuity 20/30 +2 right eye with no improvement with ph correction and 20/20 -1 left eye.  Color vision 11/11 right eye and 11/11 left eye. PERRL without RAPD. Intraocular pressure 14 right eye and 16 left eye.  Anterior segment exam with 1+ nuclear sclerosis bilaterally. Fundus exam with large floater . Strabismus exam normal.  Amsler showed no distortion but does describe the lines as being more faint.  Prism testing revealed patient can see at least 20/25 in both eyes.  Patient can move her eyes to look around the floater and states it can clear up her vision.      Tests ordered and interpreted today:  Visual field central and central scotoma in the right eye and normal in the left.    OCT shows RNFL within normal limits on the right 86 and RNFL of borderline thickness on the left 78. Rim width analysis of the right eye was within normal limits and the left eye showed temporal depression.    Topography within normal limits    Discussion of management / interpretation with another provider:   None    Assessment/Plan:   It is my impression that patient has decreased vision RIGHT eye.  She has a large floater overlying the fovea.  It is so dense that I cannot properly see the macula unless I have her move her eye so that I can see.  This is most likely the cause of her decreased vision as she is able to quickly look in the periphery and then back centrally and able to note normalization of her vision.   Unfortunately, she was dilated prior to my evaluation of the pupils and unable to rule out an optic neuropathy as cause of her vision changes.  I will have her return to check her pupils.  If pupils normal without afferent pupillary defect likely due to floater and will have her see retina specialist for consideration of possible vitrectomy versus vitreolysis (patient leaning vitrectomy)  If  pupils abnormal will obtain MRI for a subtle optic neuropathy not causing retinal nerve fiber layer loss or ganglion cell layer  Loss.              Attending Physician Attestation:  Complete documentation of historical and exam elements from today's encounter can be found in the full encounter summary report (not reduplicated in this progress note).  I personally obtained the chief complaint(s) and history of present illness.  I confirmed and edited as necessary the review of systems, past medical/surgical history, family history, social history, and examination findings as documented by others; and I examined the patient myself.  I personally reviewed the relevant tests, images, and reports as documented above.  I formulated and edited as necessary the assessment and plan and discussed the findings and management plan with the patient and family. I was present with the medical student who participated in the service and in the documentation of this note. - MD Toan Martínez, MS

## 2022-03-22 NOTE — LETTER
3/22/2022         RE:  :  MRN: Nora Zamorano  1970  [id number]     Dear Dr. Mohan:     Thank you for asking me to see your very pleasant patient, Nora Zamorano, in neuro-ophthalmic consultation.  I would like to thank you for sending your records and I have summarized them in the history of present illness. My assessment and plan are below.  For further details, please see my attached clinic note.      Assessment & Plan     Nora Zamorano is a 51 year old female with the following diagnoses:   1. Decreased vision    2. Visual field defect         Patient was sent for consultation by Dr. Franco Mohan for decreased vision.     HPI:    Saw Dr. Franco Moahn on 22 for routine eye exam. Left eye has always been a little worse but more recently right eye becoming more blurry. Right eye refracted to 20/40      First noticed right eye more blurry at beginning of the year. Notices more so up close. Things not as clear. Thinks it may have gotten worse over the course of a month and then stabilized. No double vision. Some issues with depth perception at a distance. No glare. Does not feel visual field affected. No pain with eye movements. Has had headaches more frequently in last 3 or 4 months. Has 3 or 4 episodes lasting less than two hours. Describes it as throbbing pain behind right eye. Pain improves with tylenol and laying down. History of dry eye but no worsened symptoms. Does not use artifical tears.    No history of diabetes (was prediabetic in past). A1c in 2015 (5.5). No history of hypertension. No ocular surgery.    Independent historians:  Patient    Review of outside testing:  None    My interpretation performed today of outside testing:  None    Review of outside clinical notes:  Dr. Franco Mohan clinic notes 22:      Past medical history:  History of MRSA infection   Obesity   Abnormal uterine bleeding   Migraines  Posttraumatic stress disorder   Moderate episode of recurrent major  depressive disorder (H)   Generalized anxiety disorder   Thyroid papillary carcinoma in 2015 (s/p thyroid removal)    Medications:   benzonatate  cloNIDine  escitalopram  levothyroxine  potassium chloride  QUEtiapine  temazepam  triamcinolone    Family history / social history:  Father with wet macular degeneration (dx at 70 y.o.)  Father glaucoma (dx in 50)  Mother open angle glaucoma (dx in late 60s)    No tobacco use  No alcohol use  Approved for medical cannabis (not currently using)  No other illicit substances    Exam:  Visual acuity 20/30 +2 right eye with no improvement with ph correction and 20/20 -1 left eye.  Color vision 11/11 right eye and 11/11 left eye. PERRL without RAPD. Intraocular pressure 14 right eye and 16 left eye.  Anterior segment exam with 1+ nuclear sclerosis bilaterally. Fundus exam with large floater . Strabismus exam normal.  Amsler showed no distortion but does describe the lines as being more faint.  Prism testing revealed patient can see at least 20/25 in both eyes.  Patient can move her eyes to look around the floater and states it can clear up her vision.      Tests ordered and interpreted today:  Visual field central and central scotoma in the right eye and normal in the left.    OCT shows RNFL within normal limits on the right 86 and RNFL of borderline thickness on the left 78. Rim width analysis of the right eye was within normal limits and the left eye showed temporal depression.    Topography within normal limits    Discussion of management / interpretation with another provider:   None    Assessment/Plan:   It is my impression that patient has decreased vision RIGHT eye.  She has a large floater overlying the fovea.  It is so dense that I cannot properly see the macula unless I have her move her eye so that I can see.  This is most likely the cause of her decreased vision as she is able to quickly look in the periphery and then back centrally and able to note normalization of  her vision.   Unfortunately, she was dilated prior to my evaluation of the pupils and unable to rule out an optic neuropathy as cause of her vision changes.  I will have her return to check her pupils.  If pupils normal without afferent pupillary defect likely due to floater and will have her see retina specialist for consideration of possible vitrectomy versus vitreolysis (patient leaning vitrectomy)  If pupils abnormal will obtain MRI for a subtle optic neuropathy not causing retinal nerve fiber layer loss or ganglion cell layer  Loss.             Again, thank you for allowing me to participate in the care of your patient.      Sincerely,    Roberto Dhillon MD  Professor  Ophthalmology Residency   Director of Neuro-Ophthalmology  [place] [facility] [employer] Endowed Chair  Departments of Ophthalmology, Neurology, and Neurosurgery  [facility] [address] [place]  [address]  T - [phone]  F - [phone]  ANTONINO [email or web address]      CC: Stella Glynn DO  [facility]  [address]  [facility] [address]  Via In Basket    DO [facility]   [address]  [place] [address]  VIA Fax: [phone]    DX = vitreous floater causing visual field defect

## 2022-03-23 ENCOUNTER — OFFICE VISIT (OUTPATIENT)
Dept: OPHTHALMOLOGY | Facility: CLINIC | Age: 52
End: 2022-03-23
Payer: MEDICARE

## 2022-03-23 DIAGNOSIS — H43.811 POSTERIOR VITREOUS DETACHMENT OF RIGHT EYE: Primary | ICD-10-CM

## 2022-03-23 DIAGNOSIS — H43.811 POSTERIOR VITREOUS DETACHMENT OF RIGHT EYE: ICD-10-CM

## 2022-03-23 DIAGNOSIS — H53.10 SUBJECTIVE VISION DISTURBANCE: Primary | ICD-10-CM

## 2022-03-23 PROCEDURE — 99212 OFFICE O/P EST SF 10 MIN: CPT | Performed by: OPHTHALMOLOGY

## 2022-03-23 NOTE — PROGRESS NOTES
Assessment & Plan     Nora Zamorano is a 51 year old female with the following diagnoses:   1. Subjective vision disturbance    2. Posterior vitreous detachment of right eye         Patient was last seen yesterday for decreased vision RIGHT eye.  I felt her vision was most likely down due to large floater overlying fovea, but optic neuropathy could not be ruled out as patient was dilated prior to me checking pupils. I asked her to return today for pupil check.  Since yesterday she went home to see if she can see past the floater with quick gaze changes and she agrees it does clear things up     No afferent pupillary defect.  Recommend return for evaluation with retina specialist for consideration of Pars plana vitrectomy (PPV).     10 minutes spent on the date of encounter doing chart review, history and exam, documentation, and further activities as noted above.            Attending Physician Attestation:  Complete documentation of historical and exam elements from today's encounter can be found in the full encounter summary report (not reduplicated in this progress note).  I personally obtained the chief complaint(s) and history of present illness.  I confirmed and edited as necessary the review of systems, past medical/surgical history, family history, social history, and examination findings as documented by others; and I examined the patient myself.  I personally reviewed the relevant tests, images, and reports as documented above.  I formulated and edited as necessary the assessment and plan and discussed the findings and management plan with the patient and family. - Roberto Dhillon MD

## 2022-03-30 ENCOUNTER — OFFICE VISIT (OUTPATIENT)
Dept: OPHTHALMOLOGY | Facility: CLINIC | Age: 52
End: 2022-03-30
Attending: OPHTHALMOLOGY
Payer: MEDICARE

## 2022-03-30 DIAGNOSIS — H33.321 ROUND HOLE OF RIGHT RETINA WITHOUT DETACHMENT: ICD-10-CM

## 2022-03-30 DIAGNOSIS — H43.391 VITREOUS SYNERESIS OF RIGHT EYE: Primary | ICD-10-CM

## 2022-03-30 DIAGNOSIS — Z85.850 HISTORY OF THYROID CANCER: ICD-10-CM

## 2022-03-30 DIAGNOSIS — H35.61 RETINAL HEMORRHAGE OF RIGHT EYE: ICD-10-CM

## 2022-03-30 PROCEDURE — 92250 FUNDUS PHOTOGRAPHY W/I&R: CPT | Performed by: OPHTHALMOLOGY

## 2022-03-30 PROCEDURE — 92134 CPTRZ OPH DX IMG PST SGM RTA: CPT | Performed by: OPHTHALMOLOGY

## 2022-03-30 PROCEDURE — G0463 HOSPITAL OUTPT CLINIC VISIT: HCPCS | Mod: 25

## 2022-03-30 PROCEDURE — 92014 COMPRE OPH EXAM EST PT 1/>: CPT | Performed by: OPHTHALMOLOGY

## 2022-03-30 ASSESSMENT — VISUAL ACUITY
OD_CC+: -1
OS_CC: 20/20
CORRECTION_TYPE: GLASSES
METHOD: SNELLEN - LINEAR
OD_CC: 20/25

## 2022-03-30 ASSESSMENT — SLIT LAMP EXAM - LIDS
COMMENTS: NORMAL
COMMENTS: NORMAL

## 2022-03-30 ASSESSMENT — REFRACTION_WEARINGRX
OS_SPHERE: -7.25
OD_CYLINDER: +0.75
OS_AXIS: 012
OD_AXIS: 153
OD_SPHERE: -7.00
OS_CYLINDER: +0.25

## 2022-03-30 ASSESSMENT — CONF VISUAL FIELD
OD_NORMAL: 1
OS_NORMAL: 1

## 2022-03-30 ASSESSMENT — CUP TO DISC RATIO
OD_RATIO: 0.3
OS_RATIO: 0.3

## 2022-03-30 ASSESSMENT — TONOMETRY
OD_IOP_MMHG: 19
OS_IOP_MMHG: 19
IOP_METHOD: TONOPEN

## 2022-03-30 ASSESSMENT — EXTERNAL EXAM - RIGHT EYE: OD_EXAM: NORMAL

## 2022-03-30 ASSESSMENT — EXTERNAL EXAM - LEFT EYE: OS_EXAM: NORMAL

## 2022-03-30 NOTE — PROGRESS NOTES
CC -   Referral from Dr. Dhillon for floaters right eye     INTERVAL HISTORY - Initial visit with me    PM -   PattiMaria Fernanda Zamorano is a  52 year old year-old patient with history of high myopia and thyroid cancer (thyroid papillary carcinoma) on levothyroxine (diagnosed in 2011; had thyroidectomy; had recurrence in right neck lymph nodes; received radioactive Iodine and has been tumor free since 2017)    Presents here for blurry vision in the right eye.    At the end of last year, started to notice increasing blurry vision in her right eye, unable to refract to 20/20 by her regular doctor and was referred.     PAST OCULAR SURGERY  No history of eyes surgery  No laser     RETINAL IMAGING:  OCT MAC 3/30/2022  OD - slightly hazy view, normal contour, PHF attached   OS - normal contour, PHF attached     Optos: 3/30/2022  RE - slight blur overlying inferior macula and nerve head, no holes or tears  LE - temporal hyperpigmented spot, otherwise nl fundus    ASSESSMENT & PLAN    1. Vitreous syneresis and opacities right eye   - visually significant   - large fluffy and deep   - she doesn't have PVD so vitreous opacities are not what we normally see in patients with PVD; other differentials include primary intraocular lymphoma (rare cells in ant vit) vs amyloidosis infiltration vs vitreous base avulsion   - discussed observation vs laser floaterectomy vs  PPV with the patient; discussed the possibility of primary intraocular lymphoma (very few cells seen in anterior vitreous; with intraocular lymphoma I expect to see a lot more cells); will re examine in 1-2 months to discuss about surgery and vitreous biopsy    2. Atrophic holes right eye    - Retina detachment precautions were discussed with the patient (presence or increased in flashes, floaters or a curtain in the visual field) and was asked to return if any of the those occur    3. Retinal hemorrhage right eye    - single microaneurysm noted in superior periphery; no likely a  radiation retinopathy or DR   - observe    4. Hx of thyroid cancer   - with metastasis to neck lymph nodes; did not receive external radiotherapy; treated in 2017 and tumor free since       return to clinic: 1-2 months for DFE and OCT and optos ou and VF TOP each eye      Complete documentation of historical and exam elements from today's encounter can be found in the full encounter summary report (not reduplicated in this progress note). I personally obtained the chief complaint(s) and history of present illness.  I confirmed and edited as necessary the review of systems, past medical/surgical history, family history, social history, and examination findings as documented by others; and I examined the patient myself. I personally reviewed the relevant tests, images, and reports as documented above. I formulated and edited as necessary the assessment and plan and discussed the findings and management plan with the patient and family.    Clay Moore MD, PhD

## 2022-03-30 NOTE — LETTER
3/30/2022       RE: Nora Zamorano  [address]  [facility] [address]     Dear Colleague,    Thank you for referring your patient, Nora Zamorano, to the [facility] [place] at [facility]. Please see a copy of my visit note below.    CC -   Referral from Dr. Dhillon for floaters right eye     INTERVAL HISTORY - Initial visit with me    PM -   Nora Zamorano is a  52 year old year-old patient with history of high myopia and thyroid cancer (thyroid papillary carcinoma) on levothyroxine (diagnosed in 2011; had thyroidectomy; had recurrence in right neck lymph nodes; received radioactive Iodine and has been tumor free since 2017)    Presents here for blurry vision in the right eye.    At the end of last year, started to notice increasing blurry vision in her right eye, unable to refract to 20/20 by her regular doctor and was referred.     PAST OCULAR SURGERY  No history of eyes surgery  No laser     RETINAL IMAGING:  OCT MAC 3/30/2022  OD - slightly hazy view, normal contour, PHF attached   OS - normal contour, PHF attached     Optos: 3/30/2022  RE - slight blur overlying inferior macula and nerve head, no holes or tears  LE - temporal hyperpigmented spot, otherwise nl fundus    ASSESSMENT & PLAN    1. Vitreous syneresis and opacities right eye   - visually significant   - large fluffy and deep   - she doesn't have PVD so vitreous opacities are not what we normally see in patients with PVD; other differentials include primary intraocular lymphoma (rare cells in ant vit) vs amyloidosis infiltration vs vitreous base avulsion   - discussed observation vs laser floaterectomy vs  PPV with the patient; discussed the possibility of primary intraocular lymphoma (very few cells seen in anterior vitreous; with intraocular lymphoma I expect to see a lot more cells); will re examine in 1-2 months to discuss about surgery and vitreous biopsy    2. Atrophic  holes right eye    - Retina detachment precautions were discussed with the patient (presence or increased in flashes, floaters or a curtain in the visual field) and was asked to return if any of the those occur    3. Retinal hemorrhage right eye    - single microaneurysm noted in superior periphery; no likely a radiation retinopathy or DR   - observe    4. Hx of thyroid cancer   - with metastasis to neck lymph nodes; did not receive external radiotherapy; treated in 2017 and tumor free since       return to clinic: 1-2 months for DFE and OCT and optos ou and VF TOP each eye      Complete documentation of historical and exam elements from today's encounter can be found in the full encounter summary report (not reduplicated in this progress note). I personally obtained the chief complaint(s) and history of present illness.  I confirmed and edited as necessary the review of systems, past medical/surgical history, family history, social history, and examination findings as documented by others; and I examined the patient myself. I personally reviewed the relevant tests, images, and reports as documented above. I formulated and edited as necessary the assessment and plan and discussed the findings and management plan with the patient and family.    Clay Moore MD, PhD

## 2022-03-30 NOTE — NURSING NOTE
Chief Complaints and History of Present Illnesses   Patient presents with     Consult For     Chief Complaint(s) and History of Present Illness(es)     Consult For     Laterality: right eye    Associated symptoms: floaters.  Negative for flashes, eye pain and headache              Comments     Pt here for consult of PPV due to Posterior vitreous detachment of right eye. Pt notes a large floater right eye blocking her line of vision. No other concerns.  Pt not using drops.  TRUPTI LANIER 8:00 AM March 30, 2022

## 2022-04-09 ENCOUNTER — HEALTH MAINTENANCE LETTER (OUTPATIENT)
Age: 52
End: 2022-04-09

## 2022-05-12 ENCOUNTER — OFFICE VISIT (OUTPATIENT)
Dept: FAMILY MEDICINE | Facility: CLINIC | Age: 52
End: 2022-05-12
Payer: MEDICARE

## 2022-05-12 VITALS
OXYGEN SATURATION: 96 % | DIASTOLIC BLOOD PRESSURE: 86 MMHG | HEART RATE: 94 BPM | TEMPERATURE: 98.3 F | SYSTOLIC BLOOD PRESSURE: 139 MMHG | RESPIRATION RATE: 16 BRPM | BODY MASS INDEX: 34.22 KG/M2 | WEIGHT: 212 LBS

## 2022-05-12 DIAGNOSIS — L40.9 PSORIASIS: Primary | ICD-10-CM

## 2022-05-12 DIAGNOSIS — Z23 HIGH PRIORITY FOR 2019-NCOV VACCINE: ICD-10-CM

## 2022-05-12 PROCEDURE — 99214 OFFICE O/P EST MOD 30 MIN: CPT | Performed by: FAMILY MEDICINE

## 2022-05-12 RX ORDER — BETAMETHASONE DIPROPIONATE 0.05 %
GEL (GRAM) TOPICAL 2 TIMES DAILY
Qty: 100 G | Refills: 1 | Status: SHIPPED | OUTPATIENT
Start: 2022-05-12

## 2022-05-12 NOTE — Clinical Note
Wilber  Did you administer the vaccination? If so please complete if not let me know and I will delete it. Lenin

## 2022-05-12 NOTE — PROGRESS NOTES
[facility] Progress Note          Assessment & Plan     High priority for 2019-nCoV vaccine  Patient states she is a candidate for the booster  - COVID-19,PF,PFIZER (12+ Yrs GRAY LABEL)    Psoriasis  Referred patient to dermatology and provided her with a high potency steroid gel to be used twice a day until clear cautioned her not to use this on other areas of her body.  - augmented betamethasone dipropionate (DIPROLENE) 0.05 % external gel; Apply topically 2 times daily  - Adult Dermatology Referral; Future  Health maintenance advised patient to come in for a wellness visit at which time we will readdress problems of mammogram Pap smear and other health maintenance issues.    I spent a total of 35 minutes on the day of the visit.   Time spent doing chart review, history and exam, documentation and further activities per the note           No follow-ups on file.    Jae Rivera MD  [facility] DUANE Irving is a 52 year old who presents for the following health issues   Patient presents with:  Psoriasis: Patient reports wanting to have topical cream refilled today for Psoriasis.No other concerns  Imm/Inj: COVID-19 VACCINE        HPI   Patient's original reason reason for visit was for yes though this has been resolved by going to ophthalmology and she has a floater though she has no uveitis.  Psoriasis diagnosis 2019  Patient reports being diagnosed with psoriasis in 2019 at that point she just had a small plaque with scale on her anterior elbow and was given a small amount of Kenalog cream which she has not used.  She now notes that her psoriasis has spread to the surfaces of both her forearms and on her legs and some on her on her trunk as well that with her previous health system she was not able to get into dermatology and she is interested in doing this as well as getting some local treatment currently.  We discussed biologics and patient is interesting in pursuing  "this.        Review of Systems         Objective    /86   Pulse 94   Temp 98.3  F (36.8  C) (Oral)   Resp 16   Wt 96.2 kg (212 lb)   SpO2 96%   BMI 34.22 kg/m    Body mass index is 34.22 kg/m .  Physical Exam  Constitutional:       Appearance: She is well-developed.   HENT:      Head: Normocephalic.   Eyes:      Conjunctiva/sclera: Conjunctivae normal.   Pulmonary:      Effort: Pulmonary effort is normal. No respiratory distress.   Skin:     General: Skin is warm.      Findings: Rash present.             Comments: Rash description:     Location: arm, lower and lower leg     Distribution: generalized     Lesion grouping: clustered     Lesion type: Plaques no scale on the plaques though patient reports that she is recently \"defoaliated\"     Color: Berger   Psychiatric:         Behavior: Behavior normal.                        "

## 2022-05-12 NOTE — PATIENT INSTRUCTIONS
Here is the plan from today's visit    1. Psoriasis  Use this twice a day on your plaques on your arms and legs as we talked about do not use this on sensitive skin as it may cause skin thinning   - augmented betamethasone dipropionate (DIPROLENE) 0.05 % external gel; Apply topically 2 times daily  Dispense: 100 g; Refill: 1  - Adult Dermatology Referral; Future    2. High priority for 2019-nCoV vaccine    - COVID-19,PF,PFIZER (12+ Yrs GRAY LABEL)            Please call or return to clinic if your symptoms don't go away.    Follow up plan  Return in about 4 weeks (around 6/9/2022) for CPE/Wellness Visit.     Thank you for coming to [facility] today.  Lab Testing:  **If you had lab testing today and your results are reassuring or normal they will be mailed to you or sent through [employer] within 7 days. Please call us at [phone] if you do not receive your results within 2 weeks.   **If the lab tests need quick action we will call you with the results. The phone number we will call with results is # [phone] (home) . If this is not the best number please call our clinic and change the number.  Medication Refills:  If you need any refills please call your pharmacy and they will contact us.   If you need to  your refill at a new pharmacy, please contact the new pharmacy directly. The new pharmacy will help you get your medications transferred faster.   Scheduling:  If you have any concerns about today's visit or wish to schedule another appointment please call our office during normal business hours [phone] (8-5:00 M-F)   Referrals: If you do not get a call from central scheduling, please refer to directions on your visit summary or call our office during normal business hours for assistance.    Mammogram Scheduling [phone]   XRay/CT/Ultrasound/MRI Scheduling [phone]  [facility] has ultrasound appointments available on Wednesdays. If you would like your ultrasound at [facility]  clinic, please call [phone] to schedule.   Medical Concerns:  If you have urgent medical concerns please call [phone] at any time of the day.    Jae Rivera MD

## 2022-05-20 DIAGNOSIS — H35.61 RETINAL HEMORRHAGE OF RIGHT EYE: Primary | ICD-10-CM

## 2022-05-20 DIAGNOSIS — H43.391 VITREOUS SYNERESIS OF RIGHT EYE: Primary | ICD-10-CM

## 2022-05-20 DIAGNOSIS — H53.10 SUBJECTIVE VISION DISTURBANCE: ICD-10-CM

## 2022-07-19 ENCOUNTER — VIRTUAL VISIT (OUTPATIENT)
Dept: ENDOCRINOLOGY | Facility: CLINIC | Age: 52
End: 2022-07-19
Payer: MEDICARE

## 2022-07-19 VITALS — BODY MASS INDEX: 34.23 KG/M2 | WEIGHT: 213 LBS | HEIGHT: 66 IN

## 2022-07-19 DIAGNOSIS — R73.03 PREDIABETES: ICD-10-CM

## 2022-07-19 DIAGNOSIS — E89.0 POSTABLATIVE HYPOTHYROIDISM: ICD-10-CM

## 2022-07-19 DIAGNOSIS — E66.811 CLASS 1 OBESITY WITHOUT SERIOUS COMORBIDITY WITH BODY MASS INDEX (BMI) OF 34.0 TO 34.9 IN ADULT, UNSPECIFIED OBESITY TYPE: ICD-10-CM

## 2022-07-19 DIAGNOSIS — Z98.84 S/P GASTRIC BYPASS: ICD-10-CM

## 2022-07-19 DIAGNOSIS — Z71.3 NUTRITIONAL COUNSELING: Primary | ICD-10-CM

## 2022-07-19 DIAGNOSIS — E89.2 POSTABLATIVE HYPOPARATHYROIDISM (H): Primary | ICD-10-CM

## 2022-07-19 PROCEDURE — 99204 OFFICE O/P NEW MOD 45 MIN: CPT | Mod: 95 | Performed by: INTERNAL MEDICINE

## 2022-07-19 PROCEDURE — 97802 MEDICAL NUTRITION INDIV IN: CPT | Mod: GZ | Performed by: DIETITIAN, REGISTERED

## 2022-07-19 RX ORDER — TEMAZEPAM 30 MG
CAPSULE ORAL
COMMUNITY
Start: 2022-01-05 | End: 2022-09-29

## 2022-07-19 RX ORDER — LEVOTHYROXINE SODIUM 200 UG/1
1 TABLET ORAL DAILY
COMMUNITY
Start: 2021-11-08 | End: 2022-07-19

## 2022-07-19 RX ORDER — MULTIVITAMIN,THER AND MINERALS
1 TABLET ORAL DAILY
COMMUNITY
Start: 2021-07-13 | End: 2022-09-30

## 2022-07-19 RX ORDER — LEVONORGESTREL 52 MG/1
1 INTRAUTERINE DEVICE INTRAUTERINE
COMMUNITY

## 2022-07-19 RX ORDER — ESCITALOPRAM OXALATE 20 MG/1
1 TABLET ORAL DAILY
COMMUNITY
Start: 2020-11-19 | End: 2022-07-19

## 2022-07-19 RX ORDER — QUETIAPINE FUMARATE 100 MG/1
100 TABLET, FILM COATED ORAL AT BEDTIME
COMMUNITY
Start: 2022-07-02

## 2022-07-19 NOTE — PROGRESS NOTES
Virtual Visit Check-In    During this virtual visit the patient is located in [place], patient verifies this as the location during the entirety of this visit.     Nora Irving is a 52 year old who is being evaluated via a billable video visit.      How would you like to obtain your AVS? MyChart  If the video visit is dropped, the invitation should be resent by: Text to cell phone:  [phone]  Will anyone else be joining your video visit? No        Video-Visit Details    Type of service:  Video Visit    Originating Location (pt. Location): Home    Distant Location (provider location):  [facility] [place]     Platform used for Video Visit: Randall Kimbrough NREMT

## 2022-07-19 NOTE — PATIENT INSTRUCTIONS
Nutrition Goals  1) Check in on meal replacement plan - aim to have more structure as opposed to craving.     2) Continue staying hydrated    *Protein Shake Criteria: no more than 210 Calories, at least 20 grams of protein, and less than 10 grams of sugar     Meal Replacement Shake Options:   [facility] VLC smoothie )   Premier Protein  Slim Fast Advanced Nutrition   Muscle Milk  Integrated Supplements, no artificial sugars   Genepro, unflavored protein powder    Meal Replacement Bar Options:  Quest Protein Bars   Built Bar  One Protein Bar   David Signature Protein Bar ([employer])   Pure Protein Bars     Frozen Meal Ideas:  Healthy Choice  Lean Cuisine  Atkins Meals  Smart Ones    [facility] E-store:  You may purchase meal replacement products online at our e-store. Visit e-store at [email or web address]  - The one week starter kits is a great way to sample a variety of products.  - For recipes and ideas on how to use products, visit - Stand Offer    Supplement needs post op  Multivitamin with iron  Vitamin D: at least 3,000 IUs/day total   Calcium citrate  9998-1159 mg/day  B12 500 mcg/day (may be enough in your multivitamin - if not we can prescribe a monthly injection or you can take a daily Sublingual form)    Estimated energy needs at rest:   ~1920 kcal/day for weight maintenance   ~ 1420 kcal/day for weight loss    Follow-Up:  1 month     BONI Patton, RD, LD  Clinic #: [phone]

## 2022-07-19 NOTE — NURSING NOTE
Chief Complaint   Patient presents with     Consult     New consultation for weight management.         Vitals:    07/19/22 1244   Weight: 213 lb       Body mass index is 34.38 kg/m .      Alejandro Kimbrough, EMT  Surgery Clinic

## 2022-07-19 NOTE — PATIENT INSTRUCTIONS
- fasting lab tomorrow and will decide on medication    If you have any questions, please do not hesitate to call Weight management clinic at [phone] or [phone].  If you need to fax, please fax to [phone].    Sincerely,    Rufina Peters MD  Endocrinology
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
## 2022-08-05 ENCOUNTER — LAB (OUTPATIENT)
Dept: LAB | Facility: CLINIC | Age: 52
End: 2022-08-05
Payer: MEDICARE

## 2022-08-05 DIAGNOSIS — Z71.3 NUTRITIONAL COUNSELING: ICD-10-CM

## 2022-08-05 DIAGNOSIS — Z98.84 S/P GASTRIC BYPASS: ICD-10-CM

## 2022-08-05 DIAGNOSIS — R73.03 PREDIABETES: ICD-10-CM

## 2022-08-05 DIAGNOSIS — E66.811 CLASS 1 OBESITY WITHOUT SERIOUS COMORBIDITY WITH BODY MASS INDEX (BMI) OF 34.0 TO 34.9 IN ADULT, UNSPECIFIED OBESITY TYPE: ICD-10-CM

## 2022-08-05 DIAGNOSIS — E89.0 POSTABLATIVE HYPOTHYROIDISM: ICD-10-CM

## 2022-08-05 LAB
ERYTHROCYTE [DISTWIDTH] IN BLOOD BY AUTOMATED COUNT: 12.4 % (ref 10–15)
HBA1C MFR BLD: 5.5 % (ref 0–5.6)
HCT VFR BLD AUTO: 42.2 % (ref 35–47)
HGB BLD-MCNC: 14.1 G/DL (ref 11.7–15.7)
MCH RBC QN AUTO: 32.6 PG (ref 26.5–33)
MCHC RBC AUTO-ENTMCNC: 33.4 G/DL (ref 31.5–36.5)
MCV RBC AUTO: 98 FL (ref 78–100)
PLATELET # BLD AUTO: 259 10E3/UL (ref 150–450)
PTH-INTACT SERPL-MCNC: 87 PG/ML (ref 15–65)
RBC # BLD AUTO: 4.32 10E6/UL (ref 3.8–5.2)
VIT B12 SERPL-MCNC: 348 PG/ML (ref 232–1245)
WBC # BLD AUTO: 5.5 10E3/UL (ref 4–11)

## 2022-08-05 PROCEDURE — 82607 VITAMIN B-12: CPT

## 2022-08-05 PROCEDURE — 80061 LIPID PANEL: CPT

## 2022-08-05 PROCEDURE — 99000 SPECIMEN HANDLING OFFICE-LAB: CPT

## 2022-08-05 PROCEDURE — 36415 COLL VENOUS BLD VENIPUNCTURE: CPT

## 2022-08-05 PROCEDURE — 80053 COMPREHEN METABOLIC PANEL: CPT

## 2022-08-05 PROCEDURE — 84590 ASSAY OF VITAMIN A: CPT | Mod: 90

## 2022-08-05 PROCEDURE — 82728 ASSAY OF FERRITIN: CPT | Mod: GA

## 2022-08-05 PROCEDURE — 84443 ASSAY THYROID STIM HORMONE: CPT

## 2022-08-05 PROCEDURE — 84439 ASSAY OF FREE THYROXINE: CPT

## 2022-08-05 PROCEDURE — 82306 VITAMIN D 25 HYDROXY: CPT

## 2022-08-05 PROCEDURE — 85027 COMPLETE CBC AUTOMATED: CPT

## 2022-08-05 PROCEDURE — 83970 ASSAY OF PARATHORMONE: CPT

## 2022-08-05 PROCEDURE — 83036 HEMOGLOBIN GLYCOSYLATED A1C: CPT

## 2022-08-06 LAB
ALBUMIN SERPL-MCNC: 4.1 G/DL (ref 3.4–5)
ALP SERPL-CCNC: 58 U/L (ref 40–150)
ALT SERPL W P-5'-P-CCNC: 35 U/L (ref 0–50)
ANION GAP SERPL CALCULATED.3IONS-SCNC: 11 MMOL/L (ref 3–14)
AST SERPL W P-5'-P-CCNC: 21 U/L (ref 0–45)
BILIRUB SERPL-MCNC: 0.5 MG/DL (ref 0.2–1.3)
BUN SERPL-MCNC: 16 MG/DL (ref 7–30)
CALCIUM SERPL-MCNC: 8.7 MG/DL (ref 8.5–10.1)
CHLORIDE BLD-SCNC: 111 MMOL/L (ref 94–109)
CHOLEST SERPL-MCNC: 192 MG/DL
CO2 SERPL-SCNC: 20 MMOL/L (ref 20–32)
CREAT SERPL-MCNC: 0.87 MG/DL (ref 0.52–1.04)
DEPRECATED CALCIDIOL+CALCIFEROL SERPL-MC: 24 UG/L (ref 20–75)
FASTING STATUS PATIENT QL REPORTED: YES
FERRITIN SERPL-MCNC: 44 NG/ML (ref 8–252)
GFR SERPL CREATININE-BSD FRML MDRD: 80 ML/MIN/1.73M2
GLUCOSE BLD-MCNC: 93 MG/DL (ref 70–99)
HDLC SERPL-MCNC: 61 MG/DL
LDLC SERPL CALC-MCNC: 115 MG/DL
NONHDLC SERPL-MCNC: 131 MG/DL
POTASSIUM BLD-SCNC: 4.3 MMOL/L (ref 3.4–5.3)
PROT SERPL-MCNC: 7.2 G/DL (ref 6.8–8.8)
SODIUM SERPL-SCNC: 142 MMOL/L (ref 133–144)
T4 FREE SERPL-MCNC: 0.58 NG/DL (ref 0.76–1.46)
TRIGL SERPL-MCNC: 82 MG/DL
TSH SERPL DL<=0.005 MIU/L-ACNC: >100 MU/L (ref 0.4–4)

## 2022-08-08 DIAGNOSIS — Z98.84 S/P GASTRIC BYPASS: ICD-10-CM

## 2022-08-08 DIAGNOSIS — E66.811 CLASS 1 OBESITY WITHOUT SERIOUS COMORBIDITY WITH BODY MASS INDEX (BMI) OF 34.0 TO 34.9 IN ADULT, UNSPECIFIED OBESITY TYPE: Primary | ICD-10-CM

## 2022-08-08 LAB
ANNOTATION COMMENT IMP: NORMAL
RETINYL PALMITATE SERPL-MCNC: <0.02 MG/L
VIT A SERPL-MCNC: 0.45 MG/L

## 2022-08-09 ENCOUNTER — OFFICE VISIT (OUTPATIENT)
Dept: OPHTHALMOLOGY | Facility: CLINIC | Age: 52
End: 2022-08-09
Attending: OPHTHALMOLOGY
Payer: MEDICARE

## 2022-08-09 DIAGNOSIS — H53.10 SUBJECTIVE VISION DISTURBANCE: ICD-10-CM

## 2022-08-09 DIAGNOSIS — Z85.850 HISTORY OF THYROID CANCER: ICD-10-CM

## 2022-08-09 DIAGNOSIS — H43.391 FLOATERS IN VISUAL FIELD, RIGHT: Primary | ICD-10-CM

## 2022-08-09 DIAGNOSIS — H35.61 RETINAL HEMORRHAGE OF RIGHT EYE: ICD-10-CM

## 2022-08-09 PROCEDURE — G0463 HOSPITAL OUTPT CLINIC VISIT: HCPCS | Mod: 25

## 2022-08-09 PROCEDURE — 92250 FUNDUS PHOTOGRAPHY W/I&R: CPT | Performed by: OPHTHALMOLOGY

## 2022-08-09 PROCEDURE — 92083 EXTENDED VISUAL FIELD XM: CPT | Performed by: OPHTHALMOLOGY

## 2022-08-09 PROCEDURE — 92134 CPTRZ OPH DX IMG PST SGM RTA: CPT | Performed by: OPHTHALMOLOGY

## 2022-08-09 PROCEDURE — 92014 COMPRE OPH EXAM EST PT 1/>: CPT | Performed by: OPHTHALMOLOGY

## 2022-08-09 ASSESSMENT — TONOMETRY
OS_IOP_MMHG: 23
IOP_METHOD: TONOPEN
OD_IOP_MMHG: 22

## 2022-08-09 ASSESSMENT — REFRACTION_WEARINGRX
OD_CYLINDER: +0.75
OS_CYLINDER: +0.25
OS_SPHERE: -7.25
OD_AXIS: 153
OD_SPHERE: -7.00
OS_AXIS: 012

## 2022-08-09 ASSESSMENT — VISUAL ACUITY
CORRECTION_TYPE: GLASSES
OS_CC+: -1
OD_CC+: -
METHOD: SNELLEN - LINEAR
OS_CC: 20/20
OD_CC: 20/25

## 2022-08-09 ASSESSMENT — CONF VISUAL FIELD
OD_NORMAL: 1
METHOD: COUNTING FINGERS
OS_NORMAL: 1

## 2022-08-09 NOTE — PROGRESS NOTES
CC -   floaters right eye     INTERVAL HISTORY - floaters are less prominent     PMH -   PattiMaria Fernanda Zamorano is a  52 year old year-old patient with history of high myopia and thyroid cancer (thyroid papillary carcinoma) on levothyroxine (diagnosed in 2011; had thyroidectomy; had recurrence in right neck lymph nodes; received radioactive Iodine and has been tumor free since 2017)    PAST OCULAR SURGERY  No history of eyes surgery  No laser     RETINAL IMAGING:  OCT MAC 3/30/2022 and 8/9/2022  OD - slightly hazy view, normal contour, PHF attached   OS - normal contour, PHF attached     Optos: 3/30/2022 and 8/9/2022  RE - slight blur overlying inferior macula and nerve head, no holes or tears  LE - temporal hyperpigmented spot, otherwise nl fundus    ASSESSMENT & PLAN    # Vitreous syneresis and opacities right eye   - visually significant but becoming less noticeable    - large fluffy opacities; possibly less dense compared to previous   - she doesn't have PVD so vitreous opacities are not what we normally see in patients with PVD; DDx include primary intraocular lymphoma (not likely, no cell in vit today, symptoms and signs are improving) vs amyloidosis infiltration vs vitreous base avulsion   - discussed observation is the best option now that symptoms are improving    # Atrophic holes right eye    - Retina detachment precautions were discussed with the patient (presence or increased in flashes, floaters or a curtain in the visual field) and was asked to return if any of the those occur    # Retinal hemorrhage right eye    - single microaneurysm noted in superior periphery; resolved 8/9/2022    # Hx of thyroid cancer   - with metastasis to neck lymph nodes; did not receive external radiotherapy; treated in 2017 and tumor free since       return to clinic: 10-12 months for DFE and OCT and optos ou and VF TOP each eye      Complete documentation of historical and exam elements from today's encounter can be found in the  full encounter summary report (not reduplicated in this progress note). I personally obtained the chief complaint(s) and history of present illness.  I confirmed and edited as necessary the review of systems, past medical/surgical history, family history, social history, and examination findings as documented by others; and I examined the patient myself. I personally reviewed the relevant tests, images, and reports as documented above. I formulated and edited as necessary the assessment and plan and discussed the findings and management plan with the patient and family.    Clay Moore MD, PhD

## 2022-08-09 NOTE — NURSING NOTE
Chief Complaints and History of Present Illnesses   Patient presents with     Follow Up     Chief Complaint(s) and History of Present Illness(es)     Follow Up     Laterality: right eye    Frequency: constantly    Timing: throughout the day    Course: stable    Associated symptoms: Negative for eye pain, redness, flashes and floaters    Pain scale: 0/10              Comments       Vitreous syneresis and opacities right eye  Pt would like to discuss if she needs a biopsy or PPV.  Is there any correspondence from Dr. Barajas, per pt? Located at [facility].  Thinks floaters 'are a little better'.  Pt states eyes comfortable.

## 2022-08-10 ASSESSMENT — SLIT LAMP EXAM - LIDS
COMMENTS: NORMAL
COMMENTS: NORMAL

## 2022-08-10 ASSESSMENT — CUP TO DISC RATIO
OD_RATIO: 0.3
OS_RATIO: 0.3

## 2022-08-10 ASSESSMENT — EXTERNAL EXAM - LEFT EYE: OS_EXAM: NORMAL

## 2022-08-10 ASSESSMENT — EXTERNAL EXAM - RIGHT EYE: OD_EXAM: NORMAL

## 2022-08-12 ENCOUNTER — TELEPHONE (OUTPATIENT)
Dept: ENDOCRINOLOGY | Facility: CLINIC | Age: 52
End: 2022-08-12

## 2022-08-12 DIAGNOSIS — E66.811 CLASS 1 OBESITY WITHOUT SERIOUS COMORBIDITY WITH BODY MASS INDEX (BMI) OF 34.0 TO 34.9 IN ADULT, UNSPECIFIED OBESITY TYPE: Primary | ICD-10-CM

## 2022-08-12 DIAGNOSIS — Z98.84 S/P GASTRIC BYPASS: ICD-10-CM

## 2022-08-12 DIAGNOSIS — R73.03 PREDIABETES: ICD-10-CM

## 2022-08-12 NOTE — TELEPHONE ENCOUNTER
"Prior Authorization Retail Medication Request    Medication/Dose: Saxenda  ICD code (if different than what is on RX):    Class 1 obesity without serious comorbidity with body mass index (BMI) of 34.0 to 34.9 in adult, unspecified obesity type [E66.9, Z68.34]  - Primary       S/P gastric bypass [Z98.84]       Prediabetes [R73.03]           Previously Tried and Failed:  Weight loss surgery, history of diet and exercise  Rationale:  I had the pleasure of seeing your patient, Nora Zamorano. Full intake/assessment was done to determine barriers to weight loss success and develop a treatment plan. Nora Zamorano is a 52 year old female interested in treatment of medical problems associated with excess weight. She has a height of 5' 6\", a weight of 213 lbs 0 oz, and the calculated Body mass index is 34.38 kg/m .     She has the following co-morbidities: psoriasis, papillary thyroid carcinoma s/p thyroidectomy, recurrent of thyroid cancer at the lymph node s/p WELSH treatment, retinal hemorrhage, PTSD  RNY Gastric bypass 2013     She has hx of RNY gastric bypass surgery in 2013. She only lost weight with liquid diet prior to bypass surgery. Preoperative weight was 244 lbs. She lost weight down to 166 lbs after surgery only if she fasted.      Ht 1.676 m (5' 6\")   Wt 96.6 kg (213 lb)   BMI 34.38 kg/m      Insurance Name:    Insurance ID:        Pharmacy Information (if different than what is on RX)  Name:  [facility] -- [place], [place] - [address]  Phone:  [phone]  "

## 2022-08-12 NOTE — TELEPHONE ENCOUNTER
Central Prior Authorization Team   Phone: [phone]      PA Initiation    Medication: liraglutide - Weight Management (SAXENDA) 18 MG/3ML pen - INITIATED  Insurance Company: [employer] - Phone [phone] Fax [phone]  Pharmacy Filling the Rx: [facility] -- [place], [place] - [address]  Filling Pharmacy Phone: [phone]  Filling Pharmacy Fax:    Start Date: 8/12/2022

## 2022-08-12 NOTE — TELEPHONE ENCOUNTER
Ran test claim for Saxenda. Needs PA    Please submit PA and let liaison know when Approved / Denied    Harshad Part D  BIN: [id number]  ID: [id number]  GROUP: [id number]  PCN: [id number]

## 2022-08-16 NOTE — TELEPHONE ENCOUNTER
PRIOR AUTHORIZATION DENIED    Medication: liraglutide - Weight Management (SAXENDA) 18 MG/3ML pen - DENIED    Denial Date: 8/13/2022    Denial Rational: WEIGHT LOSS DRUGS ARE NOT COVERED UNDER PART D      Appeal Information: IF PROVIDER WOULD LIKE TO APPEAL THIS DECISION PLEASE PROVIDE PA TEAM WITH LETTER OF MEDICAL NECESSITY

## 2022-08-17 NOTE — TELEPHONE ENCOUNTER
Medication Appeal Request    Please initiate an appeal for the requested medication: liraglutide - Weight Management (SAXENDA) 18 MG/3ML pen - DENIED    Has a letter of medical necessity been completed in EPIC?   yes    Any additional lab values/information to include?     Would you like to include any research articles?               If yes please include the hyperlink(s) below or fax to    [phone] for Specialty/Retail               [phone] for Infusion/Clinic Administered.                Include the patients name and MRN on the fax cover sheet.

## 2022-08-18 NOTE — TELEPHONE ENCOUNTER
Medication Appeal Initiation    We have initiated an appeal for the requested medication:  Medication: liraglutide - Weight Management (SAXENDA) 18 MG/3ML pen - Appeal Pending  Appeal Start Date:     Insurance Company:    Comments:

## 2022-08-25 NOTE — TELEPHONE ENCOUNTER
MEDICATION APPEAL DENIED    Medication: liraglutide - Weight Management (SAXENDA) 18 MG/3ML pen - Appeal Pending    Denial Date: 8/24/2022    Denial Rational: Medicare Part D doesn't cover Weight Loss medication    Second Level Appeal Information: fax [phone] ph [phone]    Second level appeals will be managed by the clinic staff and provider. Please contact the [facility] Prior Authorization Team if additional information about the denial is needed.    Letter must come directly from provider why this is medically necessary for pt

## 2022-08-29 DIAGNOSIS — E66.811 CLASS 1 OBESITY WITHOUT SERIOUS COMORBIDITY WITH BODY MASS INDEX (BMI) OF 34.0 TO 34.9 IN ADULT, UNSPECIFIED OBESITY TYPE: Primary | ICD-10-CM

## 2022-08-29 DIAGNOSIS — Z98.84 S/P GASTRIC BYPASS: ICD-10-CM

## 2022-08-29 RX ORDER — TOPIRAMATE 25 MG/1
TABLET, FILM COATED ORAL
Qty: 90 TABLET | Refills: 2 | Status: SHIPPED | OUTPATIENT
Start: 2022-08-29 | End: 2023-01-03

## 2022-09-29 ENCOUNTER — VIRTUAL VISIT (OUTPATIENT)
Dept: PHARMACY | Facility: CLINIC | Age: 52
End: 2022-09-29

## 2022-09-29 DIAGNOSIS — Z78.9 TAKES DIETARY SUPPLEMENTS: ICD-10-CM

## 2022-09-29 DIAGNOSIS — E66.09 OBESITY DUE TO EXCESS CALORIES, UNSPECIFIED CLASSIFICATION, UNSPECIFIED WHETHER SERIOUS COMORBIDITY PRESENT: Primary | ICD-10-CM

## 2022-09-29 DIAGNOSIS — F33.1 MODERATE EPISODE OF RECURRENT MAJOR DEPRESSIVE DISORDER (H): ICD-10-CM

## 2022-09-29 DIAGNOSIS — F41.1 GENERALIZED ANXIETY DISORDER: ICD-10-CM

## 2022-09-29 DIAGNOSIS — R21 RASH: ICD-10-CM

## 2022-09-29 DIAGNOSIS — Z98.84 HISTORY OF ROUX-EN-Y GASTRIC BYPASS: ICD-10-CM

## 2022-09-29 DIAGNOSIS — E89.0 POSTOPERATIVE HYPOTHYROIDISM: ICD-10-CM

## 2022-09-29 PROCEDURE — 99607 MTMS BY PHARM ADDL 15 MIN: CPT | Performed by: PHARMACIST

## 2022-09-29 PROCEDURE — 99605 MTMS BY PHARM NP 15 MIN: CPT | Performed by: PHARMACIST

## 2022-09-29 NOTE — PROGRESS NOTES
Medication Therapy Management (MTM) Encounter    ASSESSMENT:                            Medication Adherence/Access: No issues identified    Obesity: Would benefit from further titrating topiramate as prescribed.     History Candida-en-Y Gastric Bypass: Would benefit from multivitamin start, patient declined today. Vitamin D not at goal of at least 30, PTH slightly elevated, would benefit from starting vitamin D supplementation of 2000 international unit(s) daily. ASMBS Guidelines recommends at least 1328-1853 mg calcium per day - patient appears to be getting ~650-1300 mg per day at least via diet, therefore can hold off on calcium recommendations for now.     Depression/Anxiety:  Stable from subjective perspective.     Hypothyroidism: TSH not within normal limits/at goal. Repeat at follow up with Endocrinology as planned.     Supplement: Stable.     Rash: Stable.     PLAN:                            1. Start Vitamin D 2000 international unit(s) daily     2. Increase Topiramate to 2 tablets (50 mg) nightly for 1 week, then if tolerating increase to 3 tablets (75 mg) nightly thereafter. Stay at this dose until follow up with the provider.     Follow-up: 6 weeks with Dr. Rufina Peters and 3 months with MTM pharmacist     SUBJECTIVE/OBJECTIVE:                          Nora Zamorano is a 52 year old female called for an initial visit. She was referred to me from Dr. Rufina Peters.      Reason for visit: phentermine/topiramate start follow up.    Allergies/ADRs: Reviewed in chart  Past Medical History: Reviewed in chart  Tobacco: She reports that she has never smoked. She has never used smokeless tobacco.  Alcohol: not currently using    Medication Adherence/Access:   Patient uses pill box(es), only for pills during the day as those are the ones she says she is more likely to forget. The sleep medicines are at bedside and not within pill box.   Patient takes medications 2 time(s) per day.   Per patient,  "misses medication 0 times per week.     Obesity:   Phentermine 8 mg once daily in AM   Topiramate 25 mg daily in PM     Followed by Dr. Rufina Peters, seen 7/19/2022 for [facility] Weight [facility]. Had tried getting Saxenda covered but with medicare Patient is experiencing the follow side effects: None. She reports she has lost 9 lb since starting regimen. She is noticing that she is feeling colmenares more quickly and less appetite. She reports she was a \"grazer\", feeling that she ate almost constantly. When taking this regimen she has less of a drive to eat and noticing that when she has a que of boredom or anxiety that she will go more for a low calorie drink than food. Has been working with dietitian, Shi Gupta. She has been using meal replacements to replace 1 meal per day. She finds that she is eating 1-2 portions consistent meals and 1-2 meal replacements per day.      Wt Readings from Last 4 Encounters:   07/19/22 213 lb (96.6 kg)   05/12/22 212 lb (96.2 kg)   12/07/19 198 lb (89.8 kg)   11/26/19 205 lb 9.6 oz (93.3 kg)     Estimated body mass index is 34.38 kg/m  as calculated from the following:    Height as of 7/19/22: 5' 6\" (1.676 m).    Weight as of 7/19/22: 213 lb (96.6 kg).    History Candida-en-Y Gastric Bypass:   No supplements     Patient had gastric bypass completed in 2013. Patient describes bowel movements as regaular. does not have complaints of acid reflux.  She does not have issues of swallowing food/pills. Calcium in diet: premier protein shakes 1-2 per day (650 mg calcium per shake) - recently started.  Has not been taking vitamins due to previous financial insecurities and could not afford supplements out of pocket if insurance would not cover.  She reports she did stat new job so now willing to pay for some supplementation if necessary.     Hemoglobin   Date Value Ref Range Status   08/05/2022 14.1 11.7 - 15.7 g/dL Final   10/01/2020 11.7 11.7 - 15.7 g/dL Final     Ferritin   Date " Value Ref Range Status   08/05/2022 44 8 - 252 ng/mL Final     Lab Results   Component Value Date    VITDT 24 08/05/2022      Lab Results   Component Value Date    PTHI 87 (H) 08/05/2022      Lab Results   Component Value Date    B12 348 08/05/2022      Lab Results   Component Value Date    JOSE GUADALUPE 0.45 08/05/2022     Depression/Anxiety:    Escitalopram 20 mg daily   Quetiapine 100-200 mg nightly bedtime, currently 100 mg nightly   Temazepam 15 mg capsule: 2 capsules nightly   Clonidine 0.1 mg three times daily as needed, has only needed recently once weekly     She has found that since starting topiramate, that she is feeling more stable and feels similar to when she has been on a mood stabilizer. She has had a lot of greater upheavals in her life of new job and life stressors and finding that she has been more easily able to manage those feelings. No greater anxiety since starting phentermine. Having more structured time throughout the day has helped with improved schedule and has help to be more consistent with her meals and finding this helpful. Finds that she sleeps very well with the current above regimen.   Specialty: Bette Robbins, Psychiatry Provider; [facility] [place]   PHQ 1/26/2022 2/2/2022 2/2/2022   PHQ-9 Total Score 7 8 8   Q9: Thoughts of better off dead/self-harm past 2 weeks Not at all Not at all Not at all   Some encounter information is confidential and restricted. Go to Review Flowsheets activity to see all data.       JEVON-7 SCORE 1/17/2022 1/17/2022 1/17/2022   Total Score - - 13 (moderate anxiety)   Total Score 13 13 13   Some encounter information is confidential and restricted. Go to Review Flowsheets activity to see all data.     Hypothyroidism:   Levothyroxine 200 mcg daily.     Patient is having the following symptom: fatigued; heart palpations until more recently. Reports was not getting in levothyroxine consistently when lab was last taken, describes that adherence has improved since that  lab and plans to repeat in another 4 weeks whenfollows up with Endocrinologist.   History of thyroid cancer.   TSH   Date Value Ref Range Status   08/05/2022 >100.00 (H) 0.40 - 4.00 mU/L Final   02/28/2014 <0.02 (L) 0.4 - 5.0 mU/L Final     Supplement:   Multivitamin daily     No concerns.     Rash:   Augmented betamethasone 0.05% gel as needed     Reports taking for psoriasis and effective when needed. Typically will have on back of forearms and elbows, and helpful.   ----------------      I spent 45 minutes with this patient today. All changes were made via collaborative practice agreement with Dr. Rufina Peters. A copy of the visit note was provided to the patient's provider(s).    The patient was sent via [employer] a summary of these recommendations.     Lauren Bloch, PharmD, BCACP   Medication Therapy Management Pharmacist   [facility]      Telemedicine Visit Details  Type of service:  Telephone visit  Start Time: 10:00 AM  End Time: 10:45 AM  Originating Location (patient location): Home  Distant Location (provider location):  [facility]     Medication Therapy Recommendations  History of Candida-en-Y gastric bypass    Rationale: Preventive therapy - Needs additional medication therapy - Indication   Recommendation: Start Medication - BARIATRIC MULTIVITAMINS/IRON PO   Status: Declined per Patient          Rationale: Untreated condition - Needs additional medication therapy - Indication   Recommendation: Start Medication - vitamin D3 50 mcg (2000 units) tablet   Status: Accepted - no CPA Needed         Obesity    Current Medication: topiramate (TOPAMAX) 25 MG tablet   Rationale: Does not understand instructions - Adherence - Adherence   Recommendation: Provide Adherence Intervention   Status: Patient Agreed - Adherence/Education

## 2022-09-30 PROBLEM — Z98.84 HISTORY OF ROUX-EN-Y GASTRIC BYPASS: Status: ACTIVE | Noted: 2022-09-30

## 2022-10-01 NOTE — PATIENT INSTRUCTIONS
"Recommendations from today's MTM visit:                                                    MTM (medication therapy management) is a service provided by a clinical pharmacist designed to help you get the most of out of your medicines.   Today we reviewed what your medicines are for, how to know if they are working, that your medicines are safe and how to make your medicine regimen as easy as possible.      1. Start Vitamin D 2000 international unit(s) daily     2. Increase Topiramate to 2 tablets (50 mg) nightly for 1 week, then if tolerating increase to 3 tablets (75 mg) nightly thereafter. Stay at this dose until follow up with the provider.     Follow-up: 6 weeks with Dr. Rufina Peters and 3 months with MTM pharmacist     It was great speaking with you today.  I value your experience and would be very thankful for your time in providing feedback in our clinic survey. In the next few days, you may receive an email or text message from [facility] [employer] with a link to a survey related to your  clinical pharmacist.\"     My Clinical Pharmacist's contact information:                                                      Please feel free to contact me with any questions or concerns you have.      Lauren Bloch, PharmD  Medication Therapy Management Pharmacist   [facility]             "

## 2022-10-09 ENCOUNTER — HEALTH MAINTENANCE LETTER (OUTPATIENT)
Age: 52
End: 2022-10-09

## 2022-10-25 DIAGNOSIS — E66.811 CLASS 1 OBESITY WITHOUT SERIOUS COMORBIDITY WITH BODY MASS INDEX (BMI) OF 34.0 TO 34.9 IN ADULT, UNSPECIFIED OBESITY TYPE: ICD-10-CM

## 2022-10-25 DIAGNOSIS — Z98.84 S/P GASTRIC BYPASS: ICD-10-CM

## 2022-10-27 NOTE — TELEPHONE ENCOUNTER
Patient saw MTM pharmacist Lauren Bloch in September. Future visit with Dr. Almaraz scheduled in November. Routed to provider for sign off.

## 2022-10-27 NOTE — TELEPHONE ENCOUNTER
Lomaira Oral Tablet 8 MG  Last Written Prescription Date:   8/29/2022  Last Fill Quantity: 30,   # refills: 1  Last Office Visit :  7/19/2022  Future Office visit:   11/29/2022    Routing refill request to provider for review/approval because:  Drug not on the [facility], [facility] or [facility] refill protocol or controlled substance      Lindy Castillo RN  Central Triage Red Flags/Med Refills

## 2022-12-04 ENCOUNTER — MYC REFILL (OUTPATIENT)
Dept: ENDOCRINOLOGY | Facility: CLINIC | Age: 52
End: 2022-12-04

## 2022-12-04 DIAGNOSIS — Z98.84 S/P GASTRIC BYPASS: ICD-10-CM

## 2022-12-04 DIAGNOSIS — E66.811 CLASS 1 OBESITY WITHOUT SERIOUS COMORBIDITY WITH BODY MASS INDEX (BMI) OF 34.0 TO 34.9 IN ADULT, UNSPECIFIED OBESITY TYPE: ICD-10-CM

## 2023-01-03 ENCOUNTER — VIRTUAL VISIT (OUTPATIENT)
Dept: PHARMACY | Facility: CLINIC | Age: 53
End: 2023-01-03
Payer: MEDICAID

## 2023-01-03 DIAGNOSIS — L40.9 PSORIASIS: ICD-10-CM

## 2023-01-03 DIAGNOSIS — F33.1 MODERATE EPISODE OF RECURRENT MAJOR DEPRESSIVE DISORDER (H): ICD-10-CM

## 2023-01-03 DIAGNOSIS — F41.1 GENERALIZED ANXIETY DISORDER: ICD-10-CM

## 2023-01-03 DIAGNOSIS — E66.811 CLASS 1 OBESITY WITHOUT SERIOUS COMORBIDITY WITH BODY MASS INDEX (BMI) OF 34.0 TO 34.9 IN ADULT, UNSPECIFIED OBESITY TYPE: Primary | ICD-10-CM

## 2023-01-03 DIAGNOSIS — E89.0 POSTOPERATIVE HYPOTHYROIDISM: ICD-10-CM

## 2023-01-03 DIAGNOSIS — Z98.84 S/P GASTRIC BYPASS: ICD-10-CM

## 2023-01-03 PROCEDURE — 99207 PR NO CHARGE LOS: CPT | Performed by: PHARMACIST

## 2023-01-03 RX ORDER — TOPIRAMATE 25 MG/1
TABLET, FILM COATED ORAL
Qty: 90 TABLET | Refills: 2 | Status: SHIPPED | OUTPATIENT
Start: 2023-01-03 | End: 2023-02-07 | Stop reason: DRUGHIGH

## 2023-01-03 NOTE — Clinical Note
Dominic Almaraz, I had the opportunity to meet with Nora Irving today. She's stopped phentermine due to palpitations and increased anxiety after stopping clonidine with psychiatry. We restarted topiramate today as it has been helpful for cravings.  She has new job lined up for Feb and we discussed GLP1 may be covered under new insurance. She has significant history of Pap. Thyroid Cancer. After reviewing literature, I can't find strong correlation between PTC and MTC, but I did find 1 paper which found a small number of PTC pathology found GLP1 receptors. I;m not sure this is clinically significant, but wanted to discuss further with you. Paper: https://www.ncbi.nlm.nih.gov/pmc/articles/KSB5035106/  Patient will be seeing you in February. Of note, due for TSH (follows with [facility]) and bariatric vitamins.  Maria Fernanda

## 2023-01-03 NOTE — PROGRESS NOTES
Medication Therapy Management (MTM) Encounter    ASSESSMENT:                            Medication Adherence/Access: New insurance in Feb 2023 may cover GLP1 therapy.    Obesity: Would benefit from restarting topiramate as has been helpful with food cravings as in the past .Patient to restart topiramate titration. Patient doing well working toward weight loss goal, down 14 lbs.  Educated not needed to take with phentermine. Do not recommend restarting phentermine due to palpitations and anxiety.    May consider GLP-1's as new insurance in February.  As patient has personal history of papillary thyroid carcinoma will discuss with care team whether risk outweighs benefit of use due to known contraindication for GLP-1's with medullary thyroid carcinoma.  As pharmacist understands, PTC and MTC are nonrelated thyroid conditions with different clinical findings, however some research to indicate some GLP-1 receptor expression in PTC cells.  Will discuss further with endocrinologist.    History Candida-en-Y Gastric Bypass: Defer to future discussion.    Depression/Anxiety:  Stable.    Hypothyroidism: TSH not within normal limits/at goal.  Defer to endocrinology.    Psoriasis: Stable.        PLAN:                            1) restart topiramate.    2) follow-up with endocrinology to repeat thyroid levels.  Send to weight management clinic if updated since August 2022.    3) goal for vitamins post Candida-en-Y gastric bypass:    -Take the following after a Candida-en-y Gastric Bypass:    Multivitamin/minerals: adult dose 2 times daily    Iron: 45-60 mg elemental daily (18-36 mg daily if low risk) - may partly or fully be covered in multivitamin     Calcium Citrate containing vitamin D: 500 mg 3 times daily or 600 mg 2 times daily    Vitamin B12: sublingual form of at least 500 mcg daily or injection of 1000 mcg monthly    Follow-up: Dr. Rufina Peters 2/7/23.  As needed with pharmacist after updated insurance with new job in  2023.    Nora Irving's insurance no longer covers MTM visits and out of pocket costs for future MTM visits were discussed. Nora Irving's plans to follow up with Dr. Rufina Peters.  After new job in February 2023, MTM pharmacy may be covered again.      SUBJECTIVE/OBJECTIVE:                          Nora Zamorano is a 52 year old female contacted via secure video for a follow-up visit.  Today's visit is a follow-up MTM visit from 9/29/22.     Reason for visit: Medication Therapy Management - Weight Management.    Allergies/ADRs: Reviewed in chart  Past Medical History: Reviewed in chart  Tobacco: She reports that she has never smoked. She has never used smokeless tobacco.  Alcohol: not currently using    Medication Adherence/Access:   Patient uses pill box(es), only for pills during the day as those are the ones she says she is more likely to forget. The sleep medicines are at bedside and not within pill box.   Patient takes medications 2 time(s) per day.   Per patient, misses medication 0 times per week.     Obesity:         Followed by Dr. Rufina Peters, seen 7/19/2022 for [facility]. Had tried getting Saxenda covered but not covered with medicare. Patient stopped phentermine and topiramate as she had reduced clonidine with psychiatry and noticed tachycardia and palpitations with phentermine.  Patient tolerated topiramate well, but believes that she could not take this without phentermine.  Has been off of phentermine and topiramate for about 3 weeks.    Patient working on mindful eating.  She notices that as she has been reducing quetiapine and now starting a new job she anticipates continued success in decreasing portion sizes and healthier options.  She prefers to eat small meals throughout the day.  Likes Premier protein shakes.  Did find somewhat better management of cravings when she was on previous regimen.     Patient has history of papillary Thyroid Carcinoma -wonders if this  "will preclude her from trial of GLP-1's.  She would like to explore this as an option when she has new insurance through [employer] starting in February.    Current Brett: 199.3 lb (90.6 kg)   Overall weight loss - 14lbs (6.6%)  Wt Readings from Last 4 Encounters:   07/19/22 213 lb (96.6 kg)   05/12/22 212 lb (96.2 kg)   12/07/19 198 lb (89.8 kg)   11/26/19 205 lb 9.6 oz (93.3 kg)     Estimated body mass index is 34.38 kg/m  as calculated from the following:    Height as of 7/19/22: 5' 6\" (1.676 m).    Weight as of 7/19/22: 213 lb (96.6 kg).      History Candida-en-Y Gastric Bypass:   Vitamin B12 1000 units daily    Patient had gastric bypass completed in 2013. Did not have time to discuss significantly today.  Patient states no changes since last visit with Pharm.D.  Did not have time to discuss whether that meant she had started vitamin D or multivitamin (see Pharm.D. note from 9/29/2022).    Hemoglobin   Date Value Ref Range Status   08/05/2022 14.1 11.7 - 15.7 g/dL Final   10/01/2020 11.7 11.7 - 15.7 g/dL Final     Ferritin   Date Value Ref Range Status   08/05/2022 44 8 - 252 ng/mL Final     Lab Results   Component Value Date    VITDT 24 08/05/2022      Lab Results   Component Value Date    PTHI 87 (H) 08/05/2022      Lab Results   Component Value Date    B12 348 08/05/2022      Lab Results   Component Value Date    JOSE GUADALUPE 0.45 08/05/2022     Depression/Anxiety:    Escitalopram 20 mg daily   Quetiapine 100-200 mg nightly bedtime, currently 100 mg nightly   Temazepam 15 mg capsule: 1 daily    She found topiramate helped to stabilize mood somewhat.  Working to minimize sedatives and meds that increased weight.  Currently feeling very stable on these medications.  Believes phentermine may have been increasing anxiety slightly and this has improved with stopping as well after stopping due to palpitations relating to cessation of clonidine.  Finds that she sleeps very well with the current above regimen which is " restorative and helpful for overall wellbeing. Specialty: Bette Robbins, Psychiatry Provider; [facility] in [place]     Hypothyroidism:   Levothyroxine 200 mcg daily.     Did not have time to discuss further today.  Due for follow-up with endocrinology, follows out [place] system so values may be more up-to-date.   History of thyroid cancer.   TSH   Date Value Ref Range Status   08/05/2022 >100.00 (H) 0.40 - 4.00 mU/L Final   02/28/2014 <0.02 (L) 0.4 - 5.0 mU/L Final       Psoriasis:  Augmented betamethasone 0.05% gel as needed     Reports taking for psoriasis and effective when needed. Typically will have on back of forearms and elbows, and helpful.  Uses 2 times a week or less.        Today's Vitals: There were no vitals taken for this visit. -Virtual visit  ----------------      I spent 28 minutes with this patient today. All changes were made via collaborative practice agreement with Rufina Peters. A copy of the visit note was provided to the patient's provider(s).    A summary of these recommendations was sent via [employer].    Maria Fernanda Hopper, AzebD, MPH  Medication Therapy Management Pharmacist   [facility] (through 1/20/23)        Telemedicine Visit Details  Type of service:  Video Conference via [employer]  Start Time: 10:32 AM  End Time: 11:00 AM     Medication Therapy Recommendations  Obesity    Current Medication: topiramate (TOPAMAX) 25 MG tablet   Rationale: Untreated condition - Needs additional medication therapy - Indication   Recommendation: Start Medication - restart topiramate for craving management   Status: Accepted per CPA

## 2023-01-03 NOTE — Clinical Note
"Hi Dr. Rivera!  It sounds like Nora Irving hasn't followed with you recently per her report. But I wanted to take the opportunity to share this chart and say \"hi\" from a former Smil[employer]!   She follows with our Weight Management team and I had the opportunity to meet with her today as an FYI to you.  Hope this finds you well! All the best in 2023! Maria Fernanda Hopper, PharmD, MPH Medication Therapy Management Pharmacist  [facility] (through 1/20/23)  "

## 2023-01-04 NOTE — PATIENT INSTRUCTIONS
"Recommendations from today's MTM visit:                                                    MTM (medication therapy management) is a service provided by a clinical pharmacist designed to help you get the most of out of your medicines.      1) Start Topiramate 25 mg tablet: 1 tablet (25 mg) by mouth nightly for 1 week, then if tolerating increase to 2 tablets (50 mg) nightly for 1 week, then if tolerating increase to 3 tablets (75 mg) nightly thereafter. Stay at this dose until follow up with the provider.       2) follow-up with endocrinology to repeat thyroid levels.  Send to weight management clinic if updated since August 2022.    3) goal for vitamins post Candida-en-Y gastric bypass:    -Take the following after a Candida-en-y Gastric Bypass:    Multivitamin/minerals: adult dose 2 times daily    Iron: 45-60 mg elemental daily (18-36 mg daily if low risk) - may partly or fully be covered in multivitamin     Calcium Citrate containing vitamin D: 500 mg 3 times daily or 600 mg 2 times daily    Vitamin B12: sublingual form of at least 500 mcg daily or injection of 1000 mcg monthly    Follow-up: Dr. Rufina Peters 2/7/23.  As needed with pharmacist after updated insurance with new job in 2023. Call [phone] to schedule.     It was great speaking with you today.  I value your experience and would be very thankful for your time in providing feedback in our clinic survey. In the next few days, you may receive an email or text message from [email or web address] with a link to a survey related to your  clinical pharmacist.\"     My Clinical Pharmacist's contact information:                                                      Please feel free to contact me with any questions or concerns you have.      Maria Fernanda Hopper, PharmD, MPH  Medication Therapy Management Pharmacist   [facility] (through 1/20/23)    Lauren Bloch, PharmD  Medication Therapy Management Pharmacist   [facility]      Topiramate (Topamax) is a medication that is used most often to treat migraine headaches or for seizures. It has also been found to help with weight loss. Although it's not currently FDA approved for weight loss, it has been used safely for a number of years to help people who are carrying extra weight.      Just how topiramate helps with weight loss has not been exactly determined. However it seems to work on areas of the brain to quiet down signals related to eating.       Topiramate may make you:                >feel less interest in eating in between meals               >think less about food and eating               >find it easier to push the plate away               >find giving up pop easier                          >have an easier time eating less     For some of our patients, the pills work right away. They feel and think quite differently about food. Other patients don't feel much of a change but find in fact they have lost weight! Like all weight loss medications, topiramate works best when you help it work.  This means:             1) Have less tempting high calorie (fattening) food around the house or office                2) Have lower calorie food (fruits, vegetables,low fat meats and dairy) for snacks                 3) Eat out only one time or less each week.               4) Eat your meals at a table with the TV or computer off.     Side-effects. Topiramate is generally well tolerated. The main side-effects we see are:               Tingling in hands,feet, or face (usually not very troublesome)               Mental confusion and word finding trouble (about 10% of patients have this.)                        Feeling sleepy or a bit dopey- this goes away very soon after starting.     One of the dangers of topiramate is the possibility of birth defects--if you get pregnant when you are on it, there is the risk that your baby will be born with a cleft lip or palate.  If  you are on topiramate and of child bearing age, you need to be on a reliable form of birth control or refrain from sexual intercourse.      Please refer to the pharmacy insert for more information on side-effects. Since many pharmacists are not familiar with the use of topiramate in weight loss, calling the clinic will get you the most accurate information on the use of this medication for weight loss.                In order to get refills of this or any medication we prescribe you must be seen in the medical weight mgmt clinic every 2-3 months. Please have your pharmacy fax a refill request.     Topiramate and Alcohol Use:     In general, when we are focusing on leading a healthier lifestyle with your nutrition, try to refrain from consistent daily alcohol use as this contains empty calories with no nutritional value. An interaction between topiramate and alcohol exists: alcohol may enhance the brain depressant effect of topiramate and alcohol may also increase the concentration of topiramate within the body. This means you may become more drowsy/tired when taking in combination and be at risk for further cognitive issues. If you are going to drink alcohol, you should do so sparingly and limit to 1 alcoholic beverage when you do drink, this is especially true for the first time that you drink alcohol on this medication so you can safely see how you feel on the medication.

## 2023-01-18 DIAGNOSIS — H35.61 RETINAL HEMORRHAGE OF RIGHT EYE: ICD-10-CM

## 2023-01-18 DIAGNOSIS — H53.10 SUBJECTIVE VISION DISTURBANCE: Primary | ICD-10-CM

## 2023-02-07 ENCOUNTER — VIRTUAL VISIT (OUTPATIENT)
Dept: ENDOCRINOLOGY | Facility: CLINIC | Age: 53
End: 2023-02-07
Payer: MEDICARE

## 2023-02-07 VITALS — WEIGHT: 203 LBS | BODY MASS INDEX: 32.62 KG/M2 | HEIGHT: 66 IN

## 2023-02-07 DIAGNOSIS — E66.811 CLASS 1 OBESITY WITHOUT SERIOUS COMORBIDITY WITH BODY MASS INDEX (BMI) OF 34.0 TO 34.9 IN ADULT, UNSPECIFIED OBESITY TYPE: Primary | ICD-10-CM

## 2023-02-07 DIAGNOSIS — Z98.84 S/P GASTRIC BYPASS: ICD-10-CM

## 2023-02-07 PROCEDURE — 99214 OFFICE O/P EST MOD 30 MIN: CPT | Mod: 95 | Performed by: INTERNAL MEDICINE

## 2023-02-07 RX ORDER — TOPIRAMATE 50 MG/1
50 TABLET, FILM COATED ORAL 2 TIMES DAILY
Qty: 180 TABLET | Refills: 1 | Status: SHIPPED | OUTPATIENT
Start: 2023-02-07
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
## 2023-02-07 NOTE — PATIENT INSTRUCTIONS
PLAN:   - restart phentermine 4 mg in the morning  - increase topiramate to 50 mg in the AM and 50 mg PM  - continue to work on diet and add more exercise    FOLLOW-UP:    See Lauren Bloch, PharmD in 2 month(s)  See Dr.Tasma TAYLOR in 4 month(s)    If you have any questions, please do not hesitate to call Weight management clinic at [phone] or [phone].  If you need to fax, please fax to [phone].    Sincerely,    Rufina Peters MD  Endocrinology

## 2023-02-07 NOTE — PROGRESS NOTES
Virtual Visit Check-In    During this virtual visit the patient is located in [place], patient verifies this as the location during the entirety of this visit.     Nora Irving is a 52 year old who is being evaluated via a billable video visit.      How would you like to obtain your AVS? MyChart  If the video visit is dropped, the invitation should be resent by: Text to cell phone: [phone]  Will anyone else be joining your video visit? No        Video-Visit Details    Originating Location (pt. Location): Home    Distant Location (provider location):  Off-site  Platform used for Video Visit: Randall Kimbrough NREMT
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
## 2023-02-07 NOTE — NURSING NOTE
Chief Complaint   Patient presents with     Follow Up     Return weight management.         Vitals:    02/07/23 1438   Weight: 203 lb       Body mass index is 32.77 kg/m .      Alejandro Kimbrough, EMT  [facility]                         ineffective breastfeeding/knowledge deficit knowledge deficit knowledge deficit

## 2023-02-18 ENCOUNTER — HEALTH MAINTENANCE LETTER (OUTPATIENT)
Age: 53
End: 2023-02-18

## 2023-05-21 ENCOUNTER — HEALTH MAINTENANCE LETTER (OUTPATIENT)
Age: 53
End: 2023-05-21

## 2023-09-26 ENCOUNTER — TELEPHONE (OUTPATIENT)
Dept: OPHTHALMOLOGY | Facility: CLINIC | Age: 53
End: 2023-09-26
Payer: MEDICARE

## 2023-09-26 NOTE — TELEPHONE ENCOUNTER
Appointment Request From: Nora Zamorano     With Provider: Clay Sung MD [[facility]]     Preferred Date Range: Any     Preferred Times: Any Time     Reason for visit: Request an Appointment     Comments:  High Myopia eye exam and re-check floater       --    Above per MyChart appt request received by triage today    Pt last seen in August 2022 for floaters    Note to patient communicator to assist in reaching out/scheduling first available with Dr. Moore in lieu of any new floaters/flashing/concerns    -- may direct back to triage if having new symptoms    Rafa Ruano RN 3:45 PM 09/26/23

## 2023-11-21 ENCOUNTER — OFFICE VISIT (OUTPATIENT)
Dept: OPHTHALMOLOGY | Facility: CLINIC | Age: 53
End: 2023-11-21
Attending: OPHTHALMOLOGY
Payer: MEDICARE

## 2023-11-21 DIAGNOSIS — H52.13 MYOPIA OF BOTH EYES: ICD-10-CM

## 2023-11-21 DIAGNOSIS — H53.10 SUBJECTIVE VISION DISTURBANCE: ICD-10-CM

## 2023-11-21 DIAGNOSIS — H35.61 RETINAL HEMORRHAGE OF RIGHT EYE: Primary | ICD-10-CM

## 2023-11-21 DIAGNOSIS — H25.13 NUCLEAR SENILE CATARACT OF BOTH EYES: ICD-10-CM

## 2023-11-21 PROCEDURE — G0463 HOSPITAL OUTPT CLINIC VISIT: HCPCS | Performed by: OPHTHALMOLOGY

## 2023-11-21 PROCEDURE — 92134 CPTRZ OPH DX IMG PST SGM RTA: CPT | Performed by: OPHTHALMOLOGY

## 2023-11-21 PROCEDURE — 92015 DETERMINE REFRACTIVE STATE: CPT | Mod: GY

## 2023-11-21 PROCEDURE — 99214 OFFICE O/P EST MOD 30 MIN: CPT | Performed by: OPHTHALMOLOGY

## 2023-11-21 PROCEDURE — 92250 FUNDUS PHOTOGRAPHY W/I&R: CPT | Performed by: OPHTHALMOLOGY

## 2023-11-21 PROCEDURE — 99207 FUNDUS PHOTOS OU (BOTH EYES): CPT | Mod: 26 | Performed by: OPHTHALMOLOGY

## 2023-11-21 PROCEDURE — 92083 EXTENDED VISUAL FIELD XM: CPT | Performed by: OPHTHALMOLOGY

## 2023-11-21 RX ORDER — METHOTREXATE 25 MG/ML
INJECTION, SOLUTION INTRA-ARTERIAL; INTRAMUSCULAR; INTRAVENOUS
COMMUNITY

## 2023-11-21 ASSESSMENT — SLIT LAMP EXAM - LIDS
COMMENTS: NORMAL
COMMENTS: NORMAL

## 2023-11-21 ASSESSMENT — REFRACTION_MANIFEST
OD_AXIS: 155
OD_SPHERE: -8.00
OS_CYLINDER: +0.50
OS_SPHERE: -8.25
OS_ADD: +2.50
OD_ADD: +2.50
OS_AXIS: 025
OD_CYLINDER: +1.00

## 2023-11-21 ASSESSMENT — TONOMETRY
IOP_METHOD: TONOPEN
OD_IOP_MMHG: 19
OS_IOP_MMHG: 20

## 2023-11-21 ASSESSMENT — EXTERNAL EXAM - LEFT EYE: OS_EXAM: NORMAL

## 2023-11-21 ASSESSMENT — REFRACTION_WEARINGRX
OD_CYLINDER: +0.75
OD_AXIS: 159
OS_SPHERE: -7.25
OS_CYLINDER: +0.25
OD_SPHERE: -7.00
SPECS_TYPE: SVL
OS_AXIS: 012

## 2023-11-21 ASSESSMENT — CUP TO DISC RATIO
OS_RATIO: 0.3
OD_RATIO: 0.3

## 2023-11-21 ASSESSMENT — EXTERNAL EXAM - RIGHT EYE: OD_EXAM: NORMAL

## 2023-11-21 ASSESSMENT — VISUAL ACUITY
OS_CC: 20/30
METHOD: SNELLEN - LINEAR
OS_PH_CC+: -1
OD_CC: 20/30
OS_PH_CC: 20/20
CORRECTION_TYPE: GLASSES

## 2023-11-21 NOTE — NURSING NOTE
Chief Complaints and History of Present Illnesses   Patient presents with    Follow Up     1 year follow up Vitreous syneresis and opacities right eye     Chief Complaint(s) and History of Present Illness(es)       Follow Up              Comments: 1 year follow up Vitreous syneresis and opacities right eye              Comments    Pt still seeing large floater in RE. Pt feels vision is worse in both eyes. No flashes of light.  No eye pain today. No new redness or dryness.    AILEEN Cameron November 21, 2023 3:50 PM

## 2023-11-21 NOTE — PROGRESS NOTES
CC -   floaters right eye     INTERVAL HISTORY - floaters are less prominent     PMH -   PattiMaria Fernanda Zamorano is a  53 year old year-old patient with history of high myopia and thyroid cancer (thyroid papillary carcinoma) on levothyroxine (diagnosed in 2011; had thyroidectomy; had recurrence in right neck lymph nodes; received radioactive Iodine and has been tumor free since 2017)    Family hx of wet macular degeneration father    PAST OCULAR SURGERY  No history of eyes surgery  No laser     RETINAL IMAGING:  OCT MAC 3/30/2022 and 8/9/2022 and 11/21/23  OD - slightly hazy view, normal contour, PHF attached; stable   OS - normal contour, PHF attached; stable    Optos: 3/30/2022 and 8/9/2022 and 11/21/23  RE - slight blur overlying inferior macula and nerve head, no holes or tears; has not changed compared to 2022  LE - temporal hyperpigmented spot, otherwise nl fundus    ASSESSMENT & PLAN    # Vitreous syneresis and opacities right eye   - visually significant but becoming less noticeable    - large fluffy opacities; possibly less dense compared to previous   - she doesn't have PVD so vitreous opacities are not what we normally see in patients with PVD; DDx include primary intraocular lymphoma (not likely, no cell in vit today, symptoms and signs are improving; densities did not change over time) vs amyloidosis infiltration (not likely, did not change over time) vs vitreous base avulsion   - discussed observation is the best option now that symptoms are improving    # Atrophic holes right eye    - Retina detachment precautions were discussed with the patient (presence or increased in flashes, floaters or a curtain in the visual field) and was asked to return if any of the those occur    # Retinal hemorrhage right eye    - single microaneurysm noted in superior periphery; resolved 8/9/2022    # Hx of thyroid cancer   - with metastasis to neck lymph nodes; did not receive external radiotherapy; treated in 2017 and tumor  free since       return to clinic: 12 months for DFE and OCT and optos ou and VF TOP each eye      Complete documentation of historical and exam elements from today's encounter can be found in the full encounter summary report (not reduplicated in this progress note). I personally obtained the chief complaint(s) and history of present illness.  I confirmed and edited as necessary the review of systems, past medical/surgical history, family history, social history, and examination findings as documented by others; and I examined the patient myself. I personally reviewed the relevant tests, images, and reports as documented above. I formulated and edited as necessary the assessment and plan and discussed the findings and management plan with the patient and family.    Clay Moore MD, PhD

## 2024-03-16 ENCOUNTER — HEALTH MAINTENANCE LETTER (OUTPATIENT)
Age: 54
End: 2024-03-16

## 2024-11-14 DIAGNOSIS — H53.10 SUBJECTIVE VISION DISTURBANCE: Primary | ICD-10-CM

## 2025-01-25 ENCOUNTER — HEALTH MAINTENANCE LETTER (OUTPATIENT)
Age: 55
End: 2025-01-25

## 2025-03-22 ENCOUNTER — HEALTH MAINTENANCE LETTER (OUTPATIENT)
Age: 55
End: 2025-03-22

## 2025-03-27 NOTE — GROUP NOTE
Omeprazole was sent 3/26/25   Psychoeducation Group Note    PATIENT'S NAME: Nora Zamorano  MRN:   [id number]  :   1970  ACCT. NUMBER: [id number]  DATE OF SERVICE: 21  START TIME:  2:00 PM  END TIME:  2:50 PM  FACILITATOR: Taras Devi OTR/L  TOPIC: MH Life Skills Group: Resiliency Development                                    Service Modality:  Video Visit     Telemedicine Visit: The patient's condition can be safely assessed and treated via synchronous audio and visual telemedicine encounter.      Reason for Telemedicine Visit: Services only offered telehealth    Originating Site (Patient Location): Patient's home    Distant Site (Provider Location): Provider Remote Setting- Home Office    Consent:  The patient/guardian has verbally consented to: the potential risks and benefits of telemedicine (video visit) versus in person care; bill my insurance or make self-payment for services provided; and responsibility for payment of non-covered services.     Mode of Communication:  Video Conference via Medical Zoom    As the provider I attest to compliance with applicable laws and regulations related to telemedicine.       [facility] Day Treatment  TRACK: 1B    NUMBER OF PARTICIPANTS: 4    Summary of Group / Topics Discussed:  Resiliency Development:  Coping Skills(Depression- Why is it different than normal sadness): Patients were taught how to identify stressors, signs of stress, coping skills, and prevention strategies for overall stress management.  Patients were given the opportunity to identify both ongoing and acute mental health symptoms and how to effectively manage these symptoms by developing an effective aftercare plan.  Patients increased awareness of community based resources.    Patient Session Goals / Objectives:    Identified how using coping skills can be used for symptom and stress management       Improved awareness of individualed symptoms and stressors and how to effectively cope      Established a relapse prevention plan to practice these skills in their own environments    Practiced and reflected on how to generalize taught skills to their everyday life        Patient Participation / Response:  Fully participated with the group by sharing personal reflections / insights and openly received / provided feedback with other participants.    Demonstrated understanding of content through video/group discussion , Verbalized understanding of content and Patient would benefit from additional opportunities to practice the content to be able to generalize it to their everyday life with increased intentionality, consistency, and efficacy in support of their psychiatric recovery    Treatment Plan:  Patient has a current master individualized treatment plan.  See Epic treatment plan for more information.    Taras Devi, OTR/L

## 2025-05-24 ENCOUNTER — HEALTH MAINTENANCE LETTER (OUTPATIENT)
Age: 55
End: 2025-05-24

## (undated) DEVICE — SPONGE BALL KERLIX ROUND XL W/O STRING LATEX 4935

## (undated) DEVICE — LINEN HALF SHEET 5512

## (undated) DEVICE — PACK MINOR LITHOTOMY RIDGES

## (undated) DEVICE — SOL NACL 0.9% IRRIG 1000ML BOTTLE 07138-09

## (undated) DEVICE — TUBING SUCTION 12"X1/4" N612

## (undated) DEVICE — SOL NACL 0.9% INJ 1000ML BAG 2B1324X

## (undated) DEVICE — TUBING IV EXTENSION SET ANESTHESIA 34" MLL 2C6227

## (undated) DEVICE — LINEN FULL SHEET 5511

## (undated) DEVICE — DRAPE VAGI BAG 18X9" 1072

## (undated) DEVICE — PAD PERI INDIV WRAP 11" 2022A

## (undated) DEVICE — ENDO SEAL SCOPE SELF SEAL 1.2MM 26153EAU

## (undated) DEVICE — BAG CLEAR TRASH 1.3M 39X33" P4040C

## (undated) DEVICE — PAD CHUX UNDERPAD 30X36" P3036C

## (undated) DEVICE — GLOVE ESTEEM POWDER FREE SMT 6.0  2D72PT60

## (undated) DEVICE — TUBING IRRIG CYSTO/BLADDER SET 81" LF 2C4040

## (undated) DEVICE — CATH INTERMITTENT CLEAN-CATH FEMALE 14FR 6" VINYL LF 420614

## (undated) RX ORDER — METHYLERGONOVINE MALEATE 0.2 MG/ML
INJECTION INTRAVENOUS
Status: DISPENSED
Start: 2019-11-26

## (undated) RX ORDER — GLYCOPYRROLATE 0.2 MG/ML
INJECTION INTRAMUSCULAR; INTRAVENOUS
Status: DISPENSED
Start: 2019-11-26

## (undated) RX ORDER — LIDOCAINE HYDROCHLORIDE 10 MG/ML
INJECTION, SOLUTION EPIDURAL; INFILTRATION; INTRACAUDAL; PERINEURAL
Status: DISPENSED
Start: 2019-11-26

## (undated) RX ORDER — PROPOFOL 10 MG/ML
INJECTION, EMULSION INTRAVENOUS
Status: DISPENSED
Start: 2019-11-26

## (undated) RX ORDER — FENTANYL CITRATE 50 UG/ML
INJECTION, SOLUTION INTRAMUSCULAR; INTRAVENOUS
Status: DISPENSED
Start: 2019-11-26

## (undated) RX ORDER — DEXAMETHASONE SODIUM PHOSPHATE 4 MG/ML
INJECTION, SOLUTION INTRA-ARTICULAR; INTRALESIONAL; INTRAMUSCULAR; INTRAVENOUS; SOFT TISSUE
Status: DISPENSED
Start: 2019-11-26

## (undated) RX ORDER — OXYCODONE HYDROCHLORIDE 5 MG/1
TABLET ORAL
Status: DISPENSED
Start: 2019-11-26

## (undated) RX ORDER — ONDANSETRON 2 MG/ML
INJECTION INTRAMUSCULAR; INTRAVENOUS
Status: DISPENSED
Start: 2019-11-26